# Patient Record
Sex: MALE | Race: BLACK OR AFRICAN AMERICAN | NOT HISPANIC OR LATINO | Employment: OTHER | RURAL
[De-identification: names, ages, dates, MRNs, and addresses within clinical notes are randomized per-mention and may not be internally consistent; named-entity substitution may affect disease eponyms.]

---

## 2017-03-01 ENCOUNTER — OFFICE VISIT (OUTPATIENT)
Dept: FAMILY MEDICINE CLINIC | Facility: CLINIC | Age: 67
End: 2017-03-01

## 2017-03-01 VITALS
BODY MASS INDEX: 30.19 KG/M2 | HEART RATE: 72 BPM | WEIGHT: 227.8 LBS | DIASTOLIC BLOOD PRESSURE: 89 MMHG | OXYGEN SATURATION: 98 % | SYSTOLIC BLOOD PRESSURE: 160 MMHG | TEMPERATURE: 97.8 F | HEIGHT: 73 IN

## 2017-03-01 DIAGNOSIS — I10 ESSENTIAL HYPERTENSION: ICD-10-CM

## 2017-03-01 DIAGNOSIS — E78.5 HYPERLIPIDEMIA, UNSPECIFIED HYPERLIPIDEMIA TYPE: Primary | ICD-10-CM

## 2017-03-01 DIAGNOSIS — F32.A DEPRESSION, UNSPECIFIED DEPRESSION TYPE: ICD-10-CM

## 2017-03-01 DIAGNOSIS — D50.9 IRON DEFICIENCY ANEMIA, UNSPECIFIED IRON DEFICIENCY ANEMIA TYPE: ICD-10-CM

## 2017-03-01 PROCEDURE — 99213 OFFICE O/P EST LOW 20 MIN: CPT | Performed by: FAMILY MEDICINE

## 2017-03-01 RX ORDER — POTASSIUM CHLORIDE 750 MG/1
CAPSULE, EXTENDED RELEASE ORAL
COMMUNITY
Start: 2016-11-30 | End: 2017-09-18 | Stop reason: DRUGHIGH

## 2017-03-01 RX ORDER — HYDROCHLOROTHIAZIDE 25 MG/1
25 TABLET ORAL DAILY
Qty: 30 TABLET | Refills: 3 | Status: SHIPPED | OUTPATIENT
Start: 2017-03-01 | End: 2017-07-24

## 2017-03-01 RX ORDER — POTASSIUM CHLORIDE 20 MEQ/1
TABLET, EXTENDED RELEASE ORAL
COMMUNITY
Start: 2016-12-05 | End: 2017-09-25 | Stop reason: DRUGHIGH

## 2017-03-01 RX ORDER — CITALOPRAM 20 MG/1
40 TABLET ORAL DAILY
Qty: 31 TABLET | Refills: 11 | Status: SHIPPED | OUTPATIENT
Start: 2017-03-01 | End: 2018-05-07

## 2017-03-01 NOTE — PATIENT INSTRUCTIONS
- take 40 mg of celexa once a day  - take micardis -hydrochlorothizide 80-25 mg by mouth daily  - will add new medication hydrochlorothiazide 25 mg by mouth daily.    - check blood pressure at home daily Goal <140/90  - get labwork  - recheck in 1 month

## 2017-03-01 NOTE — PROGRESS NOTES
Subjective   German Ray is a 66 y.o. male.     Problem List  1. Hypertension  2. Hyperlipidemia  3. Depression  4. Hypokalemia  5. Bereavement  6. Iron deficiency anemia  7. Vitamin D deficiency  8. H/O prostate cancer sp radiation therapy and seed therapy - sees Oncologist every 6 months.  9. Diverticulosis  10. Lower GI bleeding   11. Radiation proctitis  12. Hemorrhoids  13. Lipoma of upper back  14. BPH    Patient is 67 yo AAM with the above medical issues. Is here for followup and recheck.  Has seen Dr. Guerrero (General Surgery) for lipoma on back and pt chose not to have surgery at this time.  Currently lipoma on upper back is not causing any discomfort.  Pt states he has felt a little down lately. Wife passed away months ago and some friends passed away as well. Currently on celexa 20 mg PO q daily but pt took 40 mg at one time and back to 20.  Has not been consistently taking medication.  Also has iron deficiency anemia and is currently on iron supplement. Last hgb was 11.0.  Is due for new recheck today.  Blood pressure is on higher side today and does not check or record at home.  Today >140/90.  Currently on micardis-HCT 80-25 mg PO q daily and Ditilzem 260 mg PO q daily.   Also on pravastatin for hyperlipidemia. Is due for recheck on lipid panel.        Anemia   Presents for follow-up visit. There has been no abdominal pain, anorexia, bruising/bleeding easily, confusion, fever, leg swelling, light-headedness, malaise/fatigue, pallor, palpitations, paresthesias or pica. Signs of blood loss that are not present include hematemesis, hematochezia, melena, menorrhagia and vaginal bleeding. There is no history of chronic renal disease or heart failure. There are no compliance problems.  Compliance with medications is %.   Hypertension   This is a chronic problem. The current episode started more than 1 year ago. The problem is unchanged. The problem is uncontrolled. Pertinent negatives include no  anxiety, blurred vision, chest pain, headaches, malaise/fatigue, neck pain, orthopnea, palpitations, peripheral edema, PND, shortness of breath or sweats. There are no associated agents to hypertension. Risk factors for coronary artery disease include male gender, sedentary lifestyle and stress. Past treatments include calcium channel blockers, angiotensin blockers and diuretics. The current treatment provides no improvement. There are no compliance problems.  There is no history of angina, kidney disease, CAD/MI, CVA, heart failure, left ventricular hypertrophy, PVD or renovascular disease. There is no history of chronic renal disease, coarctation of the aorta, hyperaldosteronism, hypercortisolism, hyperparathyroidism, a hypertension causing med or pheochromocytoma.      The following portions of the patient's history were reviewed and updated as appropriate: allergies, current medications, past family history, past medical history, past social history, past surgical history and problem list.    Review of Systems   Constitutional: Negative.  Negative for fever and malaise/fatigue.   HENT: Negative.    Eyes: Negative.  Negative for blurred vision.   Respiratory: Negative.  Negative for shortness of breath.    Cardiovascular: Negative.  Negative for chest pain, palpitations, orthopnea and PND.   Gastrointestinal: Negative.  Negative for abdominal pain, anorexia, hematemesis, hematochezia and melena.   Endocrine: Negative.    Genitourinary: Negative.  Negative for menorrhagia and vaginal bleeding.   Musculoskeletal: Negative.  Negative for neck pain.   Skin: Negative.  Negative for pallor.   Allergic/Immunologic: Negative.    Neurological: Negative.  Negative for light-headedness, headaches and paresthesias.   Hematological: Negative.  Does not bruise/bleed easily.   Psychiatric/Behavioral: Positive for decreased concentration. Negative for confusion.       Objective    Visit Vitals   • /89 (BP Location: Left  "arm, Patient Position: Sitting)   • Pulse 72   • Temp 97.8 °F (36.6 °C) (Axillary)   • Ht 73\" (185.4 cm)   • Wt 227 lb 12.8 oz (103 kg)   • SpO2 98%   • BMI 30.05 kg/m2         Chemistry        Component Value Date/Time     08/31/2016 0933    K 3.6 08/31/2016 0933     08/31/2016 0933    CO2 33 (H) 08/31/2016 0933    BUN 12 08/31/2016 0933    CREATININE 1.0 08/31/2016 0933        Component Value Date/Time    CALCIUM 9.3 08/31/2016 0933    ALKPHOS 83 08/31/2016 0933    AST 21 08/31/2016 0933    ALT 18 (L) 08/31/2016 0933    BILITOT 0.6 08/31/2016 0933        Lab Results   Component Value Date    WBC 6.1 10/20/2016    HGB 11.5 (L) 10/20/2016    HCT 35.4 (L) 10/20/2016    MCV 90.1 10/20/2016     10/20/2016     No results found for: CHOL  Lab Results   Component Value Date    TRIG 71 08/31/2016    TRIG 122 05/20/2016    TRIG 148 11/04/2015     Lab Results   Component Value Date    HDL 61 08/31/2016    HDL 61 05/20/2016    HDL 53 (L) 11/04/2015     Lab Results   Component Value Date    LDLCALC 93 08/31/2016    LDLCALC 108 05/20/2016    LDLCALC 104 11/04/2015     No results found for: LDL  No results found for: HDLLDLRATIO  No components found for: CHOLHDL  Lab Results   Component Value Date    HGBA1C 5.6 08/31/2016     Lab Results   Component Value Date    TSH 3.11 05/20/2016     Physical Exam   Constitutional: He is oriented to person, place, and time. He appears well-developed and well-nourished. No distress.   HENT:   Head: Normocephalic and atraumatic.   Right Ear: External ear normal.   Eyes: Conjunctivae and EOM are normal. Pupils are equal, round, and reactive to light. Right eye exhibits no discharge. Left eye exhibits no discharge. No scleral icterus.   Neck: Normal range of motion. Neck supple. No JVD present. No tracheal deviation present. No thyromegaly present.   Cardiovascular: Normal rate, regular rhythm and normal heart sounds.    Pulmonary/Chest: Effort normal and breath sounds normal. " No stridor. No respiratory distress. He has no wheezes.   Abdominal: Soft. Bowel sounds are normal. He exhibits no distension and no mass. There is no tenderness. There is no rebound and no guarding. No hernia.   Musculoskeletal: Normal range of motion. He exhibits no edema, tenderness or deformity.   Lymphadenopathy:     He has no cervical adenopathy.   Neurological: He is alert and oriented to person, place, and time. He has normal reflexes. No cranial nerve deficit. Coordination normal.   Skin: Skin is warm and dry. No rash noted. He is not diaphoretic. No erythema. No pallor.   Psychiatric: He has a normal mood and affect. His behavior is normal. Judgment and thought content normal.   Nursing note and vitals reviewed.      Assessment/Plan   Problems Addressed this Visit        Cardiovascular and Mediastinum    Hyperlipidemia - Primary    Relevant Orders    Lipid Panel    Essential hypertension    Relevant Medications    hydrochlorothiazide (HYDRODIURIL) 25 MG tablet       Hematopoietic and Hemostatic    Anemia    Relevant Orders    CBC Auto Differential    Comprehensive Metabolic Panel       Other    Depression    Relevant Medications    citalopram (CELEXA) 20 MG tablet        - for depression - will go up on dosage of celexa from 20 to 40 mg PO q daily. Advised pt to continue at current dosage. Recommended pt go on a trip or train since he has been wanting to do that. Pt states he is planning to go in April  - for hypertension - pt to continue micardis - hctz 80 -25 mg PO q daily.  Pt to continue dilitazem 360 mg PO q daily. Will add HCTZ 25 mg PO q daily for a total dosage of 50 mg HCTZ daily.  Pt advised to bring BP logs on next visit  - regarding anemia - recheck CBC but may need to increase frequency of iron pill depending on results  -hyperlipidemia - continue statin. Recheck lipid panel  - recheck in 1 month

## 2017-03-02 LAB
ALBUMIN SERPL-MCNC: 4.1 G/DL (ref 3.4–4.8)
ALBUMIN/GLOB SERPL: 1.4 G/DL (ref 1.1–1.8)
ALP SERPL-CCNC: 70 U/L (ref 38–126)
ALT SERPL W P-5'-P-CCNC: 24 U/L (ref 21–72)
ANION GAP SERPL CALCULATED.3IONS-SCNC: 7 MMOL/L (ref 5–15)
ARTICHOKE IGE QN: 73 MG/DL (ref 1–129)
AST SERPL-CCNC: 16 U/L (ref 17–59)
BILIRUB SERPL-MCNC: 0.5 MG/DL (ref 0.2–1.3)
BUN BLD-MCNC: 14 MG/DL (ref 7–21)
BUN/CREAT SERPL: 12.8 (ref 7–25)
CALCIUM SPEC-SCNC: 9.3 MG/DL (ref 8.4–10.2)
CHLORIDE SERPL-SCNC: 102 MMOL/L (ref 95–110)
CHOLEST SERPL-MCNC: 182 MG/DL (ref 0–199)
CO2 SERPL-SCNC: 32 MMOL/L (ref 22–31)
CREAT BLD-MCNC: 1.09 MG/DL (ref 0.7–1.3)
GFR SERPL CREATININE-BSD FRML MDRD: 82 ML/MIN/1.73 (ref 49–113)
GLOBULIN UR ELPH-MCNC: 2.9 GM/DL (ref 2.3–3.5)
GLUCOSE BLD-MCNC: 98 MG/DL (ref 60–100)
HDLC SERPL-MCNC: 68 MG/DL (ref 60–200)
LDLC/HDLC SERPL: 1.42 {RATIO} (ref 0–3.55)
POTASSIUM BLD-SCNC: 3.5 MMOL/L (ref 3.5–5.1)
PROT SERPL-MCNC: 7 G/DL (ref 6.3–8.6)
SODIUM BLD-SCNC: 141 MMOL/L (ref 137–145)
TRIGL SERPL-MCNC: 87 MG/DL (ref 20–199)

## 2017-03-02 PROCEDURE — 85025 COMPLETE CBC W/AUTO DIFF WBC: CPT | Performed by: FAMILY MEDICINE

## 2017-03-02 PROCEDURE — 83550 IRON BINDING TEST: CPT | Performed by: FAMILY MEDICINE

## 2017-03-02 PROCEDURE — 80074 ACUTE HEPATITIS PANEL: CPT | Performed by: FAMILY MEDICINE

## 2017-03-02 PROCEDURE — 80053 COMPREHEN METABOLIC PANEL: CPT | Performed by: FAMILY MEDICINE

## 2017-03-02 PROCEDURE — 80061 LIPID PANEL: CPT | Performed by: FAMILY MEDICINE

## 2017-03-02 PROCEDURE — 83540 ASSAY OF IRON: CPT | Performed by: FAMILY MEDICINE

## 2017-03-03 RX ORDER — FERROUS SULFATE 325(65) MG
325 TABLET ORAL 2 TIMES DAILY WITH MEALS
Qty: 60 TABLET | Refills: 11 | Status: SHIPPED | OUTPATIENT
Start: 2017-03-03 | End: 2017-08-08

## 2017-03-31 ENCOUNTER — OFFICE VISIT (OUTPATIENT)
Dept: FAMILY MEDICINE CLINIC | Facility: CLINIC | Age: 67
End: 2017-03-31

## 2017-03-31 VITALS
HEIGHT: 73 IN | RESPIRATION RATE: 16 BRPM | WEIGHT: 226.6 LBS | DIASTOLIC BLOOD PRESSURE: 78 MMHG | HEART RATE: 79 BPM | TEMPERATURE: 97.9 F | BODY MASS INDEX: 30.03 KG/M2 | SYSTOLIC BLOOD PRESSURE: 134 MMHG

## 2017-03-31 DIAGNOSIS — Z23 NEED FOR VACCINATION: ICD-10-CM

## 2017-03-31 DIAGNOSIS — I10 ESSENTIAL HYPERTENSION: Primary | ICD-10-CM

## 2017-03-31 DIAGNOSIS — F32.A DEPRESSION, UNSPECIFIED DEPRESSION TYPE: ICD-10-CM

## 2017-03-31 DIAGNOSIS — N52.9 ERECTILE DYSFUNCTION, UNSPECIFIED ERECTILE DYSFUNCTION TYPE: ICD-10-CM

## 2017-03-31 PROCEDURE — 90732 PPSV23 VACC 2 YRS+ SUBQ/IM: CPT | Performed by: FAMILY MEDICINE

## 2017-03-31 PROCEDURE — 90715 TDAP VACCINE 7 YRS/> IM: CPT | Performed by: FAMILY MEDICINE

## 2017-03-31 PROCEDURE — G0009 ADMIN PNEUMOCOCCAL VACCINE: HCPCS | Performed by: FAMILY MEDICINE

## 2017-03-31 PROCEDURE — 90472 IMMUNIZATION ADMIN EACH ADD: CPT | Performed by: FAMILY MEDICINE

## 2017-03-31 PROCEDURE — 99213 OFFICE O/P EST LOW 20 MIN: CPT | Performed by: FAMILY MEDICINE

## 2017-03-31 RX ORDER — SILDENAFIL 25 MG/1
25 TABLET, FILM COATED ORAL AS NEEDED
Qty: 10 TABLET | Refills: 3 | Status: SHIPPED | OUTPATIENT
Start: 2017-03-31 | End: 2018-05-07

## 2017-03-31 NOTE — PROGRESS NOTES
Subjective   German Ray is a 66 y.o. male.     Problem List  1. Hypertension  2. Hyperlipidemia  3. Depression  4. Hypokalemia  5. Bereavement  6. Iron deficiency anemia  7. Vitamin D deficiency  8. H/O prostate cancer sp radiation therapy and seed therapy - sees Oncologist every 6 months.  9. Diverticulosis  10. Lower GI bleeding   11. Radiation proctitis  12. Hemorrhoids  13. Lipoma of upper back  14. BPH  15. Erectile Dysfunction    Pt is 67 yo AAM with the above medical issues who is here for recheck. On last visit pt had dosage of celexa adjusted from 20 to 40 mg daily for depression. In addition HCTZ 25 mg was added for hypertension.  States that depression has improved with celexa and blood pressure is better with addition HCTZ 25 mg to Micardis 10-25 mg daily and Diltiazem 360 mg PO q daily.   Pt is about to go on trip to [place] soon.       Pt is also requesting medication today for erectile dysfunction. Is currently in a relationship.   Has been using Viagra in past which has helped.      Hypertension   This is a chronic problem. The current episode started more than 1 year ago. The problem has been gradually improving since onset. The problem is controlled. Pertinent negatives include no anxiety, blurred vision, chest pain, headaches, malaise/fatigue, neck pain, orthopnea, palpitations, peripheral edema, PND, shortness of breath or sweats. Risk factors for coronary artery disease include male gender and sedentary lifestyle. Past treatments include angiotensin blockers, diuretics and calcium channel blockers. There are no compliance problems.  There is no history of angina, kidney disease, CAD/MI, CVA, heart failure, left ventricular hypertrophy, PVD, renovascular disease, retinopathy or a thyroid problem. There is no history of chronic renal disease, coarctation of the aorta, hyperaldosteronism, hyperparathyroidism, a hypertension causing med, pheochromocytoma or sleep apnea.   Depression   Visit  "Type: follow-up  Patient presents with the following symptoms: depressed mood.  Patient is not experiencing: anhedonia, chest pain, choking sensation, compulsions, confusion, decreased concentration, dizziness, dry mouth, excessive worry, fatigue, feelings of hopelessness, feelings of worthlessness, hypersomnia, hyperventilation, impotence, insomnia, irritability, malaise, memory impairment, muscle tension, nausea, nervousness/anxiety, obsessions, palpitations, panic, psychomotor agitation, psychomotor retardation, restlessness, shortness of breath, suicidal ideas, suicidal planning, thoughts of death, weight gain and weight loss.  Severity: mild   Sleep quality: good  Nighttime awakenings: occasional      The following portions of the patient's history were reviewed and updated as appropriate: allergies, current medications, past family history, past medical history, past social history, past surgical history and problem list.    Review of Systems   Constitutional: Negative.  Negative for irritability, malaise/fatigue, weight gain and weight loss.   HENT: Negative.    Eyes: Negative.  Negative for blurred vision.   Respiratory: Negative.  Negative for choking and shortness of breath.    Cardiovascular: Negative.  Negative for chest pain, palpitations, orthopnea and PND.   Gastrointestinal: Negative.    Endocrine: Negative.    Genitourinary: Negative.  Negative for impotence.   Musculoskeletal: Negative.  Negative for neck pain.   Skin: Negative.    Allergic/Immunologic: Negative.    Neurological: Negative.  Negative for headaches.   Hematological: Negative.    Psychiatric/Behavioral: Negative.  Negative for confusion, decreased concentration and suicidal ideas. The patient is not nervous/anxious and does not have insomnia.        Objective    /78  Pulse 79  Temp 97.9 °F (36.6 °C)  Resp 16  Ht 73\" (185.4 cm)  Wt 226 lb 9.6 oz (103 kg)  BMI 29.9 kg/m2    /78  Pulse 79  Temp 97.9 °F (36.6 °C)  " "Resp 16  Ht 73\" (185.4 cm)  Wt 226 lb 9.6 oz (103 kg)  BMI 29.9 kg/m2    Chemistry        Component Value Date/Time     03/02/2017 1037    K 3.5 03/02/2017 1037     03/02/2017 1037    CO2 32.0 (H) 03/02/2017 1037    BUN 14 03/02/2017 1037    CREATININE 1.09 03/02/2017 1037        Component Value Date/Time    CALCIUM 9.3 03/02/2017 1037    ALKPHOS 70 03/02/2017 1037    AST 16 (L) 03/02/2017 1037    ALT 24 03/02/2017 1037    BILITOT 0.5 03/02/2017 1037        Lab Results   Component Value Date    WBC 2.93 (L) 03/02/2017    HGB 12.3 (L) 03/02/2017    HCT 38.5 (L) 03/02/2017    MCV 87.9 03/02/2017     03/02/2017     Lab Results   Component Value Date    CHOL 182 03/02/2017     Lab Results   Component Value Date    TRIG 87 03/02/2017    TRIG 71 08/31/2016    TRIG 122 05/20/2016     Lab Results   Component Value Date    HDL 68 03/02/2017    HDL 61 08/31/2016    HDL 61 05/20/2016     Lab Results   Component Value Date    LDLCALC 93 08/31/2016    LDLCALC 108 05/20/2016    LDLCALC 104 11/04/2015     No results found for: LDL  No results found for: HDLLDLRATIO  No components found for: CHOLHDL  Lab Results   Component Value Date    HGBA1C 5.6 08/31/2016     Lab Results   Component Value Date    TSH 3.11 05/20/2016     Physical Exam   Constitutional: He is oriented to person, place, and time. He appears well-developed and well-nourished. No distress.   HENT:   Head: Normocephalic and atraumatic.   Right Ear: External ear normal.   Left Ear: External ear normal.   Eyes: Conjunctivae and EOM are normal. Pupils are equal, round, and reactive to light. Right eye exhibits no discharge. Left eye exhibits no discharge. No scleral icterus.   Neck: Normal range of motion. Neck supple. No JVD present. No tracheal deviation present. No thyromegaly present.   Cardiovascular: Normal rate, regular rhythm and normal heart sounds.    Pulmonary/Chest: Effort normal and breath sounds normal. No stridor. No respiratory " distress. He has no wheezes.   Abdominal: Soft. Bowel sounds are normal. He exhibits no distension and no mass. There is no tenderness. There is no rebound and no guarding. No hernia.   Musculoskeletal: Normal range of motion. He exhibits no edema, tenderness or deformity.   Lymphadenopathy:     He has no cervical adenopathy.   Neurological: He is alert and oriented to person, place, and time. He has normal reflexes. No cranial nerve deficit. Coordination normal.   Skin: Skin is warm and dry. No rash noted. He is not diaphoretic. No erythema. No pallor.   Psychiatric: He has a normal mood and affect. His behavior is normal. Thought content normal.   Nursing note and vitals reviewed.      Assessment/Plan   Problems Addressed this Visit        Cardiovascular and Mediastinum    Essential hypertension - Primary       Genitourinary    Erectile dysfunction       Other    Depression    Need for vaccination    Relevant Orders    Tdap Vaccine Greater Than or Equal To 6yo IM (Completed)    Pneumococcal Polysaccharide Vaccine 23-Valent Greater Than or Equal To 3yo Subcutaneous / IM (Completed)        - for essential hypertension - Pt to continue with Micardis- HCT, HCtZ 25 mg daily along with Diltiazem 360 mg daily.    - for depression - continue celexa 40 mg PO q daily  - for ED - will prescribe Viagra 25 mg PO PRN. Will give 10 pills. Advised pt to caution and watch BP while taking other BP medications.    - will give tetanus and pneumonia vaccination today  - recheck in 3 months

## 2017-03-31 NOTE — PATIENT INSTRUCTIONS
Sildenafil tablets (Viagra)  What is this medicine?  SILDENAFIL (art DEN a buffy) is used to treat erection problems in men.  This medicine may be used for other purposes; ask your health care provider or pharmacist if you have questions.  COMMON BRAND NAME(S): Viagra  What should I tell my health care provider before I take this medicine?  They need to know if you have any of these conditions:  -bleeding disorders  -eye or vision problems, including a rare inherited eye disease called retinitis pigmentosa  -anatomical deformation of the penis, Peyronie's disease, or history of priapism (painful and prolonged erection)  -heart disease, angina, a history of heart attack, irregular heart beats, or other heart problems  -high or low blood pressure  -history of blood diseases, like sickle cell anemia or leukemia  -history of stomach bleeding  -kidney disease  -liver disease  -stroke  -an unusual or allergic reaction to sildenafil, other medicines, foods, dyes, or preservatives  -pregnant or trying to get pregnant  -breast-feeding  How should I use this medicine?  Take this medicine by mouth with a glass of water. Follow the directions on the prescription label. The dose is usually taken 1 hour before sexual activity. You should not take the dose more than once per day. Do not take your medicine more often than directed.  Talk to your pediatrician regarding the use of this medicine in children. This medicine is not used in children for this condition.  Overdosage: If you think you have taken too much of this medicine contact a poison control center or emergency room at once.  NOTE: This medicine is only for you. Do not share this medicine with others.  What if I miss a dose?  This does not apply. Do not take double or extra doses.  What may interact with this medicine?  Do not take this medicine with any of the following medications:  -cisapride  -nitrates like amyl nitrite, isosorbide dinitrate, isosorbide mononitrate,  nitroglycerin  -riociguat  This medicine may also interact with the following medications:  -antiviral medicines for HIV or AIDS  -bosentan  -certain medicines for benign prostatic hyperplasia (BPH)  -certain medicines for blood pressure  -certain medicines for fungal infections like ketoconazole and itraconazole  -cimetidine  -erythromycin  -rifampin  This list may not describe all possible interactions. Give your health care provider a list of all the medicines, herbs, non-prescription drugs, or dietary supplements you use. Also tell them if you smoke, drink alcohol, or use illegal drugs. Some items may interact with your medicine.  What should I watch for while using this medicine?  If you notice any changes in your vision while taking this drug, call your doctor or health care professional as soon as possible. Stop using this medicine and call your health care provider right away if you have a loss of sight in one or both eyes.  Contact your doctor or health care professional right away if you have an erection that lasts longer than 4 hours or if it becomes painful. This may be a sign of a serious problem and must be treated right away to prevent permanent damage.  If you experience symptoms of nausea, dizziness, chest pain or arm pain upon initiation of sexual activity after taking this medicine, you should refrain from further activity and call your doctor or health care professional as soon as possible.  Do not drink alcohol to excess (examples, 5 glasses of wine or 5 shots of whiskey) when taking this medicine. When taken in excess, alcohol can increase your chances of getting a headache or getting dizzy, increasing your heart rate or lowering your blood pressure.  Using this medicine does not protect you or your partner against HIV infection (the virus that causes AIDS) or other sexually transmitted diseases.  What side effects may I notice from receiving this medicine?  Side effects that you should report  to your doctor or health care professional as soon as possible:  -allergic reactions like skin rash, itching or hives, swelling of the face, lips, or tongue  -breathing problems  -changes in hearing  -changes in vision  -chest pain  -fast, irregular heartbeat  -prolonged or painful erection  -seizures  Side effects that usually do not require medical attention (report to your doctor or health care professional if they continue or are bothersome):  -back pain  -dizziness  -flushing  -headache  -indigestion  -muscle aches  -nausea  -stuffy or runny nose  This list may not describe all possible side effects. Call your doctor for medical advice about side effects. You may report side effects to FDA at 0-020-FDA-8043.  Where should I keep my medicine?  Keep out of reach of children.  Store at room temperature between 15 and 30 degrees C (59 and 86 degrees F). Throw away any unused medicine after the expiration date.  NOTE: This sheet is a summary. It may not cover all possible information. If you have questions about this medicine, talk to your doctor, pharmacist, or health care provider.     © 2017, [employer]. (2016-11-30 12:00:25)

## 2017-07-24 ENCOUNTER — OFFICE VISIT (OUTPATIENT)
Dept: FAMILY MEDICINE CLINIC | Facility: CLINIC | Age: 67
End: 2017-07-24

## 2017-07-24 VITALS
WEIGHT: 237.8 LBS | TEMPERATURE: 97.9 F | RESPIRATION RATE: 16 BRPM | HEIGHT: 73 IN | SYSTOLIC BLOOD PRESSURE: 142 MMHG | HEART RATE: 66 BPM | DIASTOLIC BLOOD PRESSURE: 78 MMHG | BODY MASS INDEX: 31.51 KG/M2

## 2017-07-24 DIAGNOSIS — E78.5 HYPERLIPIDEMIA, UNSPECIFIED HYPERLIPIDEMIA TYPE: ICD-10-CM

## 2017-07-24 DIAGNOSIS — D64.9 ANEMIA, UNSPECIFIED TYPE: Primary | ICD-10-CM

## 2017-07-24 DIAGNOSIS — I10 ESSENTIAL HYPERTENSION: ICD-10-CM

## 2017-07-24 DIAGNOSIS — E55.9 VITAMIN D DEFICIENCY: ICD-10-CM

## 2017-07-24 DIAGNOSIS — F32.A DEPRESSION, UNSPECIFIED DEPRESSION TYPE: ICD-10-CM

## 2017-07-24 PROCEDURE — 82728 ASSAY OF FERRITIN: CPT | Performed by: FAMILY MEDICINE

## 2017-07-24 PROCEDURE — 83021 HEMOGLOBIN CHROMOTOGRAPHY: CPT | Performed by: FAMILY MEDICINE

## 2017-07-24 PROCEDURE — 99214 OFFICE O/P EST MOD 30 MIN: CPT | Performed by: FAMILY MEDICINE

## 2017-07-24 PROCEDURE — 82607 VITAMIN B-12: CPT | Performed by: FAMILY MEDICINE

## 2017-07-24 PROCEDURE — 36415 COLL VENOUS BLD VENIPUNCTURE: CPT | Performed by: FAMILY MEDICINE

## 2017-07-24 PROCEDURE — 83540 ASSAY OF IRON: CPT | Performed by: FAMILY MEDICINE

## 2017-07-24 PROCEDURE — 85660 RBC SICKLE CELL TEST: CPT | Performed by: FAMILY MEDICINE

## 2017-07-24 PROCEDURE — 83550 IRON BINDING TEST: CPT | Performed by: FAMILY MEDICINE

## 2017-07-24 PROCEDURE — 85046 RETICYTE/HGB CONCENTRATE: CPT | Performed by: FAMILY MEDICINE

## 2017-07-24 PROCEDURE — 82746 ASSAY OF FOLIC ACID SERUM: CPT | Performed by: FAMILY MEDICINE

## 2017-07-24 PROCEDURE — 85025 COMPLETE CBC W/AUTO DIFF WBC: CPT | Performed by: FAMILY MEDICINE

## 2017-07-24 RX ORDER — DOXAZOSIN 8 MG/1
8 TABLET ORAL NIGHTLY
Qty: 30 TABLET | Refills: 11 | Status: SHIPPED | OUTPATIENT
Start: 2017-07-24 | End: 2018-07-27 | Stop reason: SDUPTHER

## 2017-07-24 RX ORDER — PRAVASTATIN SODIUM 40 MG
40 TABLET ORAL NIGHTLY
Qty: 30 TABLET | Refills: 11 | Status: SHIPPED | OUTPATIENT
Start: 2017-07-24 | End: 2017-11-28 | Stop reason: SDUPTHER

## 2017-07-24 RX ORDER — TELMISARTAN 80 MG/1
80 TABLET ORAL DAILY
Qty: 30 TABLET | Refills: 11 | Status: SHIPPED | OUTPATIENT
Start: 2017-07-24 | End: 2018-07-27 | Stop reason: SDUPTHER

## 2017-07-24 RX ORDER — HYDROCHLOROTHIAZIDE 50 MG/1
50 TABLET ORAL DAILY
Qty: 30 TABLET | Refills: 11 | Status: SHIPPED | OUTPATIENT
Start: 2017-07-24 | End: 2018-07-27 | Stop reason: SDUPTHER

## 2017-07-24 RX ORDER — DILTIAZEM HYDROCHLORIDE 360 MG/1
360 CAPSULE, EXTENDED RELEASE ORAL DAILY
Qty: 30 CAPSULE | Refills: 11 | Status: SHIPPED | OUTPATIENT
Start: 2017-07-24 | End: 2018-07-27 | Stop reason: SDUPTHER

## 2017-07-24 NOTE — PROGRESS NOTES
"Subjective   German Ray is a 66 y.o. male.     History of Present Illness     Problem List  1. Hypertension  2. Hyperlipidemia  3. Depression  4. Hypokalemia  5. Bereavement  6. Iron deficiency anemia  7. Vitamin D deficiency  8. H/O prostate cancer sp radiation therapy and seed therapy - sees Oncologist every 6 months.  9. Diverticulosis  10. H/O Lower GI bleeding   11. Radiation proctitis  12. Hemorrhoids  13. Lipoma of upper back  14. BPH  15. Erectile Dysfunction    Pt is is 65 yo AAM with the above medical issues. Is here for recheck. Pt has history of anemia, is due for recheck.  Also has history of hypertension and is on Cardura and HCTZ 25 and micardidis-HCTZ mg PO q daily.  For hyperlipidemia pt is taking pravastatin. For depression pt is taking celexa. Stable today.  BP slightly  Elevated today but at home runs <140/90. Denies any chest pain, headaches or dizziness. Last lipid panel was normal . States he is in good spirits.  He is better emotionally since his wife passed away last year     The following portions of the patient's history were reviewed and updated as appropriate: allergies, current medications, past family history, past medical history, past social history, past surgical history and problem list.    Review of Systems   Constitutional: Negative.    HENT: Negative.    Eyes: Negative.    Respiratory: Negative.    Cardiovascular: Negative.    Gastrointestinal: Negative.    Endocrine: Negative.    Genitourinary: Negative.    Musculoskeletal: Negative.    Skin: Negative.    Allergic/Immunologic: Negative.    Neurological: Negative.    Hematological: Negative.    Psychiatric/Behavioral: Negative.        Objective    /78  Pulse 66  Temp 97.9 °F (36.6 °C)  Resp 16  Ht 73\" (185.4 cm)  Wt 237 lb 12.8 oz (108 kg)  BMI 31.37 kg/m2      Chemistry        Component Value Date/Time     03/02/2017 1037    K 3.5 03/02/2017 1037     03/02/2017 1037    CO2 32.0 (H) 03/02/2017 1037 "    BUN 14 03/02/2017 1037    CREATININE 1.09 03/02/2017 1037        Component Value Date/Time    CALCIUM 9.3 03/02/2017 1037    ALKPHOS 70 03/02/2017 1037    AST 16 (L) 03/02/2017 1037    ALT 24 03/02/2017 1037    BILITOT 0.5 03/02/2017 1037        Lab Results   Component Value Date    WBC 2.93 (L) 03/02/2017    HGB 12.3 (L) 03/02/2017    HCT 38.5 (L) 03/02/2017    MCV 87.9 03/02/2017     03/02/2017     .lastipid  Lab Results   Component Value Date    HGBA1C 5.6 08/31/2016     Lab Results   Component Value Date    TSH 3.11 05/20/2016     Office Visit on 03/01/2017   Component Date Value Ref Range Status   • Glucose 03/02/2017 98  60 - 100 mg/dL Final   • BUN 03/02/2017 14  7 - 21 mg/dL Final   • Creatinine 03/02/2017 1.09  0.70 - 1.30 mg/dL Final   • Sodium 03/02/2017 141  137 - 145 mmol/L Final   • Potassium 03/02/2017 3.5  3.5 - 5.1 mmol/L Final   • Chloride 03/02/2017 102  95 - 110 mmol/L Final   • CO2 03/02/2017 32.0* 22.0 - 31.0 mmol/L Final   • Calcium 03/02/2017 9.3  8.4 - 10.2 mg/dL Final   • Total Protein 03/02/2017 7.0  6.3 - 8.6 g/dL Final   • Albumin 03/02/2017 4.10  3.40 - 4.80 g/dL Final   • ALT (SGPT) 03/02/2017 24  21 - 72 U/L Final   • AST (SGOT) 03/02/2017 16* 17 - 59 U/L Final   • Alkaline Phosphatase 03/02/2017 70  38 - 126 U/L Final   • Total Bilirubin 03/02/2017 0.5  0.2 - 1.3 mg/dL Final   • eGFR   Amer 03/02/2017 82  49 - 113 mL/min/1.73 Final   • Globulin 03/02/2017 2.9  2.3 - 3.5 gm/dL Final   • A/G Ratio 03/02/2017 1.4  1.1 - 1.8 g/dL Final   • BUN/Creatinine Ratio 03/02/2017 12.8  7.0 - 25.0 Final   • Anion Gap 03/02/2017 7.0  5.0 - 15.0 mmol/L Final   • Total Cholesterol 03/02/2017 182  0 - 199 mg/dL Final   • Triglycerides 03/02/2017 87  20 - 199 mg/dL Final   • HDL Cholesterol 03/02/2017 68  60 - 200 mg/dL Final   • LDL Cholesterol  03/02/2017 73  1 - 129 mg/dL Final   • LDL/HDL Ratio 03/02/2017 1.42  0.00 - 3.55 Final       Physical Exam   Constitutional: He is oriented  to person, place, and time. He appears well-developed and well-nourished. No distress.   HENT:   Head: Normocephalic and atraumatic.   Right Ear: External ear normal.   Left Ear: External ear normal.   Eyes: Conjunctivae and EOM are normal. Pupils are equal, round, and reactive to light. Right eye exhibits no discharge. Left eye exhibits no discharge. No scleral icterus.   Neck: Normal range of motion. Neck supple. No JVD present. No tracheal deviation present. No thyromegaly present.   Cardiovascular: Normal rate, regular rhythm and normal heart sounds.    Pulmonary/Chest: Effort normal. No stridor.   Abdominal: Bowel sounds are normal. He exhibits no distension and no mass. There is no tenderness. There is no rebound and no guarding. No hernia.   Musculoskeletal: Normal range of motion. He exhibits no edema, tenderness or deformity.   Lymphadenopathy:     He has no cervical adenopathy.   Neurological: He is alert and oriented to person, place, and time. He has normal reflexes. No cranial nerve deficit. Coordination normal.   Skin: Skin is warm and dry. No rash noted. He is not diaphoretic. No erythema. No pallor.   Psychiatric: He has a normal mood and affect. His behavior is normal.   Nursing note and vitals reviewed.      Assessment/Plan   Problems Addressed this Visit        Cardiovascular and Mediastinum    Hyperlipidemia    Relevant Medications    pravastatin (PRAVACHOL) 40 MG tablet    Essential hypertension    Relevant Medications    hydrochlorothiazide (HYDRODIURIL) 50 MG tablet    diltiaZEM (TIAZAC) 360 MG 24 hr capsule    telmisartan (MICARDIS) 80 MG tablet    doxazosin (CARDURA) 8 MG tablet       Digestive    Vitamin D deficiency        Hematopoietic and Hemostatic    Anemia - Primary    Relevant Orders    CBC Auto Differential    Iron Profile    Ferritin    Folate    Vitamin B12    Reticulocytes    Hgb. Frac. Profile       Other    Depression      - Vitamin D deficiency - continue vitamin D pill  -  Essential hypertension - increased HCTZ from 25 to 50 mg PO q daily.  Continue micardis, cardura  - hyperlipidemia - refilled pravastatin   -Anemia - recheck CBC. Do full anemia workup  - recheck in 1 month  - Will schedule Medicare Wellness Examination   ADDENDUM (8/9/17). Pt has iron deficiency anemia.  Peripheral smear showed microcytic anemia.  Advised pt to go up on ferrous sulfate to 325 mg PO TID.  Gave new prescription.  Also pt has hemoccult positive stool. Last colonoscopy was last year. Will obtain Records from last colonoscopy. Also advised pt to go see Gastroenterology Dr. Oliveira Regarding blood in stool and possible repeat colonoscopy.  Pt aware of this and states he will call for an appointment.

## 2017-07-25 LAB
BASOPHILS # BLD AUTO: 0.02 10*3/MM3 (ref 0–0.2)
BASOPHILS NFR BLD AUTO: 0.6 % (ref 0–2)
DEPRECATED RDW RBC AUTO: 48.6 FL (ref 35.1–43.9)
EOSINOPHIL # BLD AUTO: 0.09 10*3/MM3 (ref 0–0.7)
EOSINOPHIL NFR BLD AUTO: 2.5 % (ref 0–7)
ERYTHROCYTE [DISTWIDTH] IN BLOOD BY AUTOMATED COUNT: 14.4 % (ref 11.5–14.5)
FERRITIN SERPL-MCNC: 20.3 NG/ML (ref 17.9–464)
FOLATE SERPL-MCNC: 12 NG/ML (ref 2.76–21)
HCT VFR BLD AUTO: 37 % (ref 39–49)
HCT VFR BLD AUTO: 37 % (ref 39–49)
HGB BLD-MCNC: 11.9 G/DL (ref 13.7–17.3)
HGB RETIC QN: 34.8 PG (ref 30–38)
IMM GRANULOCYTES # BLD: 0.01 10*3/MM3 (ref 0–0.02)
IMM GRANULOCYTES NFR BLD: 0.3 % (ref 0–0.5)
IMM RETICS NFR: 16.9 % (ref 3–15.9)
IRON 24H UR-MRATE: 29 MCG/DL (ref 49–181)
IRON SATN MFR SERPL: 8 % (ref 20–55)
LYMPHOCYTES # BLD AUTO: 0.73 10*3/MM3 (ref 0.6–4.2)
LYMPHOCYTES NFR BLD AUTO: 20.2 % (ref 10–50)
MCH RBC QN AUTO: 29.8 PG (ref 26.5–34)
MCHC RBC AUTO-ENTMCNC: 32.2 G/DL (ref 31.5–36.3)
MCV RBC AUTO: 92.5 FL (ref 80–98)
MONOCYTES # BLD AUTO: 0.44 10*3/MM3 (ref 0–0.9)
MONOCYTES NFR BLD AUTO: 12.2 % (ref 0–12)
NEUTROPHILS # BLD AUTO: 2.33 10*3/MM3 (ref 2–8.6)
NEUTROPHILS NFR BLD AUTO: 64.2 % (ref 37–80)
PLATELET # BLD AUTO: 264 10*3/MM3 (ref 150–450)
PMV BLD AUTO: 9.7 FL (ref 8–12)
RBC # BLD AUTO: 4 10*6/MM3 (ref 4.37–5.74)
RETICS #: 0.05 10*6/MM3 (ref 0.03–0.12)
RETICS/RBC NFR AUTO: 1.37 % (ref 0.64–2.26)
RETICULOCYTE PRODUCTION INDEX: 0.8 % (ref 0.64–2.26)
TIBC SERPL-MCNC: 342 MCG/DL (ref 261–462)
VIT B12 BLD-MCNC: 456 PG/ML (ref 239–931)
WBC NRBC COR # BLD: 3.62 10*3/MM3 (ref 3.2–9.8)

## 2017-07-26 LAB
HGB A MFR BLD: 97.8 % (ref 94–98)
HGB A2 MFR BLD COLUMN CHROM: 2.2 % (ref 0.7–3.1)
HGB C MFR BLD: 0 %
HGB F MFR BLD: 0 % (ref 0–2)
HGB FRACT BLD-IMP: NORMAL
HGB S BLD QL SOLY: NEGATIVE
HGB S MFR BLD: 0 %

## 2017-07-29 DIAGNOSIS — D64.9 ANEMIA, UNSPECIFIED TYPE: Primary | ICD-10-CM

## 2017-08-03 PROCEDURE — 83615 LACTATE (LD) (LDH) ENZYME: CPT | Performed by: FAMILY MEDICINE

## 2017-08-03 PROCEDURE — 82248 BILIRUBIN DIRECT: CPT | Performed by: FAMILY MEDICINE

## 2017-08-03 PROCEDURE — 36415 COLL VENOUS BLD VENIPUNCTURE: CPT | Performed by: FAMILY MEDICINE

## 2017-08-03 PROCEDURE — 82247 BILIRUBIN TOTAL: CPT | Performed by: FAMILY MEDICINE

## 2017-08-03 PROCEDURE — 85060 BLOOD SMEAR INTERPRETATION: CPT | Performed by: FAMILY MEDICINE

## 2017-08-03 PROCEDURE — 83010 ASSAY OF HAPTOGLOBIN QUANT: CPT | Performed by: FAMILY MEDICINE

## 2017-08-04 LAB
BILIRUB CONJ SERPL-MCNC: 0 MG/DL (ref 0–0.3)
BILIRUB INDIRECT SERPL-MCNC: 0.2 MG/DL (ref 0–1.1)
BILIRUB SERPL-MCNC: 0.2 MG/DL (ref 0.2–1.3)
HEMOCCULT STL QL: POSITIVE
LDH SERPL-CCNC: 369 U/L (ref 313–618)

## 2017-08-04 PROCEDURE — 82272 OCCULT BLD FECES 1-3 TESTS: CPT | Performed by: FAMILY MEDICINE

## 2017-08-05 LAB — HAPTOGLOB SERPL-MCNC: 156 MG/DL (ref 34–200)

## 2017-08-08 RX ORDER — LANOLIN ALCOHOL/MO/W.PET/CERES
325 CREAM (GRAM) TOPICAL
Qty: 90 TABLET | Refills: 11 | Status: SHIPPED | OUTPATIENT
Start: 2017-08-08 | End: 2018-07-27 | Stop reason: SDUPTHER

## 2017-09-18 ENCOUNTER — OFFICE VISIT (OUTPATIENT)
Dept: FAMILY MEDICINE CLINIC | Facility: CLINIC | Age: 67
End: 2017-09-18

## 2017-09-18 VITALS
OXYGEN SATURATION: 98 % | SYSTOLIC BLOOD PRESSURE: 140 MMHG | WEIGHT: 239.4 LBS | BODY MASS INDEX: 31.73 KG/M2 | DIASTOLIC BLOOD PRESSURE: 82 MMHG | TEMPERATURE: 98 F | HEIGHT: 73 IN | HEART RATE: 61 BPM

## 2017-09-18 DIAGNOSIS — Z00.00 MEDICARE ANNUAL WELLNESS VISIT, INITIAL: Primary | ICD-10-CM

## 2017-09-18 PROCEDURE — G0438 PPPS, INITIAL VISIT: HCPCS | Performed by: FAMILY MEDICINE

## 2017-09-18 NOTE — PATIENT INSTRUCTIONS
"    Get influenza vaccination - get at pharmacy   DASH Eating Plan  DASH stands for \"Dietary Approaches to Stop Hypertension.\" The DASH eating plan is a healthy eating plan that has been shown to reduce high blood pressure (hypertension). Additional health benefits may include reducing the risk of type 2 diabetes mellitus, heart disease, and stroke. The DASH eating plan may also help with weight loss.  WHAT DO I NEED TO KNOW ABOUT THE DASH EATING PLAN?  For the DASH eating plan, you will follow these general guidelines:  · Choose foods with less than 150 milligrams of sodium per serving (as listed on the food label).  · Use salt-free seasonings or herbs instead of table salt or sea salt.  · Check with your health care provider or pharmacist before using salt substitutes.  · Eat lower-sodium products. These are often labeled as \"low-sodium\" or \"no salt added.\"  · Eat fresh foods. Avoid eating a lot of canned foods.  · Eat more vegetables, fruits, and low-fat dairy products.  · Choose whole grains. Look for the word \"whole\" as the first word in the ingredient list.  · Choose fish and skinless chicken or turkey more often than red meat. Limit fish, poultry, and meat to 6 oz (170 g) each day.  · Limit sweets, desserts, sugars, and sugary drinks.  · Choose heart-healthy fats.  · Eat more home-cooked food and less restaurant, buffet, and fast food.  · Limit fried foods.  · Do not borges foods. Cook foods using methods such as baking, boiling, grilling, and broiling instead.  · When eating at a restaurant, ask that your food be prepared with less salt, or no salt if possible.  WHAT FOODS CAN I EAT?  Seek help from a dietitian for individual calorie needs.  Grains  Whole grain or whole wheat bread. Brown rice. Whole grain or whole wheat pasta. Quinoa, bulgur, and whole grain cereals. Low-sodium cereals. Corn or whole wheat flour tortillas. Whole grain cornbread. Whole grain crackers. Low-sodium crackers.  Vegetables  Fresh or " frozen vegetables (raw, steamed, roasted, or grilled). Low-sodium or reduced-sodium tomato and vegetable juices. Low-sodium or reduced-sodium tomato sauce and paste. Low-sodium or reduced-sodium canned vegetables.   Fruits  All fresh, canned (in natural juice), or frozen fruits.  Meat and Other Protein Products  Ground beef (85% or leaner), grass-fed beef, or beef trimmed of fat. Skinless chicken or turkey. Ground chicken or turkey. Pork trimmed of fat. All fish and seafood. Eggs. Dried beans, peas, or lentils. Unsalted nuts and seeds. Unsalted canned beans.  Dairy  Low-fat dairy products, such as skim or 1% milk, 2% or reduced-fat cheeses, low-fat ricotta or cottage cheese, or plain low-fat yogurt. Low-sodium or reduced-sodium cheeses.  Fats and Oils  Tub margarines without trans fats. Light or reduced-fat mayonnaise and salad dressings (reduced sodium). Avocado. Safflower, olive, or canola oils. Natural peanut or almond butter.  Other  Unsalted popcorn and pretzels.  The items listed above may not be a complete list of recommended foods or beverages. Contact your dietitian for more options.  WHAT FOODS ARE NOT RECOMMENDED?  Grains  White bread. White pasta. White rice. Refined cornbread. Bagels and croissants. Crackers that contain trans fat.  Vegetables  Creamed or fried vegetables. Vegetables in a cheese sauce. Regular canned vegetables. Regular canned tomato sauce and paste. Regular tomato and vegetable juices.  Fruits  Canned fruit in light or heavy syrup. Fruit juice.  Meat and Other Protein Products  Fatty cuts of meat. Ribs, chicken wings, kenney, sausage, bologna, salami, chitterlings, fatback, hot dogs, bratwurst, and packaged luncheon meats. Salted nuts and seeds. Canned beans with salt.  Dairy  Whole or 2% milk, cream, half-and-half, and cream cheese. Whole-fat or sweetened yogurt. Full-fat cheeses or blue cheese. Nondairy creamers and whipped toppings. Processed cheese, cheese spreads, or cheese  curds.  Condiments  Onion and garlic salt, seasoned salt, table salt, and sea salt. Canned and packaged gravies. Worcestershire sauce. Tartar sauce. Barbecue sauce. Teriyaki sauce. Soy sauce, including reduced sodium. Steak sauce. Fish sauce. Oyster sauce. Cocktail sauce. Horseradish. Ketchup and mustard. Meat flavorings and tenderizers. Bouillon cubes. Hot sauce. Tabasco sauce. Marinades. Taco seasonings. Relishes.  Fats and Oils  Butter, stick margarine, lard, shortening, ghee, and kenney fat. Coconut, palm kernel, or palm oils. Regular salad dressings.  Other  Pickles and olives. Salted popcorn and pretzels.  The items listed above may not be a complete list of foods and beverages to avoid. Contact your dietitian for more information.  WHERE CAN I FIND MORE INFORMATION?  National [employer], [employer], [employer] [place]: www.nhlbi.nih.gov/health/health-topics/topics/dash/     This information is not intended to replace advice given to you by your health care provider. Make sure you discuss any questions you have with your health care provider.     Document Released: 12/06/2012 Document Revised: 04/10/2017 Document Reviewed: 10/22/2014  [employer] Interactive Patient Education ©2017 [employer]

## 2017-09-18 NOTE — PROGRESS NOTES
QUICK REFERENCE INFORMATION:  The ABCs of the Annual Wellness Visit    Initial Medicare Wellness Visit    HEALTH RISK ASSESSMENT    1950    Recent Hospitalizations:  No hospitalization(s) within the last year..        Current Medical Providers:  Patient Care Team:  Abel Hoffman MD as PCP - General  Abel Hoffman MD as PCP - Claims Attributed        Smoking Status:  History   Smoking Status   • Never Smoker   Smokeless Tobacco   • Never Used       Alcohol Consumption:  History   Alcohol Use   • Yes     Comment: occasionally       Depression Screen:   PHQ-2/PHQ-9 Depression Screening 9/18/2017   Little interest or pleasure in doing things 0   Feeling down, depressed, or hopeless 1   Total Score 1       Health Habits and Functional and Cognitive Screening:  Functional & Cognitive Status 9/18/2017   Do you have difficulty preparing food and eating? No   Do you have difficulty bathing yourself? No   Do you have difficulty getting dressed? No   Do you have difficulty using the toilet? No   Do you have difficulty moving around from place to place? No   In the past year have you fallen or experienced a near fall? No   Do you need help using the phone?  No   Are you deaf or do you have serious difficulty hearing?  No   Do you need help with transportation? No   Do you need help shopping? No   Do you need help preparing meals?  No   Do you need help with housework?  No   Do you need help with laundry? No   Do you need help taking your medications? No   Do you need help managing money? No       Health Habits  Current Diet: Well Balanced Diet  Dental Exam: Not up to date  Eye Exam: Up to date  Exercise (times per week): 3 times per week  Current Exercise Activities Include: Walking          Does the patient have evidence of cognitive impairment? No    Asiprin use counseling: Contraindicated from taking ASA      Recent Lab Results:    Visual Acuity:  No exam data present    Age-appropriate Screening Schedule:  Refer to  the list below for future screening recommendations based on patient's age, sex and/or medical conditions. Orders for these recommended tests are listed in the plan section. The patient has been provided with a written plan.    Health Maintenance   Topic Date Due   • INFLUENZA VACCINE  08/01/2017   • LIPID PANEL  03/02/2018   • PNEUMOCOCCAL VACCINES (65+ LOW/MEDIUM RISK) (2 of 2 - PCV13) 03/31/2018   • COLONOSCOPY  03/15/2026   • TDAP/TD VACCINES (2 - Td) 03/31/2027   • ZOSTER VACCINE  Completed        Subjective   History of Present Illness    German Ray is a 66 y.o. male who presents for an Annual Wellness Visit.    The following portions of the patient's history were reviewed and updated as appropriate: allergies, current medications, past family history, past medical history, past social history, past surgical history and problem list.    Outpatient Medications Prior to Visit   Medication Sig Dispense Refill   • Calcium Carb-Cholecalciferol (OSTEO-PORETICAL) 600-1000 MG-UNIT tablet Take 1 tablet by mouth daily.     • citalopram (CELEXA) 20 MG tablet Take 2 tablets by mouth Daily. 31 tablet 11   • diltiaZEM (TIAZAC) 360 MG 24 hr capsule Take 1 capsule by mouth Daily. 30 capsule 11   • doxazosin (CARDURA) 8 MG tablet Take 1 tablet by mouth Every Night. 30 tablet 11   • ferrous sulfate 325 (65 FE) MG EC tablet Take 1 tablet by mouth 3 (Three) Times a Day With Meals. 90 tablet 11   • hydrochlorothiazide (HYDRODIURIL) 50 MG tablet Take 1 tablet by mouth Daily. 30 tablet 11   • potassium chloride (K-DUR) 10 MEQ CR tablet Take 1 tablet by mouth 2 (Two) Times a Day. 60 tablet 11   • pravastatin (PRAVACHOL) 40 MG tablet Take 1 tablet by mouth Every Night. 30 tablet 11   • sildenafil (VIAGRA) 25 MG tablet Take 1 tablet by mouth As Needed for erectile dysfunction. 10 tablet 3   • telmisartan (MICARDIS) 80 MG tablet Take 1 tablet by mouth Daily. 30 tablet 11   • potassium chloride (MICRO-K) 10 MEQ CR capsule      •  "potassium chloride (K-DUR,KLOR-CON) 20 MEQ CR tablet      • cetirizine (ZyrTEC) 10 MG tablet Take 1 tablet by mouth Daily. 30 tablet 11     No facility-administered medications prior to visit.        Patient Active Problem List   Diagnosis   • Hyperlipidemia   • Impaired fasting glucose    • Vitamin D deficiency    • Depression   • Bereavement   • Anemia   • Iron deficiency anemia   • Need for hepatitis C screening test   • Palpable mass of neck   • Encounter for immunization   • Radiation proctitis   • Diverticulosis of large intestine without hemorrhage   • Lower GI bleeding   • Pruritic rash   • Lipoma of back   • Essential hypertension   • Erectile dysfunction   • Need for vaccination       Advance Care Planning:  has NO advance directive - information provided to the patient today    Identification of Risk Factors:  Risk factors include: polypharmacy.    Review of Systems    Compared to one year ago, the patient feels his physical health is the same.  Compared to one year ago, the patient feels his mental health is better.    Objective     Physical Exam   Cardiovascular: Normal rate and regular rhythm.  Exam reveals no friction rub.    Murmur heard.  Pulmonary/Chest: Effort normal. No respiratory distress. He has no wheezes.       Vitals:    09/18/17 1330   BP: 140/82   BP Location: Right arm   Patient Position: Sitting   Cuff Size: Adult   Pulse: 61   Temp: 98 °F (36.7 °C)   TempSrc: Oral   SpO2: 98%   Weight: 239 lb 6.4 oz (109 kg)   Height: 73\" (185.4 cm)   PainSc: 0-No pain       Body mass index is 31.59 kg/(m^2).  Discussed the patient's BMI with him. The BMI is above average; BMI management plan is completed.    Assessment/Plan   Patient Self-Management and Personalized Health Advice  The patient has been provided with information about: diet and preventive services including:   · Advance directive.    Visit Diagnoses:  No diagnosis found.    No orders of the defined types were placed in this " encounter.      Outpatient Encounter Prescriptions as of 9/18/2017   Medication Sig Dispense Refill   • Calcium Carb-Cholecalciferol (OSTEO-PORETICAL) 600-1000 MG-UNIT tablet Take 1 tablet by mouth daily.     • citalopram (CELEXA) 20 MG tablet Take 2 tablets by mouth Daily. 31 tablet 11   • diltiaZEM (TIAZAC) 360 MG 24 hr capsule Take 1 capsule by mouth Daily. 30 capsule 11   • doxazosin (CARDURA) 8 MG tablet Take 1 tablet by mouth Every Night. 30 tablet 11   • ferrous sulfate 325 (65 FE) MG EC tablet Take 1 tablet by mouth 3 (Three) Times a Day With Meals. 90 tablet 11   • hydrochlorothiazide (HYDRODIURIL) 50 MG tablet Take 1 tablet by mouth Daily. 30 tablet 11   • potassium chloride (K-DUR) 10 MEQ CR tablet Take 1 tablet by mouth 2 (Two) Times a Day. 60 tablet 11   • pravastatin (PRAVACHOL) 40 MG tablet Take 1 tablet by mouth Every Night. 30 tablet 11   • sildenafil (VIAGRA) 25 MG tablet Take 1 tablet by mouth As Needed for erectile dysfunction. 10 tablet 3   • telmisartan (MICARDIS) 80 MG tablet Take 1 tablet by mouth Daily. 30 tablet 11   • [DISCONTINUED] potassium chloride (MICRO-K) 10 MEQ CR capsule      • potassium chloride (K-DUR,KLOR-CON) 20 MEQ CR tablet      • [DISCONTINUED] cetirizine (ZyrTEC) 10 MG tablet Take 1 tablet by mouth Daily. 30 tablet 11     No facility-administered encounter medications on file as of 9/18/2017.        Reviewed use of high risk medication in the elderly: yes  Reviewed for potential of harmful drug interactions in the elderly: yes    Follow Up:  No Follow-up on file.       Action Plan Discussed. Please see Scanned Document     An After Visit Summary and PPPS with all of these plans were given to the patient.         This document has been electronically signed by Abel Hoffman MD on September 18, 2017 2:01 PM

## 2017-09-25 ENCOUNTER — OFFICE VISIT (OUTPATIENT)
Dept: GASTROENTEROLOGY | Facility: CLINIC | Age: 67
End: 2017-09-25

## 2017-09-25 VITALS
HEIGHT: 73 IN | DIASTOLIC BLOOD PRESSURE: 78 MMHG | BODY MASS INDEX: 31.44 KG/M2 | HEART RATE: 66 BPM | SYSTOLIC BLOOD PRESSURE: 156 MMHG | WEIGHT: 237.2 LBS

## 2017-09-25 DIAGNOSIS — K62.5 RECTAL BLEEDING: Primary | ICD-10-CM

## 2017-09-25 DIAGNOSIS — D50.9 IRON DEFICIENCY ANEMIA, UNSPECIFIED IRON DEFICIENCY ANEMIA TYPE: ICD-10-CM

## 2017-09-25 DIAGNOSIS — R19.4 CHANGE IN BOWEL HABITS: ICD-10-CM

## 2017-09-25 PROCEDURE — 99214 OFFICE O/P EST MOD 30 MIN: CPT | Performed by: NURSE PRACTITIONER

## 2017-09-25 RX ORDER — DEXTROSE AND SODIUM CHLORIDE 5; .45 G/100ML; G/100ML
30 INJECTION, SOLUTION INTRAVENOUS CONTINUOUS PRN
Status: CANCELLED | OUTPATIENT
Start: 2017-10-19

## 2017-09-25 RX ORDER — SODIUM, POTASSIUM,MAG SULFATES 17.5-3.13G
1 SOLUTION, RECONSTITUTED, ORAL ORAL EVERY 12 HOURS
Qty: 2 BOTTLE | Refills: 0 | Status: ON HOLD | OUTPATIENT
Start: 2017-09-25 | End: 2017-10-19

## 2017-09-25 NOTE — PROGRESS NOTES
Chief Complaint   Patient presents with   • Black or Bloody Stool       Subjective    German Ray is a 66 y.o. male. he is here today for follow-up.    History of Present Illness  66 year old Presents to discuss recent rectal bleeding.  States he noticed rectal bleeding about 9 months ago however has progressively worsened in the last 4 weeks.  Review of hemoglobin and hematocrit no data has been doing 12.5 now 11.9.  Iron studies reveal it to be iron deficiency and he was on iron twice a day which has been increased to 3 times a day.  He reports mild fatigue and weakness.  He had similar symptoms last year and underwent colonoscopy which noted a localized area of mildly altered vascular mucosa in the rectum.  Radiation proctitis.  Diverticulosis in the sigmoid colon.  Plan; we'll schedule patient for colonoscopy to further evaluate rectal bleeding possible worsening rectal proctitis.     The following portions of the patient's history were reviewed and updated as appropriate:   Past Medical History:   Diagnosis Date   • Anemia    • Depression     Major, single episode, unspecified   • Diverticular disease of colon    • Encounter for screening for diabetes mellitus    • Encounter for screening for endocrine disorder     Suspected   • Hemorrhage of anus and rectum    • Hyperlipidemia    • Hypertension    • Hypokalemia    • Primary malignant neoplasm of prostate     prostate   • Radiation proctitis    • Vitamin D deficiency      Past Surgical History:   Procedure Laterality Date   • APPENDECTOMY     • CATARACT EXTRACTION Bilateral 2013   • COLONOSCOPY      diverticulosis in hte sigmoid colon. Altered vascular mucosa in hte rectum. hemorrhoids, no specimens collected, radation proctitis     Family History   Problem Relation Age of Onset   • Cancer Mother    • Hypertension Brother        Current Outpatient Prescriptions   Medication Sig Dispense Refill   • Calcium Carb-Cholecalciferol (OSTEO-PORETICAL) 600-1000  MG-UNIT tablet Take 1 tablet by mouth daily.     • citalopram (CELEXA) 20 MG tablet Take 2 tablets by mouth Daily. 31 tablet 11   • diltiaZEM (TIAZAC) 360 MG 24 hr capsule Take 1 capsule by mouth Daily. 30 capsule 11   • doxazosin (CARDURA) 8 MG tablet Take 1 tablet by mouth Every Night. 30 tablet 11   • ferrous sulfate 325 (65 FE) MG EC tablet Take 1 tablet by mouth 3 (Three) Times a Day With Meals. 90 tablet 11   • hydrochlorothiazide (HYDRODIURIL) 50 MG tablet Take 1 tablet by mouth Daily. 30 tablet 11   • potassium chloride (K-DUR) 10 MEQ CR tablet Take 1 tablet by mouth 2 (Two) Times a Day. 60 tablet 11   • pravastatin (PRAVACHOL) 40 MG tablet Take 1 tablet by mouth Every Night. 30 tablet 11   • sildenafil (VIAGRA) 25 MG tablet Take 1 tablet by mouth As Needed for erectile dysfunction. 10 tablet 3   • telmisartan (MICARDIS) 80 MG tablet Take 1 tablet by mouth Daily. 30 tablet 11   • sodium-potassium-magnesium sulfates (SUPREP BOWEL PREP KIT) 17.5-3.13-1.6 GM/180ML solution oral solution Take 1 bottle by mouth Every 12 (Twelve) Hours. 2 bottle 0     No current facility-administered medications for this visit.      No Known Allergies  Social History     Social History   • Marital status:      Spouse name: N/A   • Number of children: N/A   • Years of education: N/A     Social History Main Topics   • Smoking status: Never Smoker   • Smokeless tobacco: Never Used   • Alcohol use Yes      Comment: occasionally   • Drug use: No   • Sexual activity: Defer     Other Topics Concern   • None     Social History Narrative       Review of Systems  Review of Systems   Constitutional: Positive for activity change and fatigue. Negative for appetite change, chills, diaphoresis, fever and unexpected weight change.   HENT: Negative for sore throat and trouble swallowing.    Respiratory: Negative for shortness of breath.    Gastrointestinal: Positive for blood in stool. Negative for abdominal distention, abdominal pain, anal  "bleeding, constipation, diarrhea, nausea, rectal pain and vomiting.   Musculoskeletal: Negative for arthralgias.   Skin: Negative for pallor.   Neurological: Negative for light-headedness.        /78 (BP Location: Left arm, Patient Position: Sitting, Cuff Size: Adult)  Pulse 66  Ht 73\" (185.4 cm)  Wt 237 lb 3.2 oz (108 kg)  BMI 31.29 kg/m2    Objective    Physical Exam   Constitutional: He is oriented to person, place, and time. He appears well-developed and well-nourished. He is cooperative. No distress.   HENT:   Head: Normocephalic and atraumatic.   Neck: Normal range of motion. Neck supple. No thyromegaly present.   Cardiovascular: Normal rate, regular rhythm and normal heart sounds.    Pulmonary/Chest: Effort normal and breath sounds normal. He has no wheezes. He has no rhonchi. He has no rales.   Abdominal: Soft. Normal appearance and bowel sounds are normal. He exhibits no shifting dullness and no distension. There is no hepatosplenomegaly. There is no tenderness. There is no rigidity and no guarding. No hernia.   Lymphadenopathy:     He has no cervical adenopathy.   Neurological: He is alert and oriented to person, place, and time.   Skin: Skin is warm, dry and intact. No rash noted. No pallor.   Psychiatric: He has a normal mood and affect. His speech is normal.     Orders Only on 07/29/2017   Component Date Value Ref Range Status   • Fecal Occult Blood 08/04/2017 Positive* Negative Final   • Total Bilirubin 08/03/2017 0.2  0.2 - 1.3 mg/dL Final   • Bilirubin, Direct 08/03/2017 0.0  0.0 - 0.3 mg/dL Final   • Bilirubin, Indirect 08/03/2017 0.2  0.0 - 1.1 mg/dL Final   • Pathologist Interpretation 08/03/2017 Microcytic- hypochromic anemia most likely Iron deficiency state. Suggest clinical correlation.   Final   • LDH 08/03/2017 369  313 - 618 U/L Final   • Haptoglobin 08/03/2017 156  34 - 200 mg/dL Final     Assessment/Plan      1. Rectal bleeding    2. Change in bowel habits    3. Iron deficiency " anemia, unspecified iron deficiency anemia type    .     Orders placed during this encounter include:    COLONOSCOPY (N/A)    Review and/or summary of lab tests, radiology, procedures, medications. Review and summary of old records and obtaining of history. The risks and benefits of my recommendations, as well as other treatment options were discussed with the patient today. Questions were answered.    New Medications Ordered This Visit   Medications   • sodium-potassium-magnesium sulfates (SUPREP BOWEL PREP KIT) 17.5-3.13-1.6 GM/180ML solution oral solution     Sig: Take 1 bottle by mouth Every 12 (Twelve) Hours.     Dispense:  2 bottle     Refill:  0       Follow-up: Return in about 4 weeks (around 10/23/2017).          This document has been electronically signed by JOSE MIGUEL Ospina on September 25, 2017 1:41 PM             Results for orders placed or performed in visit on 07/29/17   Peripheral Blood Smear   Result Value Ref Range    Performed by:      Pathologist Interpretation       Microcytic- hypochromic anemia most likely Iron deficiency state. Suggest clinical correlation.   Occult Blood X 1, Stool   Result Value Ref Range    Fecal Occult Blood Positive (A) Negative   Bilirubin, Total & Direct   Result Value Ref Range    Total Bilirubin 0.2 0.2 - 1.3 mg/dL    Bilirubin, Direct 0.0 0.0 - 0.3 mg/dL    Bilirubin, Indirect 0.2 0.0 - 1.1 mg/dL   Lactate Dehydrogenase   Result Value Ref Range     313 - 618 U/L   Haptoglobin   Result Value Ref Range    Haptoglobin 156 34 - 200 mg/dL   Results for orders placed or performed in visit on 07/24/17   CBC Auto Differential   Result Value Ref Range    WBC 3.62 3.20 - 9.80 10*3/mm3    RBC 4.00 (L) 4.37 - 5.74 10*6/mm3    Hemoglobin 11.9 (L) 13.7 - 17.3 g/dL    Hematocrit 37.0 (L) 39.0 - 49.0 %    MCV 92.5 80.0 - 98.0 fL    MCH 29.8 26.5 - 34.0 pg    MCHC 32.2 31.5 - 36.3 g/dL    RDW 14.4 11.5 - 14.5 %    RDW-SD 48.6 (H) 35.1 - 43.9 fl    MPV 9.7 8.0 - 12.0 fL     Platelets 264 150 - 450 10*3/mm3    Neutrophil % 64.2 37.0 - 80.0 %    Lymphocyte % 20.2 10.0 - 50.0 %    Monocyte % 12.2 (H) 0.0 - 12.0 %    Eosinophil % 2.5 0.0 - 7.0 %    Basophil % 0.6 0.0 - 2.0 %    Immature Grans % 0.3 0.0 - 0.5 %    Neutrophils, Absolute 2.33 2.00 - 8.60 10*3/mm3    Lymphocytes, Absolute 0.73 0.60 - 4.20 10*3/mm3    Monocytes, Absolute 0.44 0.00 - 0.90 10*3/mm3    Eosinophils, Absolute 0.09 0.00 - 0.70 10*3/mm3    Basophils, Absolute 0.02 0.00 - 0.20 10*3/mm3    Immature Grans, Absolute 0.01 0.00 - 0.02 10*3/mm3   Iron Profile   Result Value Ref Range    Iron 29 (L) 49 - 181 mcg/dL    TIBC 342 261 - 462 mcg/dL    Iron Saturation 8 (L) 20 - 55 %   Reticulocytes   Result Value Ref Range    Reticulocyte % 1.37 0.64 - 2.26 %    Reticulocyte Absolute 0.0548 0.0250 - 0.1250 10*6/mm3    Immature Reticulocyte Fraction 16.9 (H) 3.0 - 15.9 %    Reticulocyte Production Index 0.80 0.64 - 2.26 %    Hematocrit 37.0 (L) 39.0 - 49.0 %    Reticulocyte Hgb 34.8 30.0 - 38.0 pg   Hgb. Frac. Profile   Result Value Ref Range    Hgb Solubility Negative Negative    Hgb F Quant 0.0 0.0 - 2.0 %    Hgb A 97.8 94.0 - 98.0 %    Hgb S 0.0 0.0 %    Hgb C 0.0 0.0 %    Hgb A2 Quant 2.2 0.7 - 3.1 %    Hgb Interp. Comment    Folate   Result Value Ref Range    Folate 12.00 2.76 - 21.00 ng/mL   Ferritin   Result Value Ref Range    Ferritin 20.30 17.90 - 464.00 ng/mL   Vitamin B12   Result Value Ref Range    Vitamin B-12 456 239 - 931 pg/mL   Results for orders placed or performed in visit on 03/01/17   Lipid Panel   Result Value Ref Range    Total Cholesterol 182 0 - 199 mg/dL    Triglycerides 87 20 - 199 mg/dL    HDL Cholesterol 68 60 - 200 mg/dL    LDL Cholesterol  73 1 - 129 mg/dL    LDL/HDL Ratio 1.42 0.00 - 3.55   Comprehensive Metabolic Panel   Result Value Ref Range    Glucose 98 60 - 100 mg/dL    BUN 14 7 - 21 mg/dL    Creatinine 1.09 0.70 - 1.30 mg/dL    Sodium 141 137 - 145 mmol/L    Potassium 3.5 3.5 - 5.1 mmol/L     Chloride 102 95 - 110 mmol/L    CO2 32.0 (H) 22.0 - 31.0 mmol/L    Calcium 9.3 8.4 - 10.2 mg/dL    Total Protein 7.0 6.3 - 8.6 g/dL    Albumin 4.10 3.40 - 4.80 g/dL    ALT (SGPT) 24 21 - 72 U/L    AST (SGOT) 16 (L) 17 - 59 U/L    Alkaline Phosphatase 70 38 - 126 U/L    Total Bilirubin 0.5 0.2 - 1.3 mg/dL    eGFR   Amer 82 49 - 113 mL/min/1.73    Globulin 2.9 2.3 - 3.5 gm/dL    A/G Ratio 1.4 1.1 - 1.8 g/dL    BUN/Creatinine Ratio 12.8 7.0 - 25.0    Anion Gap 7.0 5.0 - 15.0 mmol/L   Results for orders placed or performed during the hospital encounter of 10/20/16   Hepatitis Panel, Acute   Result Value Ref Range    Hepatitis B Surface Ag Negative Negative    Hep C Virus Ab Negative Negative    Hep A IgM Negative Negative    Hep B Core IgM Negative Negative   CBC & Differential   Result Value Ref Range    WBC 6.1 3.2 - 9.8 x1000/uL    RBC 3.93 (L) 4.37 - 5.74 garret/mm3    Hemoglobin 11.5 (L) 13.7 - 17.3 gm/dl    Hematocrit 35.4 (L) 39.0 - 49.0 %    MCV 90.1 80.0 - 98.0 fl    MCH 29.3 26.0 - 34.0 pg    MCHC 32.5 31.5 - 36.3 gm/dl    RDW 13.8 11.5 - 14.5 %    Platelets 262 150 - 450 x1000/mm3    MPV 10.2 8.0 - 12.0 fl    Neutrophil Rel % 78.2 37.0 - 80.0 %    Lymphocyte Rel % 14.0 10.0 - 50.0 %    Monocyte Rel % 6.0 0.0 - 12.0 %    Eosinophil Rel % 1.6 0.0 - 7.0 %    Basophil Rel % 0.0 0.0 - 2.0 %    Immature Granulocyte Rel % 0.20 0.00 - 0.50 %    Neutrophils Absolute 4.79 2.00 - 8.60 x1000/uL    Lymphocytes Absolute 0.86 0.60 - 4.20 x1000/uL    Monocytes Absolute 0.37 0.00 - 0.90 x1000/uL    Eosinophils Absolute 0.10 0.00 - 0.70 x1000/uL    Basophils Absolute 0.00 0.00 - 0.20 x1000/uL    Immature Granulocytes Absolute 0.010 0.005 - 0.022 x1000/uL    nRBC 0.0 0.0 - 0.2 %    nRBC 0.000 x1000/uL     *Note: Due to a large number of results and/or encounters for the requested time period, some results have not been displayed. A complete set of results can be found in Results Review.

## 2017-10-19 ENCOUNTER — ANESTHESIA EVENT (OUTPATIENT)
Dept: GASTROENTEROLOGY | Facility: HOSPITAL | Age: 67
End: 2017-10-19

## 2017-10-19 ENCOUNTER — ANESTHESIA (OUTPATIENT)
Dept: GASTROENTEROLOGY | Facility: HOSPITAL | Age: 67
End: 2017-10-19

## 2017-10-19 ENCOUNTER — HOSPITAL ENCOUNTER (OUTPATIENT)
Facility: HOSPITAL | Age: 67
Setting detail: HOSPITAL OUTPATIENT SURGERY
Discharge: HOME | End: 2017-10-19
Attending: INTERNAL MEDICINE | Admitting: INTERNAL MEDICINE

## 2017-10-19 VITALS
HEART RATE: 63 BPM | OXYGEN SATURATION: 97 % | RESPIRATION RATE: 18 BRPM | SYSTOLIC BLOOD PRESSURE: 122 MMHG | TEMPERATURE: 97.6 F | HEIGHT: 73 IN | DIASTOLIC BLOOD PRESSURE: 74 MMHG | BODY MASS INDEX: 29.66 KG/M2 | WEIGHT: 223.77 LBS

## 2017-10-19 DIAGNOSIS — R19.4 CHANGE IN BOWEL HABITS: ICD-10-CM

## 2017-10-19 DIAGNOSIS — D50.9 IRON DEFICIENCY ANEMIA, UNSPECIFIED IRON DEFICIENCY ANEMIA TYPE: ICD-10-CM

## 2017-10-19 DIAGNOSIS — K62.5 RECTAL BLEEDING: ICD-10-CM

## 2017-10-19 PROCEDURE — 25010000002 PROPOFOL 10 MG/ML EMULSION: Performed by: NURSE ANESTHETIST, CERTIFIED REGISTERED

## 2017-10-19 PROCEDURE — 88305 TISSUE EXAM BY PATHOLOGIST: CPT | Performed by: INTERNAL MEDICINE

## 2017-10-19 PROCEDURE — 88305 TISSUE EXAM BY PATHOLOGIST: CPT | Performed by: PATHOLOGY

## 2017-10-19 PROCEDURE — 45380 COLONOSCOPY AND BIOPSY: CPT | Performed by: INTERNAL MEDICINE

## 2017-10-19 RX ORDER — DEXTROSE AND SODIUM CHLORIDE 5; .45 G/100ML; G/100ML
30 INJECTION, SOLUTION INTRAVENOUS CONTINUOUS PRN
Status: DISCONTINUED | OUTPATIENT
Start: 2017-10-19 | End: 2017-10-19 | Stop reason: HOSPADM

## 2017-10-19 RX ORDER — ONDANSETRON 2 MG/ML
4 INJECTION INTRAMUSCULAR; INTRAVENOUS ONCE AS NEEDED
Status: DISCONTINUED | OUTPATIENT
Start: 2017-10-19 | End: 2017-10-19 | Stop reason: HOSPADM

## 2017-10-19 RX ORDER — PROMETHAZINE HYDROCHLORIDE 25 MG/ML
12.5 INJECTION, SOLUTION INTRAMUSCULAR; INTRAVENOUS ONCE AS NEEDED
Status: DISCONTINUED | OUTPATIENT
Start: 2017-10-19 | End: 2017-10-19 | Stop reason: HOSPADM

## 2017-10-19 RX ORDER — PROMETHAZINE HYDROCHLORIDE 25 MG/1
25 SUPPOSITORY RECTAL ONCE AS NEEDED
Status: DISCONTINUED | OUTPATIENT
Start: 2017-10-19 | End: 2017-10-19 | Stop reason: HOSPADM

## 2017-10-19 RX ORDER — DEXAMETHASONE SODIUM PHOSPHATE 4 MG/ML
8 INJECTION, SOLUTION INTRA-ARTICULAR; INTRALESIONAL; INTRAMUSCULAR; INTRAVENOUS; SOFT TISSUE ONCE AS NEEDED
Status: DISCONTINUED | OUTPATIENT
Start: 2017-10-19 | End: 2017-10-19 | Stop reason: HOSPADM

## 2017-10-19 RX ORDER — PROMETHAZINE HYDROCHLORIDE 25 MG/1
25 TABLET ORAL ONCE AS NEEDED
Status: DISCONTINUED | OUTPATIENT
Start: 2017-10-19 | End: 2017-10-19 | Stop reason: HOSPADM

## 2017-10-19 RX ORDER — PROPOFOL 10 MG/ML
VIAL (ML) INTRAVENOUS AS NEEDED
Status: DISCONTINUED | OUTPATIENT
Start: 2017-10-19 | End: 2017-10-19 | Stop reason: SURG

## 2017-10-19 RX ADMIN — PROPOFOL 60 MG: 10 INJECTION, EMULSION INTRAVENOUS at 15:53

## 2017-10-19 RX ADMIN — PROPOFOL 70 MG: 10 INJECTION, EMULSION INTRAVENOUS at 15:44

## 2017-10-19 RX ADMIN — PROPOFOL 70 MG: 10 INJECTION, EMULSION INTRAVENOUS at 15:48

## 2017-10-19 RX ADMIN — DEXTROSE AND SODIUM CHLORIDE 30 ML/HR: 5; 450 INJECTION, SOLUTION INTRAVENOUS at 14:27

## 2017-10-19 NOTE — PLAN OF CARE
Problem: Patient Care Overview (Adult)  Goal: Plan of Care Review  Outcome: Outcome(s) achieved Date Met:  10/19/17    10/19/17 1606   Coping/Psychosocial Response Interventions   Plan Of Care Reviewed With patient   Patient Care Overview   Progress no change   Outcome Evaluation   Outcome Summary/Follow up Plan pt alert, vss         Problem: GI Endoscopy (Adult)  Goal: Signs and Symptoms of Listed Potential Problems Will be Absent or Manageable (GI Endoscopy)  Outcome: Outcome(s) achieved Date Met:  10/19/17    10/19/17 1606   GI Endoscopy   Problems Assessed (GI Endoscopy) all   Problems Present (GI Endoscopy) none

## 2017-10-19 NOTE — ANESTHESIA PREPROCEDURE EVALUATION
Anesthesia Evaluation     Patient summary reviewed and Nursing notes reviewed   no history of anesthetic complications:  NPO Solid Status: > 8 hours  NPO Liquid Status: > 2 hours     Airway   Mallampati: II  TM distance: >3 FB  Neck ROM: full  no difficulty expected  Dental - normal exam     Pulmonary - negative pulmonary ROS and normal exam    breath sounds clear to auscultation  (-) shortness of breath  Cardiovascular - normal exam  Exercise tolerance: good (4-7 METS)    Rhythm: regular  Rate: normal    (+) hypertension well controlled, hyperlipidemia  (-) CAD, angina, orthopnea, BERMAN      Neuro/Psych- negative ROS  (-) CVA  GI/Hepatic/Renal/Endo - negative ROS     Musculoskeletal (-) negative ROS    Abdominal  - normal exam   Substance History - negative use     OB/GYN negative ob/gyn ROS         Other - negative ROS                                       Anesthesia Plan    ASA 2     MAC     intravenous induction   Anesthetic plan and risks discussed with patient.

## 2017-10-19 NOTE — PLAN OF CARE
Problem: GI Endoscopy (Adult)  Goal: Signs and Symptoms of Listed Potential Problems Will be Absent or Manageable (GI Endoscopy)  Outcome: Ongoing (interventions implemented as appropriate)    10/19/17 1558   GI Endoscopy   Problems Assessed (GI Endoscopy) all   Problems Present (GI Endoscopy) none

## 2017-10-19 NOTE — ANESTHESIA POSTPROCEDURE EVALUATION
Patient: German Ray    Procedure Summary     Date Anesthesia Start Anesthesia Stop Room / Location    10/19/17 [id number] [facility] ENDOSCOPY 3 / [facility] ENDOSCOPY       Procedure Diagnosis Surgeon Provider    COLONOSCOPY (N/A ) Rectal bleeding; Change in bowel habits; Iron deficiency anemia, unspecified iron deficiency anemia type  (Rectal bleeding [K62.5]; Change in bowel habits [R19.4]; Iron deficiency anemia, unspecified iron deficiency anemia type [D50.9]) MD David Singer CRNA          Anesthesia Type: MAC  Last vitals  BP   (!) 173/103 (10/19/17 1415)   Temp   98.1 °F (36.7 °C) (10/19/17 1415)   Pulse   80 (10/19/17 1415)   Resp   18 (10/19/17 1415)     SpO2   97 % (10/19/17 1415)     Post Anesthesia Care and Evaluation    Patient location during evaluation: bedside  Patient participation: complete - patient participated  Level of consciousness: awake and alert  Pain management: adequate  Airway patency: patent  Anesthetic complications: No anesthetic complications    Cardiovascular status: acceptable  Respiratory status: acceptable  Hydration status: acceptable

## 2017-10-19 NOTE — PLAN OF CARE
Problem: Patient Care Overview (Adult)  Goal: Plan of Care Review  Outcome: Ongoing (interventions implemented as appropriate)    10/19/17 2198   Coping/Psychosocial Response Interventions   Plan Of Care Reviewed With patient   Patient Care Overview   Progress no change   Outcome Evaluation   Outcome Summary/Follow up Plan vss

## 2017-10-23 LAB
LAB AP CASE REPORT: NORMAL
Lab: NORMAL
PATH REPORT.FINAL DX SPEC: NORMAL
PATH REPORT.GROSS SPEC: NORMAL

## 2017-10-30 ENCOUNTER — OFFICE VISIT (OUTPATIENT)
Dept: GASTROENTEROLOGY | Facility: CLINIC | Age: 67
End: 2017-10-30

## 2017-10-30 VITALS
SYSTOLIC BLOOD PRESSURE: 124 MMHG | HEIGHT: 73 IN | WEIGHT: 228.5 LBS | BODY MASS INDEX: 30.28 KG/M2 | HEART RATE: 67 BPM | DIASTOLIC BLOOD PRESSURE: 78 MMHG

## 2017-10-30 DIAGNOSIS — K62.6 RECTAL/ANAL ULCER: ICD-10-CM

## 2017-10-30 DIAGNOSIS — K92.1 BLOOD IN STOOL: Primary | ICD-10-CM

## 2017-10-30 PROCEDURE — 99214 OFFICE O/P EST MOD 30 MIN: CPT | Performed by: NURSE PRACTITIONER

## 2017-10-30 RX ORDER — MESALAMINE 1000 MG/1
1000 SUPPOSITORY RECTAL NIGHTLY
Qty: 42 SUPPOSITORY | Refills: 0 | Status: SHIPPED | OUTPATIENT
Start: 2017-10-30 | End: 2017-12-11

## 2017-10-30 NOTE — PROGRESS NOTES
Chief Complaint   Patient presents with   • Results     Colon 10/19/2017       Subjective    German Ray is a 66 y.o. male. he is here today for follow-up.    History of Present Illness  66 year old Presents to discuss recent rectal bleeding.  States he noticed rectal bleeding about 9 months ago. Patient reports rectal bleeding has improved and is occurring intermittently at this time.  Previous review of hemoglobin and hematocrit no data has been doing 12.5 now 11.9.  Iron studies reveal it to be iron deficiency and he was on iron twice a day which has been increased to 3 times a day.  He has follow-up with his primary care provider on Wednesday with lab rechecked.  He reports mild fatigue and weakness.  He had similar symptoms last year and underwent colonoscopy which noted a localized area of mildly altered vascular mucosa in the rectum.  Radiation proctitis.  Diverticulosis in the sigmoid colon.  Colonoscopy was done 10/19/17 noted a fair prep and normal perianal digital rectal exam, external and internal hemorrhoids were found.  Multiple small and large mouth diverticula found the sigmoid, descending and transverse colon.  There was a localized area of moderately erythematous mucosa in the rectum.  Biopsies were taken.  Random biopsy noted no significant abnormality.  Rectal biopsy noted focal architectural distortion, possibly solitary rectal ulcer syndrome.  Plan; we'll start patient on Canasa suppositories daily for one month.  Follow-up in 3 months return to office sooner if needed.  I recommend daily fiber supplementation due to diverticular disease.  Patient states  he takes Metamucil daily.     The following portions of the patient's history were reviewed and updated as appropriate:   Past Medical History:   Diagnosis Date   • Anemia    • Depression     Major, single episode, unspecified   • Diverticular disease of colon    • Encounter for screening for diabetes mellitus    • Encounter for  screening for endocrine disorder     Suspected   • Hemorrhage of anus and rectum    • Hyperlipidemia    • Hypertension    • Hypokalemia    • Primary malignant neoplasm of prostate     prostate   • Radiation proctitis    • Vitamin D deficiency      Past Surgical History:   Procedure Laterality Date   • APPENDECTOMY     • CATARACT EXTRACTION Bilateral 2013   • COLONOSCOPY      diverticulosis in hte sigmoid colon. Altered vascular mucosa in hte rectum. hemorrhoids, no specimens collected, radation proctitis   • COLONOSCOPY N/A 10/19/2017    Procedure: COLONOSCOPY;  Surgeon: Patric Wooten MD;  Location: [facility] ENDOSCOPY;  Service:      Family History   Problem Relation Age of Onset   • Cancer Mother    • Hypertension Brother        Current Outpatient Prescriptions   Medication Sig Dispense Refill   • Calcium Carb-Cholecalciferol (OSTEO-PORETICAL) 600-1000 MG-UNIT tablet Take 1 tablet by mouth daily.     • citalopram (CELEXA) 20 MG tablet Take 2 tablets by mouth Daily. 31 tablet 11   • diltiaZEM (TIAZAC) 360 MG 24 hr capsule Take 1 capsule by mouth Daily. 30 capsule 11   • doxazosin (CARDURA) 8 MG tablet Take 1 tablet by mouth Every Night. 30 tablet 11   • ferrous sulfate 325 (65 FE) MG EC tablet Take 1 tablet by mouth 3 (Three) Times a Day With Meals. 90 tablet 11   • hydrochlorothiazide (HYDRODIURIL) 50 MG tablet Take 1 tablet by mouth Daily. 30 tablet 11   • potassium chloride (K-DUR) 10 MEQ CR tablet Take 1 tablet by mouth 2 (Two) Times a Day. 60 tablet 11   • pravastatin (PRAVACHOL) 40 MG tablet Take 1 tablet by mouth Every Night. 30 tablet 11   • sildenafil (VIAGRA) 25 MG tablet Take 1 tablet by mouth As Needed for erectile dysfunction. 10 tablet 3   • telmisartan (MICARDIS) 80 MG tablet Take 1 tablet by mouth Daily. 30 tablet 11   • mesalamine (CANASA) 1000 MG suppository Insert 1 suppository into the rectum Every Night for 42 days. 42 suppository 0     No current facility-administered medications for this  "visit.      No Known Allergies  Social History     Social History   • Marital status:      Spouse name: N/A   • Number of children: N/A   • Years of education: N/A     Social History Main Topics   • Smoking status: Never Smoker   • Smokeless tobacco: Never Used   • Alcohol use Yes      Comment: occasionally   • Drug use: No   • Sexual activity: Defer     Other Topics Concern   • None     Social History Narrative       Review of Systems  Review of Systems   Constitutional: Positive for activity change and fatigue. Negative for appetite change, chills, diaphoresis, fever and unexpected weight change.   HENT: Negative for sore throat and trouble swallowing.    Respiratory: Negative for shortness of breath.    Gastrointestinal: Positive for blood in stool. Negative for abdominal distention, abdominal pain, anal bleeding, constipation, diarrhea, nausea, rectal pain and vomiting.   Musculoskeletal: Negative for arthralgias.   Skin: Negative for pallor.   Neurological: Negative for light-headedness.        /78 (BP Location: Left arm, Patient Position: Sitting, Cuff Size: Adult)  Pulse 67  Ht 73\" (185.4 cm)  Wt 228 lb 8 oz (104 kg)  BMI 30.15 kg/m2    Objective    Physical Exam   Constitutional: He is oriented to person, place, and time. He appears well-developed and well-nourished. He is cooperative. No distress.   HENT:   Head: Normocephalic and atraumatic.   Neck: Normal range of motion. Neck supple. No thyromegaly present.   Cardiovascular: Normal rate, regular rhythm and normal heart sounds.    Pulmonary/Chest: Effort normal and breath sounds normal. He has no wheezes. He has no rhonchi. He has no rales.   Abdominal: Soft. Normal appearance and bowel sounds are normal. He exhibits no shifting dullness and no distension. There is no hepatosplenomegaly. There is no tenderness. There is no rigidity and no guarding. No hernia.   Lymphadenopathy:     He has no cervical adenopathy.   Neurological: He is " alert and oriented to person, place, and time.   Skin: Skin is warm, dry and intact. No rash noted. No pallor.   Psychiatric: He has a normal mood and affect. His speech is normal.     Admission on 10/19/2017, Discharged on 10/19/2017   Component Date Value Ref Range Status   • Case Report 10/19/2017    Final                    Value:Surgical Pathology Report                         Case: [id number]                                  Authorizing Provider:  Patric Wooten MD         Collected:           10/19/2017 03:54 PM          Ordering Location:     [facility]             Received:            10/20/2017 08:32 AM                                 [place] [facility]                                                     Pathologist:           Oziel Britt MD                                                            Specimens:   1) - Large Intestine, colonic mucousa                                                               2) - Large Intestine, Rectum, cold bx tissue                                              • Final Diagnosis 10/19/2017    Final                    Value:This result contains rich text formatting which cannot be displayed here.   • Gross Description 10/19/2017    Final                    Value:This result contains rich text formatting which cannot be displayed here.     Assessment/Plan      1. Blood in stool    2. Rectal/anal ulcer    .     Orders placed during this encounter include:    * Surgery not found *    Review and/or summary of lab tests, radiology, procedures, medications. Review and summary of old records and obtaining of history. The risks and benefits of my recommendations, as well as other treatment options were discussed with the patient today. Questions were answered.    New Medications Ordered This Visit   Medications   • mesalamine (CANASA) 1000 MG suppository     Sig: Insert 1 suppository into the rectum Every Night for 42 days.     Dispense:  42 suppository      Refill:  0       Follow-up: Return in about 3 months (around 1/30/2018).          This document has been electronically signed by JOSE MIGUEL Ospina on October 30, 2017 2:33 PM             Results for orders placed or performed during the hospital encounter of 10/19/17   Tissue Pathology Exam - Tissue, Large Intestine   Result Value Ref Range    Case Report       Surgical Pathology Report                         Case: [id number]                                  Authorizing Provider:  Patric Wooten MD         Collected:           10/19/2017 03:54 PM          Ordering Location:     [facility]             Received:            10/20/2017 08:32 AM                                 [place] [facility]                                                     Pathologist:           Oziel Britt MD                                                            Specimens:   1) - Large Intestine, colonic mucousa                                                               2) - Large Intestine, Rectum, cold bx tissue                                               Final Diagnosis       1.  COLONIC MUCOSA, RANDOM BIOPSY:  NO SIGNIFICANT HISTOLOGIC ABNORMALITY.    2.  RECTUM, BIOPSY:  FOCAL ARCHITECTURAL DISTORTION, POSSIBLY SOLITARY RECTAL ULCER SYNDROME.      Gross Description       1.  Two tan fragments measure 0.7 x 0.4 x 0.2 cm together.  Totally submitted as 1A.     2.  Two tan fragments measure 0.6 x 0.6 x 0.2 cm together.  Totally submitted as 2A.       Embedded Images     Results for orders placed or performed in visit on 07/29/17   Peripheral Blood Smear   Result Value Ref Range    Performed by:      Pathologist Interpretation       Microcytic- hypochromic anemia most likely Iron deficiency state. Suggest clinical correlation.   Occult Blood X 1, Stool   Result Value Ref Range    Fecal Occult Blood Positive (A) Negative   Bilirubin, Total & Direct   Result Value Ref Range    Total Bilirubin 0.2 0.2 - 1.3 mg/dL     Bilirubin, Direct 0.0 0.0 - 0.3 mg/dL    Bilirubin, Indirect 0.2 0.0 - 1.1 mg/dL   Lactate Dehydrogenase   Result Value Ref Range     313 - 618 U/L   Haptoglobin   Result Value Ref Range    Haptoglobin 156 34 - 200 mg/dL   Results for orders placed or performed in visit on 07/24/17   CBC Auto Differential   Result Value Ref Range    WBC 3.62 3.20 - 9.80 10*3/mm3    RBC 4.00 (L) 4.37 - 5.74 10*6/mm3    Hemoglobin 11.9 (L) 13.7 - 17.3 g/dL    Hematocrit 37.0 (L) 39.0 - 49.0 %    MCV 92.5 80.0 - 98.0 fL    MCH 29.8 26.5 - 34.0 pg    MCHC 32.2 31.5 - 36.3 g/dL    RDW 14.4 11.5 - 14.5 %    RDW-SD 48.6 (H) 35.1 - 43.9 fl    MPV 9.7 8.0 - 12.0 fL    Platelets 264 150 - 450 10*3/mm3    Neutrophil % 64.2 37.0 - 80.0 %    Lymphocyte % 20.2 10.0 - 50.0 %    Monocyte % 12.2 (H) 0.0 - 12.0 %    Eosinophil % 2.5 0.0 - 7.0 %    Basophil % 0.6 0.0 - 2.0 %    Immature Grans % 0.3 0.0 - 0.5 %    Neutrophils, Absolute 2.33 2.00 - 8.60 10*3/mm3    Lymphocytes, Absolute 0.73 0.60 - 4.20 10*3/mm3    Monocytes, Absolute 0.44 0.00 - 0.90 10*3/mm3    Eosinophils, Absolute 0.09 0.00 - 0.70 10*3/mm3    Basophils, Absolute 0.02 0.00 - 0.20 10*3/mm3    Immature Grans, Absolute 0.01 0.00 - 0.02 10*3/mm3   Iron Profile   Result Value Ref Range    Iron 29 (L) 49 - 181 mcg/dL    TIBC 342 261 - 462 mcg/dL    Iron Saturation 8 (L) 20 - 55 %   Reticulocytes   Result Value Ref Range    Reticulocyte % 1.37 0.64 - 2.26 %    Reticulocyte Absolute 0.0548 0.0250 - 0.1250 10*6/mm3    Immature Reticulocyte Fraction 16.9 (H) 3.0 - 15.9 %    Reticulocyte Production Index 0.80 0.64 - 2.26 %    Hematocrit 37.0 (L) 39.0 - 49.0 %    Reticulocyte Hgb 34.8 30.0 - 38.0 pg   Hgb. Frac. Profile   Result Value Ref Range    Hgb Solubility Negative Negative    Hgb F Quant 0.0 0.0 - 2.0 %    Hgb A 97.8 94.0 - 98.0 %    Hgb S 0.0 0.0 %    Hgb C 0.0 0.0 %    Hgb A2 Quant 2.2 0.7 - 3.1 %    Hgb Interp. Comment    Folate   Result Value Ref Range    Folate 12.00 2.76 -  21.00 ng/mL   Ferritin   Result Value Ref Range    Ferritin 20.30 17.90 - 464.00 ng/mL   Vitamin B12   Result Value Ref Range    Vitamin B-12 456 239 - 931 pg/mL   Results for orders placed or performed in visit on 03/01/17   Lipid Panel   Result Value Ref Range    Total Cholesterol 182 0 - 199 mg/dL    Triglycerides 87 20 - 199 mg/dL    HDL Cholesterol 68 60 - 200 mg/dL    LDL Cholesterol  73 1 - 129 mg/dL    LDL/HDL Ratio 1.42 0.00 - 3.55   Comprehensive Metabolic Panel   Result Value Ref Range    Glucose 98 60 - 100 mg/dL    BUN 14 7 - 21 mg/dL    Creatinine 1.09 0.70 - 1.30 mg/dL    Sodium 141 137 - 145 mmol/L    Potassium 3.5 3.5 - 5.1 mmol/L    Chloride 102 95 - 110 mmol/L    CO2 32.0 (H) 22.0 - 31.0 mmol/L    Calcium 9.3 8.4 - 10.2 mg/dL    Total Protein 7.0 6.3 - 8.6 g/dL    Albumin 4.10 3.40 - 4.80 g/dL    ALT (SGPT) 24 21 - 72 U/L    AST (SGOT) 16 (L) 17 - 59 U/L    Alkaline Phosphatase 70 38 - 126 U/L    Total Bilirubin 0.5 0.2 - 1.3 mg/dL    eGFR   Amer 82 49 - 113 mL/min/1.73    Globulin 2.9 2.3 - 3.5 gm/dL    A/G Ratio 1.4 1.1 - 1.8 g/dL    BUN/Creatinine Ratio 12.8 7.0 - 25.0    Anion Gap 7.0 5.0 - 15.0 mmol/L   Results for orders placed or performed during the hospital encounter of 10/20/16   Hepatitis Panel, Acute   Result Value Ref Range    Hepatitis B Surface Ag Negative Negative    Hep C Virus Ab Negative Negative    Hep A IgM Negative Negative    Hep B Core IgM Negative Negative   CBC & Differential   Result Value Ref Range    WBC 6.1 3.2 - 9.8 x1000/uL    RBC 3.93 (L) 4.37 - 5.74 garret/mm3    Hemoglobin 11.5 (L) 13.7 - 17.3 gm/dl    Hematocrit 35.4 (L) 39.0 - 49.0 %    MCV 90.1 80.0 - 98.0 fl    MCH 29.3 26.0 - 34.0 pg    MCHC 32.5 31.5 - 36.3 gm/dl    RDW 13.8 11.5 - 14.5 %    Platelets 262 150 - 450 x1000/mm3    MPV 10.2 8.0 - 12.0 fl    Neutrophil Rel % 78.2 37.0 - 80.0 %    Lymphocyte Rel % 14.0 10.0 - 50.0 %    Monocyte Rel % 6.0 0.0 - 12.0 %    Eosinophil Rel % 1.6 0.0 - 7.0 %     Basophil Rel % 0.0 0.0 - 2.0 %    Immature Granulocyte Rel % 0.20 0.00 - 0.50 %    Neutrophils Absolute 4.79 2.00 - 8.60 x1000/uL    Lymphocytes Absolute 0.86 0.60 - 4.20 x1000/uL    Monocytes Absolute 0.37 0.00 - 0.90 x1000/uL    Eosinophils Absolute 0.10 0.00 - 0.70 x1000/uL    Basophils Absolute 0.00 0.00 - 0.20 x1000/uL    Immature Granulocytes Absolute 0.010 0.005 - 0.022 x1000/uL    nRBC 0.0 0.0 - 0.2 %    nRBC 0.000 x1000/uL     *Note: Due to a large number of results and/or encounters for the requested time period, some results have not been displayed. A complete set of results can be found in Results Review.

## 2017-11-15 ENCOUNTER — OFFICE VISIT (OUTPATIENT)
Dept: FAMILY MEDICINE CLINIC | Facility: CLINIC | Age: 67
End: 2017-11-15

## 2017-11-15 VITALS
HEART RATE: 79 BPM | RESPIRATION RATE: 16 BRPM | SYSTOLIC BLOOD PRESSURE: 138 MMHG | DIASTOLIC BLOOD PRESSURE: 80 MMHG | TEMPERATURE: 98.6 F | WEIGHT: 228.6 LBS | BODY MASS INDEX: 30.3 KG/M2 | HEIGHT: 73 IN

## 2017-11-15 DIAGNOSIS — Z00.00 GENERAL MEDICAL EXAMINATION: ICD-10-CM

## 2017-11-15 DIAGNOSIS — Z23 NEED FOR IMMUNIZATION AGAINST INFLUENZA: ICD-10-CM

## 2017-11-15 DIAGNOSIS — I10 ESSENTIAL HYPERTENSION: ICD-10-CM

## 2017-11-15 DIAGNOSIS — K62.5 RECTAL BLEEDING: ICD-10-CM

## 2017-11-15 DIAGNOSIS — K57.30 DIVERTICULOSIS OF LARGE INTESTINE WITHOUT HEMORRHAGE: ICD-10-CM

## 2017-11-15 DIAGNOSIS — E78.5 HYPERLIPIDEMIA, UNSPECIFIED HYPERLIPIDEMIA TYPE: ICD-10-CM

## 2017-11-15 DIAGNOSIS — D50.8 OTHER IRON DEFICIENCY ANEMIA: ICD-10-CM

## 2017-11-15 DIAGNOSIS — F32.A DEPRESSION, UNSPECIFIED DEPRESSION TYPE: ICD-10-CM

## 2017-11-15 DIAGNOSIS — D50.9 IRON DEFICIENCY ANEMIA, UNSPECIFIED IRON DEFICIENCY ANEMIA TYPE: ICD-10-CM

## 2017-11-15 DIAGNOSIS — E55.9 VITAMIN D DEFICIENCY: Primary | ICD-10-CM

## 2017-11-15 PROCEDURE — 99214 OFFICE O/P EST MOD 30 MIN: CPT | Performed by: FAMILY MEDICINE

## 2017-11-15 PROCEDURE — 90686 IIV4 VACC NO PRSV 0.5 ML IM: CPT | Performed by: FAMILY MEDICINE

## 2017-11-15 PROCEDURE — G0008 ADMIN INFLUENZA VIRUS VAC: HCPCS | Performed by: FAMILY MEDICINE

## 2017-11-15 RX ORDER — POTASSIUM CHLORIDE 750 MG/1
10 TABLET, FILM COATED, EXTENDED RELEASE ORAL 2 TIMES DAILY
Qty: 60 TABLET | Refills: 11 | Status: SHIPPED | OUTPATIENT
Start: 2017-11-15 | End: 2018-02-22 | Stop reason: SDUPTHER

## 2017-11-15 NOTE — PROGRESS NOTES
Subjective   German Ray is a 66 y.o. male.     History of Present Illness       Problem List  1.  Essential Hypertension  2. Hyperlipidemia ASCVD risk high   3. Depression  4. Hypokalemia  5. Bereavement  6. Iron deficiency anemia  7. Vitamin D deficiency  8. H/O prostate cancer sp radiation therapy and seed therapy - sees Oncologist every 6 months.  9. Diverticulosis  10. H/O Lower GI bleeding   11. Radiation proctitis/Rectal Bleeding/Rectal ulcer   12. Hemorrhoids  13. Lipoma of upper back  14. BPH  15. Erectile Dysfunction  16. Internal/External Hemorrhoids  17. Diverticulosis of sigmoid colon     Pt is is 65 yo AAM with the above medical issues. Is here for recheck. Pt has history of iron deficiency anemia and is taking iron pill 325 mg PO TID.  His last hemoglobin was 11.9 .  Also has history of hypertension and is on Cardura and HCTZ 25 and micardidis-HCTZ mg PO q daily.  For hyperlipidemia pt is taking pravastatin. For depression pt is taking celexa. Stable today.  BP slightly  Elevated today but at home runs <140/90. Denies any chest pain, headaches or dizziness. Last lipid panel was normal . States he is in good spirits.  He is better emotionally since his wife passed away last year.      Pt did see Gastroenterology for blood in stool and rectal bleeding a colonoscopy and subsequent biopsy for erythema  was done that showed a rectal ulcer. Pt was started on mesalamine 1000 mg rectal qhs.  He also has diverticulosis and internal/external hemorrhoids and fiber supplemetns were recommended. Pt denies any active bleeding and is tolerating suppository medication. He still has occasional weakness and fatigue but no dizziness or syncopal episodes. He is due for recheck on lipid panel,  Thyroid studies and hga1c.  Pt also needs refill on potassium pill BId    Pt would like flu vaccination today     The following portions of the patient's history were reviewed and updated as appropriate: allergies,  "current medications, past family history, past medical history, past social history, past surgical history and problem list.    Review of Systems   Constitutional: Negative.    HENT: Negative.    Eyes: Negative.    Respiratory: Negative.    Cardiovascular: Negative.    Gastrointestinal: Negative.    Endocrine: Negative.    Genitourinary: Negative.    Musculoskeletal: Negative.    Skin: Negative.    Allergic/Immunologic: Negative.    Neurological: Negative.    Hematological: Negative.    Psychiatric/Behavioral: Negative.        Objective    /80  Pulse 79  Temp 98.6 °F (37 °C)  Resp 16  Ht 73\" (185.4 cm)  Wt 228 lb 9.6 oz (104 kg)  BMI 30.16 kg/m2      /80  Pulse 79  Temp 98.6 °F (37 °C)  Resp 16  Ht 73\" (185.4 cm)  Wt 228 lb 9.6 oz (104 kg)  BMI 30.16 kg/m2      Chemistry        Component Value Date/Time     03/02/2017 1037    K 3.5 03/02/2017 1037     03/02/2017 1037    CO2 32.0 (H) 03/02/2017 1037    BUN 14 03/02/2017 1037    CREATININE 1.09 03/02/2017 1037        Component Value Date/Time    CALCIUM 9.3 03/02/2017 1037    ALKPHOS 70 03/02/2017 1037    AST 16 (L) 03/02/2017 1037    ALT 24 03/02/2017 1037    BILITOT 0.2 08/03/2017 1312        Lab Results   Component Value Date    WBC 3.62 07/24/2017    HGB 11.9 (L) 07/24/2017    HCT 37.0 (L) 07/24/2017    HCT 37.0 (L) 07/24/2017    MCV 92.5 07/24/2017     07/24/2017     .lastipid  Lab Results   Component Value Date    HGBA1C 5.6 08/31/2016     Lab Results   Component Value Date    TSH 3.11 05/20/2016     Admission on 10/19/2017, Discharged on 10/19/2017   Component Date Value Ref Range Status   • Case Report 10/19/2017    Final                    Value:Surgical Pathology Report                         Case: [id number]                                  Authorizing Provider:  Patric Wooten MD         Collected:           10/19/2017 03:54 PM          Ordering Location:     [facility]             Received:            " 10/20/2017 08:32 AM                                 [place] [facility]                                                     Pathologist:           Oziel Britt MD                                                            Specimens:   1) - Large Intestine, colonic mucousa                                                               2) - Large Intestine, Rectum, cold bx tissue                                              • Final Diagnosis 10/19/2017    Final                    Value:This result contains rich text formatting which cannot be displayed here.   • Gross Description 10/19/2017    Final                    Value:This result contains rich text formatting which cannot be displayed here.       Physical Exam   Constitutional: He is oriented to person, place, and time. He appears well-developed and well-nourished. No distress.   HENT:   Head: Normocephalic and atraumatic.   Right Ear: External ear normal.   Left Ear: External ear normal.   Eyes: Conjunctivae and EOM are normal. Pupils are equal, round, and reactive to light. Right eye exhibits no discharge. Left eye exhibits no discharge. No scleral icterus.   Neck: Normal range of motion. Neck supple. No JVD present. No tracheal deviation present. No thyromegaly present.   Cardiovascular: Normal rate, regular rhythm and normal heart sounds.    Pulmonary/Chest: Effort normal. No stridor.   Abdominal: Bowel sounds are normal. He exhibits no distension and no mass. There is no tenderness. There is no rebound and no guarding. No hernia.   Musculoskeletal: Normal range of motion. He exhibits no edema, tenderness or deformity.   Lymphadenopathy:     He has no cervical adenopathy.   Neurological: He is alert and oriented to person, place, and time. He has normal reflexes. No cranial nerve deficit. Coordination normal.   Skin: Skin is warm and dry. No rash noted. He is not diaphoretic. No erythema. No pallor.   Mole on lower back has stuck on appearance.  No  bleeding.  Possible sebborrheic keratosis    Psychiatric: He has a normal mood and affect. His behavior is normal.   Nursing note and vitals reviewed.      Assessment/Plan   Problems Addressed this Visit        Cardiovascular and Mediastinum    Hyperlipidemia    Essential hypertension       Digestive    Vitamin D deficiency  - Primary    Relevant Orders    Vitamin D 25 Hydroxy    Diverticulosis of large intestine without hemorrhage    Rectal bleeding       Hematopoietic and Hemostatic    Anemia    Relevant Orders    Comprehensive Metabolic Panel    CBC Auto Differential    Ferritin    Iron Profile    Iron deficiency anemia    Relevant Orders    Comprehensive Metabolic Panel    CBC Auto Differential    Ferritin    Iron Profile       Other    Depression      Other Visit Diagnoses     General medical examination        Relevant Orders    Comprehensive Metabolic Panel    CBC Auto Differential    Ferritin    Iron Profile    Lipid Panel    Hemoglobin A1c    TSH    T4, Free    T3, Free    Need for immunization against influenza        Relevant Orders    Flu Vaccine Quad PF 3YR+ (FLUARIX/FLUZONE [id number])      - Vitamin D deficiency - continue vitamin D pill  - Essential hypertension - increased HCTZ from 25 to 50 mg PO q daily.  Continue micardis, cardura. BP at goal today   - hyperlipidemia - check lipid panel.  Continue pravastatin  -rectal ulcer/diverticulosis - continue mesalamine suppository 1000 mg rectal at bedtime.  Recommend high fiber diet   -depression - stable on celexa    -Anemia - recheck CBC. Iron deficiency anemia. Continue iron pill TID  - will monitor mole on back.  Has stuck on appearance. Consider cryotherapy or shave biopsy in the future. Recheck in 1 month  - influenza vaccination today. Pt signed consent form  - recheck in 1 month

## 2017-11-16 PROCEDURE — 83550 IRON BINDING TEST: CPT | Performed by: FAMILY MEDICINE

## 2017-11-16 PROCEDURE — 84481 FREE ASSAY (FT-3): CPT | Performed by: FAMILY MEDICINE

## 2017-11-16 PROCEDURE — 80061 LIPID PANEL: CPT | Performed by: FAMILY MEDICINE

## 2017-11-16 PROCEDURE — 83036 HEMOGLOBIN GLYCOSYLATED A1C: CPT | Performed by: FAMILY MEDICINE

## 2017-11-16 PROCEDURE — 36415 COLL VENOUS BLD VENIPUNCTURE: CPT | Performed by: FAMILY MEDICINE

## 2017-11-16 PROCEDURE — 83540 ASSAY OF IRON: CPT | Performed by: FAMILY MEDICINE

## 2017-11-16 PROCEDURE — 84439 ASSAY OF FREE THYROXINE: CPT | Performed by: FAMILY MEDICINE

## 2017-11-16 PROCEDURE — 85025 COMPLETE CBC W/AUTO DIFF WBC: CPT | Performed by: FAMILY MEDICINE

## 2017-11-16 PROCEDURE — 82728 ASSAY OF FERRITIN: CPT | Performed by: FAMILY MEDICINE

## 2017-11-16 PROCEDURE — 82306 VITAMIN D 25 HYDROXY: CPT | Performed by: FAMILY MEDICINE

## 2017-11-16 PROCEDURE — 84443 ASSAY THYROID STIM HORMONE: CPT | Performed by: FAMILY MEDICINE

## 2017-11-16 PROCEDURE — 80053 COMPREHEN METABOLIC PANEL: CPT | Performed by: FAMILY MEDICINE

## 2017-11-17 LAB
25(OH)D3 SERPL-MCNC: 14.5 NG/ML (ref 30–100)
ALBUMIN SERPL-MCNC: 3.8 G/DL (ref 3.4–4.8)
ALBUMIN/GLOB SERPL: 1.2 G/DL (ref 1.1–1.8)
ALP SERPL-CCNC: 87 U/L (ref 38–126)
ALT SERPL W P-5'-P-CCNC: 22 U/L (ref 21–72)
ANION GAP SERPL CALCULATED.3IONS-SCNC: 12 MMOL/L (ref 5–15)
ARTICHOKE IGE QN: 101 MG/DL (ref 1–129)
AST SERPL-CCNC: 17 U/L (ref 17–59)
BASOPHILS # BLD AUTO: 0.03 10*3/MM3 (ref 0–0.2)
BASOPHILS NFR BLD AUTO: 0.7 % (ref 0–2)
BILIRUB SERPL-MCNC: 0.3 MG/DL (ref 0.2–1.3)
BUN BLD-MCNC: 15 MG/DL (ref 7–21)
BUN/CREAT SERPL: 13.5 (ref 7–25)
CALCIUM SPEC-SCNC: 9.9 MG/DL (ref 8.4–10.2)
CHLORIDE SERPL-SCNC: 100 MMOL/L (ref 95–110)
CHOLEST SERPL-MCNC: 168 MG/DL (ref 0–199)
CO2 SERPL-SCNC: 28 MMOL/L (ref 22–31)
CREAT BLD-MCNC: 1.11 MG/DL (ref 0.7–1.3)
DEPRECATED RDW RBC AUTO: 47.3 FL (ref 35.1–43.9)
EOSINOPHIL # BLD AUTO: 0.17 10*3/MM3 (ref 0–0.7)
EOSINOPHIL NFR BLD AUTO: 4 % (ref 0–7)
ERYTHROCYTE [DISTWIDTH] IN BLOOD BY AUTOMATED COUNT: 14 % (ref 11.5–14.5)
FERRITIN SERPL-MCNC: 15.8 NG/ML (ref 17.9–464)
GFR SERPL CREATININE-BSD FRML MDRD: 80 ML/MIN/1.73 (ref 49–113)
GLOBULIN UR ELPH-MCNC: 3.1 GM/DL (ref 2.3–3.5)
GLUCOSE BLD-MCNC: 93 MG/DL (ref 60–100)
HBA1C MFR BLD: 5.6 % (ref 4–5.6)
HCT VFR BLD AUTO: 37.5 % (ref 39–49)
HDLC SERPL-MCNC: 54 MG/DL (ref 60–200)
HGB BLD-MCNC: 11.9 G/DL (ref 13.7–17.3)
IMM GRANULOCYTES # BLD: 0.01 10*3/MM3 (ref 0–0.02)
IMM GRANULOCYTES NFR BLD: 0.2 % (ref 0–0.5)
IRON 24H UR-MRATE: 30 MCG/DL (ref 49–181)
IRON SATN MFR SERPL: 9 % (ref 20–55)
LDLC/HDLC SERPL: 1.76 {RATIO} (ref 0–3.55)
LYMPHOCYTES # BLD AUTO: 0.97 10*3/MM3 (ref 0.6–4.2)
LYMPHOCYTES NFR BLD AUTO: 22.6 % (ref 10–50)
MCH RBC QN AUTO: 29.1 PG (ref 26.5–34)
MCHC RBC AUTO-ENTMCNC: 31.7 G/DL (ref 31.5–36.3)
MCV RBC AUTO: 91.7 FL (ref 80–98)
MONOCYTES # BLD AUTO: 0.45 10*3/MM3 (ref 0–0.9)
MONOCYTES NFR BLD AUTO: 10.5 % (ref 0–12)
NEUTROPHILS # BLD AUTO: 2.66 10*3/MM3 (ref 2–8.6)
NEUTROPHILS NFR BLD AUTO: 62 % (ref 37–80)
PLATELET # BLD AUTO: 309 10*3/MM3 (ref 150–450)
PMV BLD AUTO: 9.7 FL (ref 8–12)
POTASSIUM BLD-SCNC: 3.5 MMOL/L (ref 3.5–5.1)
PROT SERPL-MCNC: 6.9 G/DL (ref 6.3–8.6)
RBC # BLD AUTO: 4.09 10*6/MM3 (ref 4.37–5.74)
SODIUM BLD-SCNC: 140 MMOL/L (ref 137–145)
T4 FREE SERPL-MCNC: 1.04 NG/DL (ref 0.78–2.19)
TIBC SERPL-MCNC: 352 MCG/DL (ref 261–462)
TRIGL SERPL-MCNC: 95 MG/DL (ref 20–199)
TSH SERPL DL<=0.05 MIU/L-ACNC: 2.26 MIU/ML (ref 0.46–4.68)
WBC NRBC COR # BLD: 4.29 10*3/MM3 (ref 3.2–9.8)

## 2017-11-18 LAB — T3FREE SERPL-MCNC: 2.7 PG/ML (ref 2–4.4)

## 2017-11-28 RX ORDER — ERGOCALCIFEROL 1.25 MG/1
50000 CAPSULE ORAL WEEKLY
Qty: 4 CAPSULE | Refills: 11 | Status: SHIPPED | OUTPATIENT
Start: 2017-11-28 | End: 2018-07-27 | Stop reason: SDUPTHER

## 2017-11-28 RX ORDER — PRAVASTATIN SODIUM 80 MG/1
80 TABLET ORAL NIGHTLY
Qty: 30 TABLET | Refills: 11 | Status: SHIPPED | OUTPATIENT
Start: 2017-11-28 | End: 2018-07-27 | Stop reason: SDUPTHER

## 2017-12-15 ENCOUNTER — OFFICE VISIT (OUTPATIENT)
Dept: FAMILY MEDICINE CLINIC | Facility: CLINIC | Age: 67
End: 2017-12-15

## 2017-12-15 VITALS
BODY MASS INDEX: 30.46 KG/M2 | DIASTOLIC BLOOD PRESSURE: 72 MMHG | HEART RATE: 64 BPM | TEMPERATURE: 98.6 F | SYSTOLIC BLOOD PRESSURE: 134 MMHG | RESPIRATION RATE: 16 BRPM | HEIGHT: 73 IN | WEIGHT: 229.8 LBS

## 2017-12-15 DIAGNOSIS — E78.5 HYPERLIPIDEMIA, UNSPECIFIED HYPERLIPIDEMIA TYPE: ICD-10-CM

## 2017-12-15 DIAGNOSIS — F32.A DEPRESSION, UNSPECIFIED DEPRESSION TYPE: ICD-10-CM

## 2017-12-15 DIAGNOSIS — D50.9 IRON DEFICIENCY ANEMIA, UNSPECIFIED IRON DEFICIENCY ANEMIA TYPE: ICD-10-CM

## 2017-12-15 DIAGNOSIS — L98.9 SKIN LESION: Primary | ICD-10-CM

## 2017-12-15 DIAGNOSIS — I10 ESSENTIAL HYPERTENSION: ICD-10-CM

## 2017-12-15 DIAGNOSIS — E55.9 VITAMIN D DEFICIENCY: ICD-10-CM

## 2017-12-15 PROCEDURE — 99214 OFFICE O/P EST MOD 30 MIN: CPT | Performed by: FAMILY MEDICINE

## 2017-12-15 RX ORDER — SILDENAFIL CITRATE 100 MG
100 TABLET ORAL DAILY PRN
COMMUNITY
Start: 2017-11-28 | End: 2018-01-30 | Stop reason: SDUPTHER

## 2017-12-15 NOTE — PROGRESS NOTES
Subjective   German Ray is a 67 y.o. male.     History of Present Illness       Problem List  1. Essential Hypertension  2. Hyperlipidemia ASCVD risk high   3. Depression  4. Hypokalemia  5. Bereavement  6. Iron deficiency anemia  7. Vitamin D deficiency  8. H/O prostate cancer sp radiation therapy and seed therapy - sees Oncologist every 6 months.  9. Diverticulosis  10. H/O Lower GI bleeding   11. Radiation proctitis/Rectal Bleeding/Rectal ulcer   12. Hemorrhoids  13. Lipoma of upper back  14. BPH  15. Erectile Dysfunction  16. Internal/External Hemorrhoids  17. Diverticulosis of sigmoid colon     Pt is is 67 yo AAM with the above medical issues. Is here for recheck on blood pressure  Pt has history of iron deficiency anemia and is taking iron pill 325 mg PO TID.  His last hemoglobin was 11.9 .  Also has history of hypertension and is on Cardura and HCTZ 25 and micardidis-HCTZ mg PO q daily.  For hyperlipidemia pt is taking pravastatin. For depression pt is taking celexa. Stable today.  BP slightly  Elevated today but at home runs <140/90. Denies any chest pain, headaches or dizziness. Last lipid panel was normal . States he is in good spirits.  He is better emotionally since his wife passed away last year.      Pt did see Gastroenterology for blood in stool and rectal bleeding a colonoscopy and subsequent biopsy for erythema  was done that showed a rectal ulcer. Pt was started on mesalamine 1000 mg rectal qhs.  He also has diverticulosis and internal/external hemorrhoids and fiber supplemetns were recommended. Pt denies any active bleeding and is tolerating suppository medication. He still has occasional weakness and fatigue but no dizziness or syncopal episodes. He is due for recheck on lipid panel,  Thyroid studies and hga1c.  Pt also needs refill on potassium pill BID    Pt is tolerating mesalmine 1000 mg rectal well. He has appt with Gastroenterology soon.     Mole on back is same size from last  "visit. Pt wants to see Dermatologist for further evaluation         The following portions of the patient's history were reviewed and updated as appropriate: allergies, current medications, past family history, past medical history, past social history, past surgical history and problem list.    Review of Systems   Constitutional: Negative.    HENT: Negative.    Eyes: Negative.    Respiratory: Negative.    Cardiovascular: Negative.    Gastrointestinal: Negative.    Endocrine: Negative.    Genitourinary: Negative.    Musculoskeletal: Negative.    Skin: Negative.    Allergic/Immunologic: Negative.    Neurological: Negative.    Hematological: Negative.    Psychiatric/Behavioral: Negative.        Objective    /72  Pulse 64  Temp 98.6 °F (37 °C)  Resp 16  Ht 185.4 cm (73\")  Wt 104 kg (229 lb 12.8 oz)  BMI 30.32 kg/m2          Chemistry        Component Value Date/Time     11/16/2017 1101    K 3.5 11/16/2017 1101     11/16/2017 1101    CO2 28.0 11/16/2017 1101    BUN 15 11/16/2017 1101    CREATININE 1.11 11/16/2017 1101        Component Value Date/Time    CALCIUM 9.9 11/16/2017 1101    ALKPHOS 87 11/16/2017 1101    AST 17 11/16/2017 1101    ALT 22 11/16/2017 1101    BILITOT 0.3 11/16/2017 1101        Lab Results   Component Value Date    WBC 4.29 11/16/2017    HGB 11.9 (L) 11/16/2017    HCT 37.5 (L) 11/16/2017    MCV 91.7 11/16/2017     11/16/2017     .lastipid  Lab Results   Component Value Date    HGBA1C 5.6 11/16/2017     Lab Results   Component Value Date    TSH 2.260 11/16/2017     Office Visit on 11/15/2017   Component Date Value Ref Range Status   • Glucose 11/16/2017 93  60 - 100 mg/dL Final   • BUN 11/16/2017 15  7 - 21 mg/dL Final   • Creatinine 11/16/2017 1.11  0.70 - 1.30 mg/dL Final   • Sodium 11/16/2017 140  137 - 145 mmol/L Final   • Potassium 11/16/2017 3.5  3.5 - 5.1 mmol/L Final   • Chloride 11/16/2017 100  95 - 110 mmol/L Final   • CO2 11/16/2017 28.0  22.0 - 31.0 mmol/L " Final   • Calcium 11/16/2017 9.9  8.4 - 10.2 mg/dL Final   • Total Protein 11/16/2017 6.9  6.3 - 8.6 g/dL Final   • Albumin 11/16/2017 3.80  3.40 - 4.80 g/dL Final   • ALT (SGPT) 11/16/2017 22  21 - 72 U/L Final   • AST (SGOT) 11/16/2017 17  17 - 59 U/L Final   • Alkaline Phosphatase 11/16/2017 87  38 - 126 U/L Final   • Total Bilirubin 11/16/2017 0.3  0.2 - 1.3 mg/dL Final   • eGFR   Amer 11/16/2017 80  49 - 113 mL/min/1.73 Final   • Globulin 11/16/2017 3.1  2.3 - 3.5 gm/dL Final   • A/G Ratio 11/16/2017 1.2  1.1 - 1.8 g/dL Final   • BUN/Creatinine Ratio 11/16/2017 13.5  7.0 - 25.0 Final   • Anion Gap 11/16/2017 12.0  5.0 - 15.0 mmol/L Final   • WBC 11/16/2017 4.29  3.20 - 9.80 10*3/mm3 Final   • RBC 11/16/2017 4.09* 4.37 - 5.74 10*6/mm3 Final   • Hemoglobin 11/16/2017 11.9* 13.7 - 17.3 g/dL Final   • Hematocrit 11/16/2017 37.5* 39.0 - 49.0 % Final   • MCV 11/16/2017 91.7  80.0 - 98.0 fL Final   • MCH 11/16/2017 29.1  26.5 - 34.0 pg Final   • MCHC 11/16/2017 31.7  31.5 - 36.3 g/dL Final   • RDW 11/16/2017 14.0  11.5 - 14.5 % Final   • RDW-SD 11/16/2017 47.3* 35.1 - 43.9 fl Final   • MPV 11/16/2017 9.7  8.0 - 12.0 fL Final   • Platelets 11/16/2017 309  150 - 450 10*3/mm3 Final   • Neutrophil % 11/16/2017 62.0  37.0 - 80.0 % Final   • Lymphocyte % 11/16/2017 22.6  10.0 - 50.0 % Final   • Monocyte % 11/16/2017 10.5  0.0 - 12.0 % Final   • Eosinophil % 11/16/2017 4.0  0.0 - 7.0 % Final   • Basophil % 11/16/2017 0.7  0.0 - 2.0 % Final   • Immature Grans % 11/16/2017 0.2  0.0 - 0.5 % Final   • Neutrophils, Absolute 11/16/2017 2.66  2.00 - 8.60 10*3/mm3 Final   • Lymphocytes, Absolute 11/16/2017 0.97  0.60 - 4.20 10*3/mm3 Final   • Monocytes, Absolute 11/16/2017 0.45  0.00 - 0.90 10*3/mm3 Final   • Eosinophils, Absolute 11/16/2017 0.17  0.00 - 0.70 10*3/mm3 Final   • Basophils, Absolute 11/16/2017 0.03  0.00 - 0.20 10*3/mm3 Final   • Immature Grans, Absolute 11/16/2017 0.01  0.00 - 0.02 10*3/mm3 Final   • Ferritin  11/16/2017 15.80* 17.90 - 464.00 ng/mL Final   • Iron 11/16/2017 30* 49 - 181 mcg/dL Final   • TIBC 11/16/2017 352  261 - 462 mcg/dL Final   • Iron Saturation 11/16/2017 9* 20 - 55 % Final   • Total Cholesterol 11/16/2017 168  0 - 199 mg/dL Final   • Triglycerides 11/16/2017 95  20 - 199 mg/dL Final   • HDL Cholesterol 11/16/2017 54* 60 - 200 mg/dL Final   • LDL Cholesterol  11/16/2017 101  1 - 129 mg/dL Final   • LDL/HDL Ratio 11/16/2017 1.76  0.00 - 3.55 Final   • Hemoglobin A1C 11/16/2017 5.6  4 - 5.6 % Final   • TSH 11/16/2017 2.260  0.460 - 4.680 mIU/mL Final   • Free T4 11/16/2017 1.04  0.78 - 2.19 ng/dL Final   • T3, Free 11/16/2017 2.7  2.0 - 4.4 pg/mL Final   • 25 Hydroxy, Vitamin D 11/16/2017 14.5* 30.0 - 100.0 ng/ml Final       Physical Exam   Constitutional: He is oriented to person, place, and time. He appears well-developed and well-nourished. No distress.   HENT:   Head: Normocephalic and atraumatic.   Right Ear: External ear normal.   Left Ear: External ear normal.   Eyes: Conjunctivae and EOM are normal. Pupils are equal, round, and reactive to light. Right eye exhibits no discharge. Left eye exhibits no discharge. No scleral icterus.   Neck: Normal range of motion. Neck supple. No JVD present. No tracheal deviation present. No thyromegaly present.   Cardiovascular: Normal rate, regular rhythm and normal heart sounds.    Pulmonary/Chest: Effort normal. No stridor.   Abdominal: Bowel sounds are normal. He exhibits no distension and no mass. There is no tenderness. There is no rebound and no guarding. No hernia.   Musculoskeletal: Normal range of motion. He exhibits no edema, tenderness or deformity.   Lymphadenopathy:     He has no cervical adenopathy.   Neurological: He is alert and oriented to person, place, and time. He has normal reflexes. No cranial nerve deficit. Coordination normal.   Skin: Skin is warm and dry. No rash noted. He is not diaphoretic. No erythema. No pallor.   Mole on lower back  has stuck on appearance.  No bleeding.  Possible sebborrheic keratosis    Psychiatric: He has a normal mood and affect. His behavior is normal.   Nursing note and vitals reviewed.      Assessment/Plan   Problems Addressed this Visit        Cardiovascular and Mediastinum    Hyperlipidemia - Primary    Essential hypertension       Digestive    Vitamin D deficiency        Hematopoietic and Hemostatic    Iron deficiency anemia       Other    Depression      - Vitamin D deficiency - continue vitamin D pill  - Essential hypertension - increased HCTZ from  50 mg PO q daily.  Continue micardis 80 mg daily , cardura. BP at goal today   - hyperlipidemia - check lipid panel.  Continue pravastatin  -rectal ulcer/diverticulosis - continue mesalamine suppository 1000 mg rectal at bedtime.  Recommend high fiber diet   -depression - stable on celexa    -Anemia - recheck CBC. Iron deficiency anemia. Continue iron pill TID  - Formole on back.  Has stuck on appearance. Consider cryotherapy or shave biopsy in the future. Refferral to Dr. Neves for further evaluation. Of mole Consultation appreciate  - influenza vaccination today. Pt signed consent form  - recheck in 1 month

## 2018-01-30 ENCOUNTER — OFFICE VISIT (OUTPATIENT)
Dept: GASTROENTEROLOGY | Facility: CLINIC | Age: 68
End: 2018-01-30

## 2018-01-30 VITALS
BODY MASS INDEX: 30.64 KG/M2 | DIASTOLIC BLOOD PRESSURE: 72 MMHG | WEIGHT: 231.2 LBS | HEIGHT: 73 IN | SYSTOLIC BLOOD PRESSURE: 136 MMHG | HEART RATE: 62 BPM

## 2018-01-30 DIAGNOSIS — K92.1 BLOOD IN STOOL: ICD-10-CM

## 2018-01-30 DIAGNOSIS — K62.6 RECTAL/ANAL ULCER: Primary | ICD-10-CM

## 2018-01-30 PROCEDURE — 99213 OFFICE O/P EST LOW 20 MIN: CPT | Performed by: NURSE PRACTITIONER

## 2018-01-30 RX ORDER — MESALAMINE 1000 MG/1
1000 SUPPOSITORY RECTAL NIGHTLY
Qty: 30 SUPPOSITORY | Refills: 0 | Status: SHIPPED | OUTPATIENT
Start: 2018-01-30 | End: 2018-03-01

## 2018-01-30 NOTE — PROGRESS NOTES
Chief Complaint   Patient presents with   • Black or Bloody Stool       Subjective    German Ray is a 67 y.o. male. he is here today for follow-up.    History of Present Illness  67-year-old male presents to discuss blood in stool.  History of radiation proctitis.  Has history of rectal ulcer and has bleeding intermittently from this.  He denies any abdominal pain, nausea, vomiting or changes in his bowel habits.  Reports has bowel movements about every day with intermittent bright red blood in the stool.  Reports occasional rectal pain associated with this.  In the past he has tried Canasa suppositories which helped significantly.  Plan; we'll refill Canasa suppositories follow-up in 6 months return to office sooner if needed.       The following portions of the patient's history were reviewed and updated as appropriate:   Past Medical History:   Diagnosis Date   • Anemia    • Depression     Major, single episode, unspecified   • Diverticular disease of colon    • Encounter for screening for diabetes mellitus    • Encounter for screening for endocrine disorder     Suspected   • Hemorrhage of anus and rectum    • Hyperlipidemia    • Hypertension    • Hypokalemia    • Primary malignant neoplasm of prostate     prostate   • Radiation proctitis    • Vitamin D deficiency      Past Surgical History:   Procedure Laterality Date   • APPENDECTOMY     • CATARACT EXTRACTION Bilateral 2013   • COLONOSCOPY      diverticulosis in hte sigmoid colon. Altered vascular mucosa in hte rectum. hemorrhoids, no specimens collected, radation proctitis   • COLONOSCOPY N/A 10/19/2017    Procedure: COLONOSCOPY;  Surgeon: Patric Wooten MD;  Location: [facility] ENDOSCOPY;  Service:      Family History   Problem Relation Age of Onset   • Cancer Mother    • Hypertension Brother        Current Outpatient Prescriptions   Medication Sig Dispense Refill   • Calcium Carb-Cholecalciferol (OSTEO-PORETICAL) 600-1000 MG-UNIT tablet Take 1  tablet by mouth daily.     • citalopram (CELEXA) 20 MG tablet Take 2 tablets by mouth Daily. 31 tablet 11   • diltiaZEM (TIAZAC) 360 MG 24 hr capsule Take 1 capsule by mouth Daily. 30 capsule 11   • doxazosin (CARDURA) 8 MG tablet Take 1 tablet by mouth Every Night. 30 tablet 11   • ferrous sulfate 325 (65 FE) MG EC tablet Take 1 tablet by mouth 3 (Three) Times a Day With Meals. 90 tablet 11   • hydrochlorothiazide (HYDRODIURIL) 50 MG tablet Take 1 tablet by mouth Daily. 30 tablet 11   • potassium chloride (K-DUR) 10 MEQ CR tablet Take 1 tablet by mouth 2 (Two) Times a Day. 60 tablet 11   • pravastatin (PRAVACHOL) 80 MG tablet Take 1 tablet by mouth Every Night. 30 tablet 11   • sildenafil (VIAGRA) 25 MG tablet Take 1 tablet by mouth As Needed for erectile dysfunction. 10 tablet 3   • telmisartan (MICARDIS) 80 MG tablet Take 1 tablet by mouth Daily. 30 tablet 11   • vitamin D (ERGOCALCIFEROL) 93842 units capsule capsule Take 1 capsule by mouth 1 (One) Time Per Week. 4 capsule 11   • mesalamine (CANASA) 1000 MG suppository Insert 1 suppository into the rectum Every Night for 30 days. 30 suppository 0     No current facility-administered medications for this visit.      No Known Allergies  Social History     Social History   • Marital status:      Spouse name: N/A   • Number of children: N/A   • Years of education: N/A     Social History Main Topics   • Smoking status: Never Smoker   • Smokeless tobacco: Never Used   • Alcohol use Yes      Comment: occasionally   • Drug use: No   • Sexual activity: Defer     Other Topics Concern   • None     Social History Narrative       Review of Systems  Review of Systems   Constitutional: Negative for activity change, appetite change, chills, diaphoresis, fatigue, fever and unexpected weight change.   HENT: Negative for sore throat and trouble swallowing.    Respiratory: Negative for shortness of breath.    Gastrointestinal: Positive for blood in stool and rectal pain  "(intermittent ). Negative for abdominal distention, abdominal pain, anal bleeding, constipation, diarrhea, nausea and vomiting.   Musculoskeletal: Negative for arthralgias.   Skin: Negative for pallor.   Neurological: Negative for light-headedness.        /72 (BP Location: Left arm, Patient Position: Sitting, Cuff Size: Adult)  Pulse 62  Ht 185.4 cm (72.99\")  Wt 105 kg (231 lb 3.2 oz)  BMI 30.51 kg/m2    Objective    Physical Exam   Constitutional: He is oriented to person, place, and time. He appears well-developed and well-nourished. He is cooperative. No distress.   HENT:   Head: Normocephalic and atraumatic.   Neck: Normal range of motion. Neck supple. No thyromegaly present.   Cardiovascular: Normal rate, regular rhythm and normal heart sounds.    Pulmonary/Chest: Effort normal and breath sounds normal. He has no wheezes. He has no rhonchi. He has no rales.   Abdominal: Soft. Normal appearance and bowel sounds are normal. He exhibits no shifting dullness and no distension. There is no hepatosplenomegaly. There is no tenderness. There is no rigidity and no guarding. No hernia.   Lymphadenopathy:     He has no cervical adenopathy.   Neurological: He is alert and oriented to person, place, and time.   Skin: Skin is warm, dry and intact. No rash noted. No pallor.   Psychiatric: He has a normal mood and affect. His speech is normal.     Office Visit on 11/15/2017   Component Date Value Ref Range Status   • Glucose 11/16/2017 93  60 - 100 mg/dL Final   • BUN 11/16/2017 15  7 - 21 mg/dL Final   • Creatinine 11/16/2017 1.11  0.70 - 1.30 mg/dL Final   • Sodium 11/16/2017 140  137 - 145 mmol/L Final   • Potassium 11/16/2017 3.5  3.5 - 5.1 mmol/L Final   • Chloride 11/16/2017 100  95 - 110 mmol/L Final   • CO2 11/16/2017 28.0  22.0 - 31.0 mmol/L Final   • Calcium 11/16/2017 9.9  8.4 - 10.2 mg/dL Final   • Total Protein 11/16/2017 6.9  6.3 - 8.6 g/dL Final   • Albumin 11/16/2017 3.80  3.40 - 4.80 g/dL Final   • " ALT (SGPT) 11/16/2017 22  21 - 72 U/L Final   • AST (SGOT) 11/16/2017 17  17 - 59 U/L Final   • Alkaline Phosphatase 11/16/2017 87  38 - 126 U/L Final   • Total Bilirubin 11/16/2017 0.3  0.2 - 1.3 mg/dL Final   • eGFR   Amer 11/16/2017 80  49 - 113 mL/min/1.73 Final   • Globulin 11/16/2017 3.1  2.3 - 3.5 gm/dL Final   • A/G Ratio 11/16/2017 1.2  1.1 - 1.8 g/dL Final   • BUN/Creatinine Ratio 11/16/2017 13.5  7.0 - 25.0 Final   • Anion Gap 11/16/2017 12.0  5.0 - 15.0 mmol/L Final   • WBC 11/16/2017 4.29  3.20 - 9.80 10*3/mm3 Final   • RBC 11/16/2017 4.09* 4.37 - 5.74 10*6/mm3 Final   • Hemoglobin 11/16/2017 11.9* 13.7 - 17.3 g/dL Final   • Hematocrit 11/16/2017 37.5* 39.0 - 49.0 % Final   • MCV 11/16/2017 91.7  80.0 - 98.0 fL Final   • MCH 11/16/2017 29.1  26.5 - 34.0 pg Final   • MCHC 11/16/2017 31.7  31.5 - 36.3 g/dL Final   • RDW 11/16/2017 14.0  11.5 - 14.5 % Final   • RDW-SD 11/16/2017 47.3* 35.1 - 43.9 fl Final   • MPV 11/16/2017 9.7  8.0 - 12.0 fL Final   • Platelets 11/16/2017 309  150 - 450 10*3/mm3 Final   • Neutrophil % 11/16/2017 62.0  37.0 - 80.0 % Final   • Lymphocyte % 11/16/2017 22.6  10.0 - 50.0 % Final   • Monocyte % 11/16/2017 10.5  0.0 - 12.0 % Final   • Eosinophil % 11/16/2017 4.0  0.0 - 7.0 % Final   • Basophil % 11/16/2017 0.7  0.0 - 2.0 % Final   • Immature Grans % 11/16/2017 0.2  0.0 - 0.5 % Final   • Neutrophils, Absolute 11/16/2017 2.66  2.00 - 8.60 10*3/mm3 Final   • Lymphocytes, Absolute 11/16/2017 0.97  0.60 - 4.20 10*3/mm3 Final   • Monocytes, Absolute 11/16/2017 0.45  0.00 - 0.90 10*3/mm3 Final   • Eosinophils, Absolute 11/16/2017 0.17  0.00 - 0.70 10*3/mm3 Final   • Basophils, Absolute 11/16/2017 0.03  0.00 - 0.20 10*3/mm3 Final   • Immature Grans, Absolute 11/16/2017 0.01  0.00 - 0.02 10*3/mm3 Final   • Ferritin 11/16/2017 15.80* 17.90 - 464.00 ng/mL Final   • Iron 11/16/2017 30* 49 - 181 mcg/dL Final   • TIBC 11/16/2017 352  261 - 462 mcg/dL Final   • Iron Saturation  11/16/2017 9* 20 - 55 % Final   • Total Cholesterol 11/16/2017 168  0 - 199 mg/dL Final   • Triglycerides 11/16/2017 95  20 - 199 mg/dL Final   • HDL Cholesterol 11/16/2017 54* 60 - 200 mg/dL Final   • LDL Cholesterol  11/16/2017 101  1 - 129 mg/dL Final   • LDL/HDL Ratio 11/16/2017 1.76  0.00 - 3.55 Final   • Hemoglobin A1C 11/16/2017 5.6  4 - 5.6 % Final   • TSH 11/16/2017 2.260  0.460 - 4.680 mIU/mL Final   • Free T4 11/16/2017 1.04  0.78 - 2.19 ng/dL Final   • T3, Free 11/16/2017 2.7  2.0 - 4.4 pg/mL Final   • 25 Hydroxy, Vitamin D 11/16/2017 14.5* 30.0 - 100.0 ng/ml Final     Assessment/Plan      1. Rectal/anal ulcer    2. Blood in stool    .         Review and/or summary of lab tests, radiology, procedures, medications. Review and summary of old records and obtaining of history. The risks and benefits of my recommendations, as well as other treatment options were discussed with the patient today. Questions were answered.    New Medications Ordered This Visit   Medications   • mesalamine (CANASA) 1000 MG suppository     Sig: Insert 1 suppository into the rectum Every Night for 30 days.     Dispense:  30 suppository     Refill:  0       Follow-up: Return in about 6 months (around 7/30/2018).          This document has been electronically signed by JOSE MIGUEL Ospina on January 30, 2018 4:37 PM             Results for orders placed or performed in visit on 11/15/17   CBC Auto Differential   Result Value Ref Range    WBC 4.29 3.20 - 9.80 10*3/mm3    RBC 4.09 (L) 4.37 - 5.74 10*6/mm3    Hemoglobin 11.9 (L) 13.7 - 17.3 g/dL    Hematocrit 37.5 (L) 39.0 - 49.0 %    MCV 91.7 80.0 - 98.0 fL    MCH 29.1 26.5 - 34.0 pg    MCHC 31.7 31.5 - 36.3 g/dL    RDW 14.0 11.5 - 14.5 %    RDW-SD 47.3 (H) 35.1 - 43.9 fl    MPV 9.7 8.0 - 12.0 fL    Platelets 309 150 - 450 10*3/mm3    Neutrophil % 62.0 37.0 - 80.0 %    Lymphocyte % 22.6 10.0 - 50.0 %    Monocyte % 10.5 0.0 - 12.0 %    Eosinophil % 4.0 0.0 - 7.0 %    Basophil % 0.7 0.0 -  2.0 %    Immature Grans % 0.2 0.0 - 0.5 %    Neutrophils, Absolute 2.66 2.00 - 8.60 10*3/mm3    Lymphocytes, Absolute 0.97 0.60 - 4.20 10*3/mm3    Monocytes, Absolute 0.45 0.00 - 0.90 10*3/mm3    Eosinophils, Absolute 0.17 0.00 - 0.70 10*3/mm3    Basophils, Absolute 0.03 0.00 - 0.20 10*3/mm3    Immature Grans, Absolute 0.01 0.00 - 0.02 10*3/mm3   Iron Profile   Result Value Ref Range    Iron 30 (L) 49 - 181 mcg/dL    TIBC 352 261 - 462 mcg/dL    Iron Saturation 9 (L) 20 - 55 %   Vitamin D 25 Hydroxy   Result Value Ref Range    25 Hydroxy, Vitamin D 14.5 (L) 30.0 - 100.0 ng/ml   T3, Free   Result Value Ref Range    T3, Free 2.7 2.0 - 4.4 pg/mL   TSH   Result Value Ref Range    TSH 2.260 0.460 - 4.680 mIU/mL   T4, Free   Result Value Ref Range    Free T4 1.04 0.78 - 2.19 ng/dL   Hemoglobin A1c   Result Value Ref Range    Hemoglobin A1C 5.6 4 - 5.6 %   Ferritin   Result Value Ref Range    Ferritin 15.80 (L) 17.90 - 464.00 ng/mL   Lipid Panel   Result Value Ref Range    Total Cholesterol 168 0 - 199 mg/dL    Triglycerides 95 20 - 199 mg/dL    HDL Cholesterol 54 (L) 60 - 200 mg/dL    LDL Cholesterol  101 1 - 129 mg/dL    LDL/HDL Ratio 1.76 0.00 - 3.55   Comprehensive Metabolic Panel   Result Value Ref Range    Glucose 93 60 - 100 mg/dL    BUN 15 7 - 21 mg/dL    Creatinine 1.11 0.70 - 1.30 mg/dL    Sodium 140 137 - 145 mmol/L    Potassium 3.5 3.5 - 5.1 mmol/L    Chloride 100 95 - 110 mmol/L    CO2 28.0 22.0 - 31.0 mmol/L    Calcium 9.9 8.4 - 10.2 mg/dL    Total Protein 6.9 6.3 - 8.6 g/dL    Albumin 3.80 3.40 - 4.80 g/dL    ALT (SGPT) 22 21 - 72 U/L    AST (SGOT) 17 17 - 59 U/L    Alkaline Phosphatase 87 38 - 126 U/L    Total Bilirubin 0.3 0.2 - 1.3 mg/dL    eGFR   Amer 80 49 - 113 mL/min/1.73    Globulin 3.1 2.3 - 3.5 gm/dL    A/G Ratio 1.2 1.1 - 1.8 g/dL    BUN/Creatinine Ratio 13.5 7.0 - 25.0    Anion Gap 12.0 5.0 - 15.0 mmol/L   Results for orders placed or performed during the hospital encounter of 10/19/17    Tissue Pathology Exam - Tissue, Large Intestine   Result Value Ref Range    Case Report       Surgical Pathology Report                         Case: [id number]                                  Authorizing Provider:  Patric Wooten MD         Collected:           10/19/2017 03:54 PM          Ordering Location:     [facility]             Received:            10/20/2017 08:32 AM                                 [facility]                                                     Pathologist:           Oziel Britt MD                                                            Specimens:   1) - Large Intestine, colonic mucousa                                                               2) - Large Intestine, Rectum, cold bx tissue                                               Final Diagnosis       1.  COLONIC MUCOSA, RANDOM BIOPSY:  NO SIGNIFICANT HISTOLOGIC ABNORMALITY.    2.  RECTUM, BIOPSY:  FOCAL ARCHITECTURAL DISTORTION, POSSIBLY SOLITARY RECTAL ULCER SYNDROME.      Gross Description       1.  Two tan fragments measure 0.7 x 0.4 x 0.2 cm together.  Totally submitted as 1A.     2.  Two tan fragments measure 0.6 x 0.6 x 0.2 cm together.  Totally submitted as 2A.       Embedded Images     Results for orders placed or performed in visit on 07/29/17   Peripheral Blood Smear   Result Value Ref Range    Performed by:      Pathologist Interpretation       Microcytic- hypochromic anemia most likely Iron deficiency state. Suggest clinical correlation.   Occult Blood X 1, Stool   Result Value Ref Range    Fecal Occult Blood Positive (A) Negative   Bilirubin, Total & Direct   Result Value Ref Range    Total Bilirubin 0.2 0.2 - 1.3 mg/dL    Bilirubin, Direct 0.0 0.0 - 0.3 mg/dL    Bilirubin, Indirect 0.2 0.0 - 1.1 mg/dL   Lactate Dehydrogenase   Result Value Ref Range     313 - 618 U/L   Haptoglobin   Result Value Ref Range    Haptoglobin 156 34 - 200 mg/dL   Results for orders placed or performed in  visit on 07/24/17   CBC Auto Differential   Result Value Ref Range    WBC 3.62 3.20 - 9.80 10*3/mm3    RBC 4.00 (L) 4.37 - 5.74 10*6/mm3    Hemoglobin 11.9 (L) 13.7 - 17.3 g/dL    Hematocrit 37.0 (L) 39.0 - 49.0 %    MCV 92.5 80.0 - 98.0 fL    MCH 29.8 26.5 - 34.0 pg    MCHC 32.2 31.5 - 36.3 g/dL    RDW 14.4 11.5 - 14.5 %    RDW-SD 48.6 (H) 35.1 - 43.9 fl    MPV 9.7 8.0 - 12.0 fL    Platelets 264 150 - 450 10*3/mm3    Neutrophil % 64.2 37.0 - 80.0 %    Lymphocyte % 20.2 10.0 - 50.0 %    Monocyte % 12.2 (H) 0.0 - 12.0 %    Eosinophil % 2.5 0.0 - 7.0 %    Basophil % 0.6 0.0 - 2.0 %    Immature Grans % 0.3 0.0 - 0.5 %    Neutrophils, Absolute 2.33 2.00 - 8.60 10*3/mm3    Lymphocytes, Absolute 0.73 0.60 - 4.20 10*3/mm3    Monocytes, Absolute 0.44 0.00 - 0.90 10*3/mm3    Eosinophils, Absolute 0.09 0.00 - 0.70 10*3/mm3    Basophils, Absolute 0.02 0.00 - 0.20 10*3/mm3    Immature Grans, Absolute 0.01 0.00 - 0.02 10*3/mm3   Iron Profile   Result Value Ref Range    Iron 29 (L) 49 - 181 mcg/dL    TIBC 342 261 - 462 mcg/dL    Iron Saturation 8 (L) 20 - 55 %   Reticulocytes   Result Value Ref Range    Reticulocyte % 1.37 0.64 - 2.26 %    Reticulocyte Absolute 0.0548 0.0250 - 0.1250 10*6/mm3    Immature Reticulocyte Fraction 16.9 (H) 3.0 - 15.9 %    Reticulocyte Production Index 0.80 0.64 - 2.26 %    Hematocrit 37.0 (L) 39.0 - 49.0 %    Reticulocyte Hgb 34.8 30.0 - 38.0 pg     *Note: Due to a large number of results and/or encounters for the requested time period, some results have not been displayed. A complete set of results can be found in Results Review.

## 2018-02-14 ENCOUNTER — OFFICE VISIT (OUTPATIENT)
Dept: FAMILY MEDICINE CLINIC | Facility: CLINIC | Age: 68
End: 2018-02-14

## 2018-02-14 VITALS
RESPIRATION RATE: 16 BRPM | DIASTOLIC BLOOD PRESSURE: 74 MMHG | BODY MASS INDEX: 29.87 KG/M2 | WEIGHT: 225.4 LBS | SYSTOLIC BLOOD PRESSURE: 136 MMHG | TEMPERATURE: 98.6 F | HEIGHT: 73 IN | HEART RATE: 79 BPM

## 2018-02-14 DIAGNOSIS — D50.9 IRON DEFICIENCY ANEMIA, UNSPECIFIED IRON DEFICIENCY ANEMIA TYPE: ICD-10-CM

## 2018-02-14 DIAGNOSIS — G47.9 SLEEP DISORDER: Primary | ICD-10-CM

## 2018-02-14 DIAGNOSIS — E78.5 HYPERLIPIDEMIA, UNSPECIFIED HYPERLIPIDEMIA TYPE: ICD-10-CM

## 2018-02-14 DIAGNOSIS — R53.83 OTHER FATIGUE: ICD-10-CM

## 2018-02-14 DIAGNOSIS — Z00.00 GENERAL MEDICAL EXAMINATION: ICD-10-CM

## 2018-02-14 DIAGNOSIS — E55.9 VITAMIN D DEFICIENCY: ICD-10-CM

## 2018-02-14 PROCEDURE — 99214 OFFICE O/P EST MOD 30 MIN: CPT | Performed by: FAMILY MEDICINE

## 2018-02-14 PROCEDURE — 36415 COLL VENOUS BLD VENIPUNCTURE: CPT | Performed by: FAMILY MEDICINE

## 2018-02-14 NOTE — PROGRESS NOTES
Subjective   German Ray is a 67 y.o. male.     History of Present Illness       Problem List  1. Essential Hypertension  2. Hyperlipidemia ASCVD risk high   3. Depression  4. Hypokalemia  5. Bereavement  6. Iron deficiency anemia  7. Vitamin D deficiency  8. H/O prostate cancer sp radiation therapy and seed therapy - sees Oncologist every 6 months.  9. Diverticulosis  10. H/O Lower GI bleeding   11. Radiation proctitis/Rectal Bleeding/Rectal ulcer   12. Hemorrhoids  13. Lipoma of upper back  14. BPH  15. Erectile Dysfunction  16. Internal/External Hemorrhoids  17. Diverticulosis of sigmoid colon     Pt is is 65 yo AAM with the above medical issues. Is here for recheck on blood pressure  Pt has history of iron deficiency anemia and is taking iron pill 325 mg PO TID.  His last hemoglobin was 11.9 .  Also has history of hypertension and is on Cardura and HCTZ 25 and micardidis-HCTZ mg PO q daily.  For hyperlipidemia pt is taking pravastatin. For depression pt is taking celexa. Stable today.  BP slightly  Elevated today but at home runs <140/90. Denies any chest pain, headaches or dizziness. Last lipid panel was normal . States he is in good spirits.  He is better emotionally since his wife passed away last year.      Pt did see Gastroenterology for blood in stool and rectal bleeding a colonoscopy and subsequent biopsy for erythema  was done that showed a rectal ulcer. Pt was started on mesalamine 1000 mg rectal qhs.  He also has diverticulosis and internal/external hemorrhoids and fiber supplemetns were recommended. Pt denies any active bleeding and is tolerating suppository medication. He still has occasional weakness and fatigue but no dizziness or syncopal episodes. He is due for recheck on lipid panel,  Thyroid studies and hga1c.  Pt also needs refill on potassium pill BID      2/14/18 - pt is here for recheck. He is due for recheck on thyroid studies, Vitamin D levels and lipid panel. Pt also has iron  "deficiency anemia. He had colonoscopy last year that showed diverticulosis and internal/external hemorrhoids. He continues to take mesalamine 1000 mg supository. Pt states he is doing well although he does feel fatigued and tired during the day. He averages about 4 hours a night.   He still has episodes of grieving with loss of wife. Pt denies lonliness.. Pt also had mole on back that come off. He was referred to Dermatologist but did not make appt. Currently it is not bothering him.          The following portions of the patient's history were reviewed and updated as appropriate: allergies, current medications, past family history, past medical history, past social history, past surgical history and problem list.    Review of Systems   Constitutional: Positive for activity change.   HENT: Negative.    Eyes: Negative.    Respiratory: Negative.    Cardiovascular: Negative.    Gastrointestinal: Negative.    Endocrine: Negative.    Genitourinary: Negative.    Musculoskeletal: Negative.    Skin: Negative.    Allergic/Immunologic: Negative.    Neurological: Negative.    Hematological: Negative.    Psychiatric/Behavioral: Positive for sleep disturbance.       Objective    /74  Pulse 79  Temp 98.6 °F (37 °C)  Resp 16  Ht 185.4 cm (73\")  Wt 102 kg (225 lb 6.4 oz)  BMI 29.74 kg/m2          Chemistry        Component Value Date/Time     11/16/2017 1101    K 3.5 11/16/2017 1101     11/16/2017 1101    CO2 28.0 11/16/2017 1101    BUN 15 11/16/2017 1101    CREATININE 1.11 11/16/2017 1101        Component Value Date/Time    CALCIUM 9.9 11/16/2017 1101    ALKPHOS 87 11/16/2017 1101    AST 17 11/16/2017 1101    ALT 22 11/16/2017 1101    BILITOT 0.3 11/16/2017 1101        Lab Results   Component Value Date    WBC 4.29 11/16/2017    HGB 11.9 (L) 11/16/2017    HCT 37.5 (L) 11/16/2017    MCV 91.7 11/16/2017     11/16/2017     .lastipid  Lab Results   Component Value Date    HGBA1C 5.6 11/16/2017     Lab " Results   Component Value Date    TSH 2.260 11/16/2017     Office Visit on 11/15/2017   Component Date Value Ref Range Status   • Glucose 11/16/2017 93  60 - 100 mg/dL Final   • BUN 11/16/2017 15  7 - 21 mg/dL Final   • Creatinine 11/16/2017 1.11  0.70 - 1.30 mg/dL Final   • Sodium 11/16/2017 140  137 - 145 mmol/L Final   • Potassium 11/16/2017 3.5  3.5 - 5.1 mmol/L Final   • Chloride 11/16/2017 100  95 - 110 mmol/L Final   • CO2 11/16/2017 28.0  22.0 - 31.0 mmol/L Final   • Calcium 11/16/2017 9.9  8.4 - 10.2 mg/dL Final   • Total Protein 11/16/2017 6.9  6.3 - 8.6 g/dL Final   • Albumin 11/16/2017 3.80  3.40 - 4.80 g/dL Final   • ALT (SGPT) 11/16/2017 22  21 - 72 U/L Final   • AST (SGOT) 11/16/2017 17  17 - 59 U/L Final   • Alkaline Phosphatase 11/16/2017 87  38 - 126 U/L Final   • Total Bilirubin 11/16/2017 0.3  0.2 - 1.3 mg/dL Final   • eGFR   Amer 11/16/2017 80  49 - 113 mL/min/1.73 Final   • Globulin 11/16/2017 3.1  2.3 - 3.5 gm/dL Final   • A/G Ratio 11/16/2017 1.2  1.1 - 1.8 g/dL Final   • BUN/Creatinine Ratio 11/16/2017 13.5  7.0 - 25.0 Final   • Anion Gap 11/16/2017 12.0  5.0 - 15.0 mmol/L Final   • WBC 11/16/2017 4.29  3.20 - 9.80 10*3/mm3 Final   • RBC 11/16/2017 4.09* 4.37 - 5.74 10*6/mm3 Final   • Hemoglobin 11/16/2017 11.9* 13.7 - 17.3 g/dL Final   • Hematocrit 11/16/2017 37.5* 39.0 - 49.0 % Final   • MCV 11/16/2017 91.7  80.0 - 98.0 fL Final   • MCH 11/16/2017 29.1  26.5 - 34.0 pg Final   • MCHC 11/16/2017 31.7  31.5 - 36.3 g/dL Final   • RDW 11/16/2017 14.0  11.5 - 14.5 % Final   • RDW-SD 11/16/2017 47.3* 35.1 - 43.9 fl Final   • MPV 11/16/2017 9.7  8.0 - 12.0 fL Final   • Platelets 11/16/2017 309  150 - 450 10*3/mm3 Final   • Neutrophil % 11/16/2017 62.0  37.0 - 80.0 % Final   • Lymphocyte % 11/16/2017 22.6  10.0 - 50.0 % Final   • Monocyte % 11/16/2017 10.5  0.0 - 12.0 % Final   • Eosinophil % 11/16/2017 4.0  0.0 - 7.0 % Final   • Basophil % 11/16/2017 0.7  0.0 - 2.0 % Final   • Immature  Grans % 11/16/2017 0.2  0.0 - 0.5 % Final   • Neutrophils, Absolute 11/16/2017 2.66  2.00 - 8.60 10*3/mm3 Final   • Lymphocytes, Absolute 11/16/2017 0.97  0.60 - 4.20 10*3/mm3 Final   • Monocytes, Absolute 11/16/2017 0.45  0.00 - 0.90 10*3/mm3 Final   • Eosinophils, Absolute 11/16/2017 0.17  0.00 - 0.70 10*3/mm3 Final   • Basophils, Absolute 11/16/2017 0.03  0.00 - 0.20 10*3/mm3 Final   • Immature Grans, Absolute 11/16/2017 0.01  0.00 - 0.02 10*3/mm3 Final   • Ferritin 11/16/2017 15.80* 17.90 - 464.00 ng/mL Final   • Iron 11/16/2017 30* 49 - 181 mcg/dL Final   • TIBC 11/16/2017 352  261 - 462 mcg/dL Final   • Iron Saturation 11/16/2017 9* 20 - 55 % Final   • Total Cholesterol 11/16/2017 168  0 - 199 mg/dL Final   • Triglycerides 11/16/2017 95  20 - 199 mg/dL Final   • HDL Cholesterol 11/16/2017 54* 60 - 200 mg/dL Final   • LDL Cholesterol  11/16/2017 101  1 - 129 mg/dL Final   • LDL/HDL Ratio 11/16/2017 1.76  0.00 - 3.55 Final   • Hemoglobin A1C 11/16/2017 5.6  4 - 5.6 % Final   • TSH 11/16/2017 2.260  0.460 - 4.680 mIU/mL Final   • Free T4 11/16/2017 1.04  0.78 - 2.19 ng/dL Final   • T3, Free 11/16/2017 2.7  2.0 - 4.4 pg/mL Final   • 25 Hydroxy, Vitamin D 11/16/2017 14.5* 30.0 - 100.0 ng/ml Final       Physical Exam   Constitutional: He is oriented to person, place, and time. He appears well-developed and well-nourished. No distress.   HENT:   Head: Normocephalic and atraumatic.   Right Ear: External ear normal.   Left Ear: External ear normal.   Eyes: Conjunctivae and EOM are normal. Pupils are equal, round, and reactive to light. Right eye exhibits no discharge. Left eye exhibits no discharge. No scleral icterus.   Neck: Normal range of motion. Neck supple. No JVD present. No tracheal deviation present. No thyromegaly present.   Cardiovascular: Normal rate, regular rhythm and normal heart sounds.    Pulmonary/Chest: Effort normal. No stridor.   Abdominal: Bowel sounds are normal. He exhibits no distension and no  mass. There is no tenderness. There is no rebound and no guarding. No hernia.   Musculoskeletal: Normal range of motion. He exhibits no edema, tenderness or deformity.   Lymphadenopathy:     He has no cervical adenopathy.   Neurological: He is alert and oriented to person, place, and time. He has normal reflexes. No cranial nerve deficit. Coordination normal.   Skin: Skin is warm and dry. No rash noted. He is not diaphoretic. No erythema. No pallor.   Mole on lower back has stuck on appearance.  No bleeding.  Possible sebborrheic keratosis    Psychiatric: He has a normal mood and affect. His behavior is normal.   Nursing note and vitals reviewed.      Assessment/Plan   Problems Addressed this Visit        Cardiovascular and Mediastinum    Hyperlipidemia    Relevant Orders    CBC Auto Differential    Comprehensive Metabolic Panel    Lipid Panel    TSH    T4, Free    T3, Free    Ferritin    Iron Profile    Vitamin D 25 Hydroxy       Digestive    Vitamin D deficiency  - Primary    Relevant Orders    Vitamin D 25 Hydroxy       Hematopoietic and Hemostatic    Iron deficiency anemia    Relevant Orders    CBC Auto Differential    Comprehensive Metabolic Panel    Lipid Panel    TSH    T4, Free    T3, Free    Ferritin    Iron Profile    Vitamin D 25 Hydroxy       Other    General medical examination    Relevant Orders    CBC Auto Differential    Comprehensive Metabolic Panel    Lipid Panel    TSH    T4, Free    T3, Free    Ferritin    Iron Profile    Vitamin D 25 Hydroxy    Sleep disorder    Other fatigue        -fatigue/sleep disorder -  Will refer to Dr. Bella for sleep study. Consultation appreciated  - Vitamin D deficiency - continue vitamin D pill  - Essential hypertension - increased HCTZ from  50 mg PO q daily.  Continue micardis 80 mg daily , cardura. BP at goal today   - hyperlipidemia - check lipid panel.  Continue pravastatin  -rectal ulcer/diverticulosis - continue mesalamine suppository 1000 mg rectal at  bedtime.  Recommend high fiber diet   -depression - stable on celexa. Pt declines counseling. He has a female  in [place], [place].     -Anemia - recheck CBC. Iron deficiency anemia. Continue iron pill TID  - For mole on back.  Has stuck on appearance. Consider cryotherapy or shave biopsy in the future. Refferral to Dr. Neves for further evaluation was made but mole came off before appt.

## 2018-02-14 NOTE — PATIENT INSTRUCTIONS
Calorie Counting for Weight Loss  Calories are units of energy. Your body needs a certain amount of calories from food to keep you going throughout the day. When you eat more calories than your body needs, your body stores the extra calories as fat. When you eat fewer calories than your body needs, your body burns fat to get the energy it needs.  Calorie counting means keeping track of how many calories you eat and drink each day. Calorie counting can be helpful if you need to lose weight. If you make sure to eat fewer calories than your body needs, you should lose weight. Ask your health care provider what a healthy weight is for you.  For calorie counting to work, you will need to eat the right number of calories in a day in order to lose a healthy amount of weight per week. A dietitian can help you determine how many calories you need in a day and will give you suggestions on how to reach your calorie goal.  · A healthy amount of weight to lose per week is usually 1-2 lb (0.5-0.9 kg). This usually means that your daily calorie intake should be reduced by 500-750 calories.  · Eating 1,200 - 1,500 calories per day can help most women lose weight.  · Eating 1,500 - 1,800 calories per day can help most men lose weight.  What is my plan?  My goal is to have __________ calories per day.  If I have this many calories per day, I should lose around __________ pounds per week.  What do I need to know about calorie counting?  In order to meet your daily calorie goal, you will need to:  · Find out how many calories are in each food you would like to eat. Try to do this before you eat.  · Decide how much of the food you plan to eat.  · Write down what you ate and how many calories it had. Doing this is called keeping a food log.  To successfully lose weight, it is important to balance calorie counting with a healthy lifestyle that includes regular activity. Aim for 150 minutes of moderate exercise (such as walking) or 75  minutes of vigorous exercise (such as running) each week.  Where do I find calorie information?     The number of calories in a food can be found on a Nutrition Facts label. If a food does not have a Nutrition Facts label, try to look up the calories online or ask your dietitian for help.  Remember that calories are listed per serving. If you choose to have more than one serving of a food, you will have to multiply the calories per serving by the amount of servings you plan to eat. For example, the label on a package of bread might say that a serving size is 1 slice and that there are 90 calories in a serving. If you eat 1 slice, you will have eaten 90 calories. If you eat 2 slices, you will have eaten 180 calories.  How do I keep a food log?  Immediately after each meal, record the following information in your food log:  · What you ate. Don't forget to include toppings, sauces, and other extras on the food.  · How much you ate. This can be measured in cups, ounces, or number of items.  · How many calories each food and drink had.  · The total number of calories in the meal.  Keep your food log near you, such as in a small notebook in your pocket, or use a mobile page or website. Some programs will calculate calories for you and show you how many calories you have left for the day to meet your goal.  What are some calorie counting tips?  · Use your calories on foods and drinks that will fill you up and not leave you hungry:  ¨ Some examples of foods that fill you up are nuts and nut butters, vegetables, lean proteins, and high-fiber foods like whole grains. High-fiber foods are foods with more than 5 g fiber per serving.  ¨ Drinks such as sodas, specialty coffee drinks, alcohol, and juices have a lot of calories, yet do not fill you up.  · Eat nutritious foods and avoid empty calories. Empty calories are calories you get from foods or beverages that do not have many vitamins or protein, such as candy, sweets, and  "soda. It is better to have a nutritious high-calorie food (such as an avocado) than a food with few nutrients (such as a bag of chips).  · Know how many calories are in the foods you eat most often. This will help you calculate calorie counts faster.  · Pay attention to calories in drinks. Low-calorie drinks include water and unsweetened drinks.  · Pay attention to nutrition labels for \"low fat\" or \"fat free\" foods. These foods sometimes have the same amount of calories or more calories than the full fat versions. They also often have added sugar, starch, or salt, to make up for flavor that was removed with the fat.  · Find a way of tracking calories that works for you. Get creative. Try different apps or programs if writing down calories does not work for you.  What are some portion control tips?  · Know how many calories are in a serving. This will help you know how many servings of a certain food you can have.  · Use a measuring cup to measure serving sizes. You could also try weighing out portions on a kitchen scale. With time, you will be able to estimate serving sizes for some foods.  · Take some time to put servings of different foods on your favorite plates, bowls, and cups so you know what a serving looks like.  · Try not to eat straight from a bag or box. Doing this can lead to overeating. Put the amount you would like to eat in a cup or on a plate to make sure you are eating the right portion.  · Use smaller plates, glasses, and bowls to prevent overeating.  · Try not to multitask (for example, watch TV or use your computer) while eating. If it is time to eat, sit down at a table and enjoy your food. This will help you to know when you are full. It will also help you to be aware of what you are eating and how much you are eating.  What are tips for following this plan?  Reading food labels   · Check the calorie count compared to the serving size. The serving size may be smaller than what you are used to " eating.  · Check the source of the calories. Make sure the food you are eating is high in vitamins and protein and low in saturated and trans fats.  Shopping   · Read nutrition labels while you shop. This will help you make healthy decisions before you decide to purchase your food.  · Make a grocery list and stick to it.  Cooking   · Try to cook your favorite foods in a healthier way. For example, try baking instead of frying.  · Use low-fat dairy products.  Meal planning   · Use more fruits and vegetables. Half of your plate should be fruits and vegetables.  · Include lean proteins like poultry and fish.  How do I count calories when eating out?  · Ask for smaller portion sizes.  · Consider sharing an entree and sides instead of getting your own entree.  · If you get your own entree, eat only half. Ask for a box at the beginning of your meal and put the rest of your entree in it so you are not tempted to eat it.  · If calories are listed on the menu, choose the lower calorie options.  · Choose dishes that include vegetables, fruits, whole grains, low-fat dairy products, and lean protein.  · Choose items that are boiled, broiled, grilled, or steamed. Stay away from items that are buttered, battered, fried, or served with cream sauce. Items labeled “crispy” are usually fried, unless stated otherwise.  · Choose water, low-fat milk, unsweetened iced tea, or other drinks without added sugar. If you want an alcoholic beverage, choose a lower calorie option such as a glass of wine or light beer.  · Ask for dressings, sauces, and syrups on the side. These are usually high in calories, so you should limit the amount you eat.  · If you want a salad, choose a garden salad and ask for grilled meats. Avoid extra toppings like kenney, cheese, or fried items. Ask for the dressing on the side, or ask for olive oil and vinegar or lemon to use as dressing.  · Estimate how many servings of a food you are given. For example, a serving  of cooked rice is ½ cup or about the size of half a baseball. Knowing serving sizes will help you be aware of how much food you are eating at restaurants. The list below tells you how big or small some common portion sizes are based on everyday objects:  ¨ 1 oz--4 stacked dice.  ¨ 3 oz--1 deck of cards.  ¨ 1 tsp--1 die.  ¨ 1 Tbsp--½ a ping-pong ball.  ¨ 2 Tbsp--1 ping-pong ball.  ¨ ½ cup--½ baseball.  ¨ 1 cup--1 baseball.  Summary  · Calorie counting means keeping track of how many calories you eat and drink each day. If you eat fewer calories than your body needs, you should lose weight.  · A healthy amount of weight to lose per week is usually 1-2 lb (0.5-0.9 kg). This usually means reducing your daily calorie intake by 500-750 calories.  · The number of calories in a food can be found on a Nutrition Facts label. If a food does not have a Nutrition Facts label, try to look up the calories online or ask your dietitian for help.  · Use your calories on foods and drinks that will fill you up, and not on foods and drinks that will leave you hungry.  · Use smaller plates, glasses, and bowls to prevent overeating.  This information is not intended to replace advice given to you by your health care provider. Make sure you discuss any questions you have with your health care provider.  Document Released: 12/18/2006 Document Revised: 11/17/2017 Document Reviewed: 11/17/2017  [employer] Interactive Patient Education © 2017 [employer]    Exercising to Lose Weight  Exercising can help you to lose weight. In order to lose weight through exercise, you need to do vigorous-intensity exercise. You can tell that you are exercising with vigorous intensity if you are breathing very hard and fast and cannot hold a conversation while exercising.  Moderate-intensity exercise helps to maintain your current weight. You can tell that you are exercising at a moderate level if you have a higher heart rate and faster breathing, but you are  still able to hold a conversation.  How often should I exercise?  Choose an activity that you enjoy and set realistic goals. Your health care provider can help you to make an activity plan that works for you. Exercise regularly as directed by your health care provider. This may include:  · Doing resistance training twice each week, such as:  ¨ Push-ups.  ¨ Sit-ups.  ¨ Lifting weights.  ¨ Using resistance bands.  · Doing a given intensity of exercise for a given amount of time. Choose from these options:  ¨ 150 minutes of moderate-intensity exercise every week.  ¨ 75 minutes of vigorous-intensity exercise every week.  ¨ A mix of moderate-intensity and vigorous-intensity exercise every week.  Children, pregnant women, people who are out of shape, people who are overweight, and older adults may need to consult a health care provider for individual recommendations. If you have any sort of medical condition, be sure to consult your health care provider before starting a new exercise program.  What are some activities that can help me to lose weight?  · Walking at a rate of at least 4.5 miles an hour.  · Jogging or running at a rate of 5 miles per hour.  · Biking at a rate of at least 10 miles per hour.  · Lap swimming.  · Roller-skating or in-line skating.  · Cross-country skiing.  · Vigorous competitive sports, such as football, basketball, and soccer.  · Jumping rope.  · Aerobic dancing.  How can I be more active in my day-to-day activities?  · Use the stairs instead of the elevator.  · Take a walk during your lunch break.  · If you drive, park your car farther away from work or school.  · If you take public transportation, get off one stop early and walk the rest of the way.  · Make all of your phone calls while standing up and walking around.  · Get up, stretch, and walk around every 30 minutes throughout the day.  What guidelines should I follow while exercising?  · Do not exercise so much that you hurt yourself,  "feel dizzy, or get very short of breath.  · Consult your health care provider prior to starting a new exercise program.  · Wear comfortable clothes and shoes with good support.  · Drink plenty of water while you exercise to prevent dehydration or heat stroke. Body water is lost during exercise and must be replaced.  · Work out until you breathe faster and your heart beats faster.  This information is not intended to replace advice given to you by your health care provider. Make sure you discuss any questions you have with your health care provider.  Document Released: 01/20/2012 Document Revised: 05/25/2017 Document Reviewed: 05/21/2015  [employer] Interactive Patient Education © 2017 [employer]    DASH Eating Plan  DASH stands for \"Dietary Approaches to Stop Hypertension.\" The DASH eating plan is a healthy eating plan that has been shown to reduce high blood pressure (hypertension). It may also reduce your risk for type 2 diabetes, heart disease, and stroke. The DASH eating plan may also help with weight loss.  What are tips for following this plan?  General guidelines   · Avoid eating more than 2,300 mg (milligrams) of salt (sodium) a day. If you have hypertension, you may need to reduce your sodium intake to 1,500 mg a day.  · Limit alcohol intake to no more than 1 drink a day for nonpregnant women and 2 drinks a day for men. One drink equals 12 oz of beer, 5 oz of wine, or 1½ oz of hard liquor.  · Work with your health care provider to maintain a healthy body weight or to lose weight. Ask what an ideal weight is for you.  · Get at least 30 minutes of exercise that causes your heart to beat faster (aerobic exercise) most days of the week. Activities may include walking, swimming, or biking.  · Work with your health care provider or diet and nutrition specialist (dietitian) to adjust your eating plan to your individual calorie needs.  Reading food labels   · Check food labels for the amount of sodium per serving. " "Choose foods with less than 5 percent of the Daily Value of sodium. Generally, foods with less than 300 mg of sodium per serving fit into this eating plan.  · To find whole grains, look for the word \"whole\" as the first word in the ingredient list.  Shopping   · Buy products labeled as \"low-sodium\" or \"no salt added.\"  · Buy fresh foods. Avoid canned foods and premade or frozen meals.  Cooking   · Avoid adding salt when cooking. Use salt-free seasonings or herbs instead of table salt or sea salt. Check with your health care provider or pharmacist before using salt substitutes.  · Do not borges foods. Cook foods using healthy methods such as baking, boiling, grilling, and broiling instead.  · Cook with heart-healthy oils, such as olive, canola, soybean, or sunflower oil.  Meal planning     · Eat a balanced diet that includes:  ¨ 5 or more servings of fruits and vegetables each day. At each meal, try to fill half of your plate with fruits and vegetables.  ¨ Up to 6-8 servings of whole grains each day.  ¨ Less than 6 oz of lean meat, poultry, or fish each day. A 3-oz serving of meat is about the same size as a deck of cards. One egg equals 1 oz.  ¨ 2 servings of low-fat dairy each day.  ¨ A serving of nuts, seeds, or beans 5 times each week.  ¨ Heart-healthy fats. Healthy fats called Omega-3 fatty acids are found in foods such as flaxseeds and coldwater fish, like sardines, salmon, and mackerel.  · Limit how much you eat of the following:  ¨ Canned or prepackaged foods.  ¨ Food that is high in trans fat, such as fried foods.  ¨ Food that is high in saturated fat, such as fatty meat.  ¨ Sweets, desserts, sugary drinks, and other foods with added sugar.  ¨ Full-fat dairy products.  · Do not salt foods before eating.  · Try to eat at least 2 vegetarian meals each week.  · Eat more home-cooked food and less restaurant, buffet, and fast food.  · When eating at a restaurant, ask that your food be prepared with less salt or no " salt, if possible.  What foods are recommended?  The items listed may not be a complete list. Talk with your dietitian about what dietary choices are best for you.  Grains   Whole-grain or whole-wheat bread. Whole-grain or whole-wheat pasta. Brown rice. Oatmeal. Quinoa. Bulgur. Whole-grain and low-sodium cereals. Kira bread. Low-fat, low-sodium crackers. Whole-wheat flour tortillas.  Vegetables   Fresh or frozen vegetables (raw, steamed, roasted, or grilled). Low-sodium or reduced-sodium tomato and vegetable juice. Low-sodium or reduced-sodium tomato sauce and tomato paste. Low-sodium or reduced-sodium canned vegetables.  Fruits   All fresh, dried, or frozen fruit. Canned fruit in natural juice (without added sugar).  Meat and other protein foods   Skinless chicken or turkey. Ground chicken or turkey. Pork with fat trimmed off. Fish and seafood. Egg whites. Dried beans, peas, or lentils. Unsalted nuts, nut butters, and seeds. Unsalted canned beans. Lean cuts of beef with fat trimmed off. Low-sodium, lean deli meat.  Dairy   Low-fat (1%) or fat-free (skim) milk. Fat-free, low-fat, or reduced-fat cheeses. Nonfat, low-sodium ricotta or cottage cheese. Low-fat or nonfat yogurt. Low-fat, low-sodium cheese.  Fats and oils   Soft margarine without trans fats. Vegetable oil. Low-fat, reduced-fat, or light mayonnaise and salad dressings (reduced-sodium). Canola, safflower, olive, soybean, and sunflower oils. Avocado.  Seasoning and other foods   Herbs. Spices. Seasoning mixes without salt. Unsalted popcorn and pretzels. Fat-free sweets.  What foods are not recommended?  The items listed may not be a complete list. Talk with your dietitian about what dietary choices are best for you.  Grains   Baked goods made with fat, such as croissants, muffins, or some breads. Dry pasta or rice meal packs.  Vegetables   Creamed or fried vegetables. Vegetables in a cheese sauce. Regular canned vegetables (not low-sodium or reduced-sodium).  Regular canned tomato sauce and paste (not low-sodium or reduced-sodium). Regular tomato and vegetable juice (not low-sodium or reduced-sodium). Pickles. Olives.  Fruits   Canned fruit in a light or heavy syrup. Fried fruit. Fruit in cream or butter sauce.  Meat and other protein foods   Fatty cuts of meat. Ribs. Fried meat. [place]. Sausage. Bologna and other processed lunch meats. Salami. Fatback. Hotdogs. Bratwurst. Salted nuts and seeds. Canned beans with added salt. Canned or smoked fish. Whole eggs or egg yolks. Chicken or turkey with skin.  Dairy   Whole or 2% milk, cream, and half-and-half. Whole or full-fat cream cheese. Whole-fat or sweetened yogurt. Full-fat cheese. Nondairy creamers. Whipped toppings. Processed cheese and cheese spreads.  Fats and oils   Butter. Stick margarine. Lard. Shortening. Ghee. [place] fat. Tropical oils, such as coconut, palm kernel, or palm oil.  Seasoning and other foods   Salted popcorn and pretzels. Onion salt, garlic salt, seasoned salt, table salt, and sea salt. Worcestershire sauce. Tartar sauce. Barbecue sauce. Teriyaki sauce. Soy sauce, including reduced-sodium. Steak sauce. Canned and packaged gravies. Fish sauce. Oyster sauce. Cocktail sauce. Horseradish that you find on the shelf. Ketchup. Mustard. Meat flavorings and tenderizers. Bouillon cubes. Hot sauce and Tabasco sauce. Premade or packaged marinades. Premade or packaged taco seasonings. Relishes. Regular salad dressings.  Where to find more information:  · National Heart, [employer], and [employer] [place]: www.nhlbi.nih.gov  · American Heart Association: www.heart.org  Summary  · The DASH eating plan is a healthy eating plan that has been shown to reduce high blood pressure (hypertension). It may also reduce your risk for type 2 diabetes, heart disease, and stroke.  · With the DASH eating plan, you should limit salt (sodium) intake to 2,300 mg a day. If you have hypertension, you may need to reduce your sodium intake to  1,500 mg a day.  · When on the DASH eating plan, aim to eat more fresh fruits and vegetables, whole grains, lean proteins, low-fat dairy, and heart-healthy fats.  · Work with your health care provider or diet and nutrition specialist (dietitian) to adjust your eating plan to your individual calorie needs.  This information is not intended to replace advice given to you by your health care provider. Make sure you discuss any questions you have with your health care provider.  Document Released: 12/06/2012 Document Revised: 12/11/2017 Document Reviewed: 12/11/2017  [employer] Interactive Patient Education © 2017 [employer]

## 2018-02-15 ENCOUNTER — LAB (OUTPATIENT)
Dept: LAB | Facility: CLINIC | Age: 68
End: 2018-02-15

## 2018-02-15 DIAGNOSIS — E78.5 HYPERLIPIDEMIA, UNSPECIFIED HYPERLIPIDEMIA TYPE: ICD-10-CM

## 2018-02-15 DIAGNOSIS — D50.9 IRON DEFICIENCY ANEMIA, UNSPECIFIED IRON DEFICIENCY ANEMIA TYPE: ICD-10-CM

## 2018-02-15 DIAGNOSIS — Z00.00 GENERAL MEDICAL EXAMINATION: ICD-10-CM

## 2018-02-15 DIAGNOSIS — E55.9 VITAMIN D DEFICIENCY: ICD-10-CM

## 2018-02-15 LAB
25(OH)D3 SERPL-MCNC: 47 NG/ML (ref 30–100)
ALBUMIN SERPL-MCNC: 3.6 G/DL (ref 3.4–4.8)
ALBUMIN/GLOB SERPL: 1.1 G/DL (ref 1.1–1.8)
ALP SERPL-CCNC: 90 U/L (ref 38–126)
ALT SERPL W P-5'-P-CCNC: 30 U/L (ref 21–72)
ANION GAP SERPL CALCULATED.3IONS-SCNC: 12 MMOL/L (ref 5–15)
ARTICHOKE IGE QN: 86 MG/DL (ref 1–129)
AST SERPL-CCNC: 18 U/L (ref 17–59)
BASOPHILS # BLD AUTO: 0.02 10*3/MM3 (ref 0–0.2)
BASOPHILS NFR BLD AUTO: 0.7 % (ref 0–2)
BILIRUB SERPL-MCNC: 0.1 MG/DL (ref 0.2–1.3)
BUN BLD-MCNC: 11 MG/DL (ref 7–21)
BUN/CREAT SERPL: 10.9 (ref 7–25)
CALCIUM SPEC-SCNC: 9.7 MG/DL (ref 8.4–10.2)
CHLORIDE SERPL-SCNC: 101 MMOL/L (ref 95–110)
CHOLEST SERPL-MCNC: 154 MG/DL (ref 0–199)
CO2 SERPL-SCNC: 27 MMOL/L (ref 22–31)
CREAT BLD-MCNC: 1.01 MG/DL (ref 0.7–1.3)
DEPRECATED RDW RBC AUTO: 47.9 FL (ref 35.1–43.9)
EOSINOPHIL # BLD AUTO: 0.16 10*3/MM3 (ref 0–0.7)
EOSINOPHIL NFR BLD AUTO: 5.7 % (ref 0–7)
ERYTHROCYTE [DISTWIDTH] IN BLOOD BY AUTOMATED COUNT: 14.3 % (ref 11.5–14.5)
FERRITIN SERPL-MCNC: 14.8 NG/ML (ref 17.9–464)
GFR SERPL CREATININE-BSD FRML MDRD: 89 ML/MIN/1.73 (ref 49–113)
GLOBULIN UR ELPH-MCNC: 3.2 GM/DL (ref 2.3–3.5)
GLUCOSE BLD-MCNC: 95 MG/DL (ref 60–100)
HCT VFR BLD AUTO: 37.4 % (ref 39–49)
HDLC SERPL-MCNC: 46 MG/DL (ref 60–200)
HGB BLD-MCNC: 11.8 G/DL (ref 13.7–17.3)
IMM GRANULOCYTES # BLD: 0 10*3/MM3 (ref 0–0.02)
IMM GRANULOCYTES NFR BLD: 0 % (ref 0–0.5)
IRON 24H UR-MRATE: 34 MCG/DL (ref 49–181)
IRON SATN MFR SERPL: 10 % (ref 20–55)
LDLC/HDLC SERPL: 2.06 {RATIO} (ref 0–3.55)
LYMPHOCYTES # BLD AUTO: 0.77 10*3/MM3 (ref 0.6–4.2)
LYMPHOCYTES NFR BLD AUTO: 27.6 % (ref 10–50)
MCH RBC QN AUTO: 28.8 PG (ref 26.5–34)
MCHC RBC AUTO-ENTMCNC: 31.6 G/DL (ref 31.5–36.3)
MCV RBC AUTO: 91.2 FL (ref 80–98)
MONOCYTES # BLD AUTO: 0.38 10*3/MM3 (ref 0–0.9)
MONOCYTES NFR BLD AUTO: 13.6 % (ref 0–12)
NEUTROPHILS # BLD AUTO: 1.46 10*3/MM3 (ref 2–8.6)
NEUTROPHILS NFR BLD AUTO: 52.4 % (ref 37–80)
PLATELET # BLD AUTO: 281 10*3/MM3 (ref 150–450)
PMV BLD AUTO: 9.2 FL (ref 8–12)
POTASSIUM BLD-SCNC: 3.4 MMOL/L (ref 3.5–5.1)
PROT SERPL-MCNC: 6.8 G/DL (ref 6.3–8.6)
RBC # BLD AUTO: 4.1 10*6/MM3 (ref 4.37–5.74)
SODIUM BLD-SCNC: 140 MMOL/L (ref 137–145)
TIBC SERPL-MCNC: 340 MCG/DL (ref 261–462)
TRIGL SERPL-MCNC: 67 MG/DL (ref 20–199)
WBC NRBC COR # BLD: 2.79 10*3/MM3 (ref 3.2–9.8)

## 2018-02-15 PROCEDURE — 83550 IRON BINDING TEST: CPT | Performed by: FAMILY MEDICINE

## 2018-02-15 PROCEDURE — 80053 COMPREHEN METABOLIC PANEL: CPT | Performed by: FAMILY MEDICINE

## 2018-02-15 PROCEDURE — 85025 COMPLETE CBC W/AUTO DIFF WBC: CPT | Performed by: FAMILY MEDICINE

## 2018-02-15 PROCEDURE — 82728 ASSAY OF FERRITIN: CPT | Performed by: FAMILY MEDICINE

## 2018-02-15 PROCEDURE — 83540 ASSAY OF IRON: CPT | Performed by: FAMILY MEDICINE

## 2018-02-15 PROCEDURE — 82306 VITAMIN D 25 HYDROXY: CPT | Performed by: FAMILY MEDICINE

## 2018-02-15 PROCEDURE — 80061 LIPID PANEL: CPT | Performed by: FAMILY MEDICINE

## 2018-02-22 RX ORDER — POTASSIUM CHLORIDE 1500 MG/1
20 TABLET, FILM COATED, EXTENDED RELEASE ORAL 4 TIMES DAILY
Qty: 120 TABLET | Refills: 11 | Status: SHIPPED | OUTPATIENT
Start: 2018-02-22 | End: 2018-07-27 | Stop reason: SDUPTHER

## 2018-02-26 DIAGNOSIS — D50.9 IRON DEFICIENCY ANEMIA, UNSPECIFIED IRON DEFICIENCY ANEMIA TYPE: Primary | ICD-10-CM

## 2018-05-07 ENCOUNTER — OFFICE VISIT (OUTPATIENT)
Dept: FAMILY MEDICINE CLINIC | Facility: CLINIC | Age: 68
End: 2018-05-07

## 2018-05-07 VITALS
DIASTOLIC BLOOD PRESSURE: 76 MMHG | TEMPERATURE: 98.6 F | WEIGHT: 229.8 LBS | BODY MASS INDEX: 30.46 KG/M2 | SYSTOLIC BLOOD PRESSURE: 138 MMHG | HEART RATE: 62 BPM | RESPIRATION RATE: 16 BRPM | HEIGHT: 73 IN

## 2018-05-07 DIAGNOSIS — E78.5 HYPERLIPIDEMIA, UNSPECIFIED HYPERLIPIDEMIA TYPE: Primary | ICD-10-CM

## 2018-05-07 DIAGNOSIS — K62.5 RECTAL BLEEDING: ICD-10-CM

## 2018-05-07 DIAGNOSIS — E55.9 VITAMIN D DEFICIENCY: ICD-10-CM

## 2018-05-07 DIAGNOSIS — I10 ESSENTIAL HYPERTENSION: ICD-10-CM

## 2018-05-07 DIAGNOSIS — D50.9 IRON DEFICIENCY ANEMIA, UNSPECIFIED IRON DEFICIENCY ANEMIA TYPE: ICD-10-CM

## 2018-05-07 DIAGNOSIS — K62.7 RADIATION PROCTITIS: ICD-10-CM

## 2018-05-07 DIAGNOSIS — D50.8 OTHER IRON DEFICIENCY ANEMIA: ICD-10-CM

## 2018-05-07 PROCEDURE — 99214 OFFICE O/P EST MOD 30 MIN: CPT | Performed by: FAMILY MEDICINE

## 2018-05-07 RX ORDER — SILDENAFIL CITRATE 100 MG
100 TABLET ORAL DAILY PRN
COMMUNITY
Start: 2018-05-03 | End: 2019-07-24 | Stop reason: SDUPTHER

## 2018-05-07 NOTE — PROGRESS NOTES
Subjective   German Ray is a 67 y.o. male.     History of Present Illness     Problem List  1. Essential Hypertension  2. Hyperlipidemia ASCVD risk high   3. Depression  4. Hypokalemia  5. Bereavement  6. Iron deficiency anemia  7. Vitamin D deficiency  8. H/O prostate cancer sp radiation therapy and seed therapy - sees Oncologist every 6 months.  9. Diverticulosis  10. H/O Lower GI bleeding   11. Radiation proctitis/Rectal Bleeding/Rectal ulcer   12. Hemorrhoids  13. Lipoma of upper back  14. BPH  15. Erectile Dysfunction  16. Internal/External Hemorrhoids  17. Diverticulosis of sigmoid colon     Pt is is 65 yo AAM with the above medical issues. Is here for recheck on blood pressure  Pt has history of iron deficiency anemia and is taking iron pill 325 mg PO TID.  His last hemoglobin was 11.9 .  Also has history of hypertension and is on Cardura and HCTZ 25 and micardidis-HCTZ mg PO q daily.  For hyperlipidemia pt is taking pravastatin. For depression pt is taking celexa. Stable today.  BP slightly  Elevated today but at home runs <140/90. Denies any chest pain, headaches or dizziness. Last lipid panel was normal . States he is in good spirits.  He is better emotionally since his wife passed away last year.      Pt did see Gastroenterology for blood in stool and rectal bleeding a colonoscopy and subsequent biopsy for erythema  was done that showed a rectal ulcer. Pt was started on mesalamine 1000 mg rectal qhs.  He also has diverticulosis and internal/external hemorrhoids and fiber supplemetns were recommended. Pt denies any active bleeding and is tolerating suppository medication. He still has occasional weakness and fatigue but no dizziness or syncopal episodes. He is due for recheck on lipid panel,  Thyroid studies and hga1c.  Pt also needs refill on potassium pill BID      2/14/18 - pt is here for recheck. He is due for recheck on thyroid studies, Vitamin D levels and lipid panel. Pt also has iron  deficiency anemia. He had colonoscopy last year that showed diverticulosis and internal/external hemorrhoids. He continues to take mesalamine 1000 mg supository. Pt states he is doing well although he does feel fatigued and tired during the day. He averages about 4 hours a night.   He still has episodes of grieving with loss of wife. Pt denies lonliness.. Pt also had mole on back that come off. He was referred to Dermatologist but did not make appt. Currently it is not bothering him.      5/7/18 pt is here for followup and recheck. Since last visit pt has been referred for sleep study with Dr. Bella. He also has been referred to Hematologist regarding iron deficiency anemia with Dr. Beth.  He may need iron infusion therapy. He recently saw Hematologist.  He still has fatigue. He also had sleep study but was unable to sleep.  A study was not rescheduled. He is due for recheck on lipid panel. He continues to take pravastatin.   He was on mesalamine before for rectal bleeding but he has not needed it.  He denies abdominal pain         The following portions of the patient's history were reviewed and updated as appropriate: allergies, current medications, past family history, past medical history, past social history, past surgical history and problem list.  Patient Active Problem List   Diagnosis   • Hyperlipidemia   • Impaired fasting glucose    • Vitamin D deficiency    • Depression   • Bereavement   • Anemia   • Iron deficiency anemia   • Need for hepatitis C screening test   • Palpable mass of neck   • Encounter for immunization   • Radiation proctitis   • Diverticulosis of large intestine without hemorrhage   • Lower GI bleeding   • Pruritic rash   • Lipoma of back   • Essential hypertension   • Erectile dysfunction   • Need for vaccination   • Medicare annual wellness visit, initial   • Rectal bleeding   • Change in bowel habits   • General medical examination   • Sleep disorder   • Other fatigue     Current  "Outpatient Prescriptions on File Prior to Visit   Medication Sig Dispense Refill   • Calcium Carb-Cholecalciferol (OSTEO-PORETICAL) 600-1000 MG-UNIT tablet Take 1 tablet by mouth daily.     • diltiaZEM (TIAZAC) 360 MG 24 hr capsule Take 1 capsule by mouth Daily. 30 capsule 11   • doxazosin (CARDURA) 8 MG tablet Take 1 tablet by mouth Every Night. 30 tablet 11   • ferrous sulfate 325 (65 FE) MG EC tablet Take 1 tablet by mouth 3 (Three) Times a Day With Meals. 90 tablet 11   • hydrochlorothiazide (HYDRODIURIL) 50 MG tablet Take 1 tablet by mouth Daily. 30 tablet 11   • potassium chloride (K-TAB) 20 MEQ tablet controlled-release ER tablet Take 1 tablet by mouth 4 (Four) Times a Day. 120 tablet 11   • pravastatin (PRAVACHOL) 80 MG tablet Take 1 tablet by mouth Every Night. 30 tablet 11   • telmisartan (MICARDIS) 80 MG tablet Take 1 tablet by mouth Daily. 30 tablet 11   • vitamin D (ERGOCALCIFEROL) 44341 units capsule capsule Take 1 capsule by mouth 1 (One) Time Per Week. 4 capsule 11   • [DISCONTINUED] citalopram (CELEXA) 20 MG tablet Take 2 tablets by mouth Daily. 31 tablet 11   • [DISCONTINUED] sildenafil (VIAGRA) 25 MG tablet Take 1 tablet by mouth As Needed for erectile dysfunction. 10 tablet 3     No current facility-administered medications on file prior to visit.        Review of Systems   Constitutional: Positive for activity change and fatigue.   HENT: Negative.    Eyes: Negative.    Respiratory: Negative.    Cardiovascular: Negative.    Gastrointestinal: Negative.    Endocrine: Negative.    Genitourinary: Negative.    Musculoskeletal: Negative.    Skin: Negative.    Allergic/Immunologic: Negative.    Neurological: Negative.    Hematological: Negative.    Psychiatric/Behavioral: Positive for sleep disturbance.       Objective    /76   Pulse 62   Temp 98.6 °F (37 °C)   Resp 16   Ht 185.4 cm (73\")   Wt 104 kg (229 lb 12.8 oz)   BMI 30.32 kg/m²           Chemistry        Component Value Date/Time    "  02/15/2018 1038    K 3.4 (L) 02/15/2018 1038     02/15/2018 1038    CO2 27.0 02/15/2018 1038    BUN 11 02/15/2018 1038    CREATININE 1.01 02/15/2018 1038        Component Value Date/Time    CALCIUM 9.7 02/15/2018 1038    ALKPHOS 90 02/15/2018 1038    AST 18 02/15/2018 1038    ALT 30 02/15/2018 1038    BILITOT 0.1 (L) 02/15/2018 1038        Lab Results   Component Value Date    WBC 2.79 (L) 02/15/2018    HGB 11.8 (L) 02/15/2018    HCT 37.4 (L) 02/15/2018    MCV 91.2 02/15/2018     02/15/2018     .lastipid  Lab Results   Component Value Date    HGBA1C 5.6 11/16/2017     Lab Results   Component Value Date    TSH 2.260 11/16/2017     Lab on 02/15/2018   Component Date Value Ref Range Status   • 25 Hydroxy, Vitamin D 02/15/2018 47.0  30.0 - 100.0 ng/ml Final       Physical Exam   Constitutional: He is oriented to person, place, and time. He appears well-developed and well-nourished. No distress.   HENT:   Head: Normocephalic and atraumatic.   Right Ear: External ear normal.   Left Ear: External ear normal.   Eyes: Conjunctivae and EOM are normal. Pupils are equal, round, and reactive to light. Right eye exhibits no discharge. Left eye exhibits no discharge. No scleral icterus.   Neck: Normal range of motion. Neck supple. No JVD present. No tracheal deviation present. No thyromegaly present.   Cardiovascular: Normal rate, regular rhythm and normal heart sounds.    Pulmonary/Chest: Effort normal. No stridor.   Abdominal: Bowel sounds are normal. He exhibits no distension and no mass. There is no tenderness. There is no rebound and no guarding. No hernia.   Musculoskeletal: Normal range of motion. He exhibits no edema, tenderness or deformity.   Lymphadenopathy:     He has no cervical adenopathy.   Neurological: He is alert and oriented to person, place, and time. He has normal reflexes. No cranial nerve deficit. Coordination normal.   Skin: Skin is warm and dry. No rash noted. He is not diaphoretic. No  erythema. No pallor.   Mole on lower back has stuck on appearance.  No bleeding.  Possible sebborrheic keratosis    Psychiatric: He has a normal mood and affect. His behavior is normal.   Nursing note and vitals reviewed.      Assessment/Plan   Problems Addressed this Visit        Cardiovascular and Mediastinum    Hyperlipidemia - Primary    Relevant Orders    Lipid Panel    Essential hypertension    Relevant Orders    Basic metabolic panel       Digestive    Vitamin D deficiency     Relevant Orders    Vitamin D 25 Hydroxy    Radiation proctitis    Rectal bleeding       Hematopoietic and Hemostatic    Anemia    Iron deficiency anemia      Other Visit Diagnoses    None.       -fatigue/sleep disorder -  Pt referred to sleep study with Dr. Bella but did not complete sleep study. Pt was given number to reschedule sleep study   - Vitamin D deficiency - continue vitamin D pill. Recheck vitamin D levels   - Essential hypertension - increased HCTZ from  50 mg PO q daily.  Continue micardis 80 mg daily , cardura. BP at goal today   - hyperlipidemia - check lipid panel.  Continue pravastatin.  He is not taking aspirin   -rectal ulcer/diverticulosis - continue mesalamine suppository 1000 mg rectal at bedtime.  Recommend high fiber diet   -depression - stable pt stopped taking celexa Pt declines counseling. He has a female  in [place], [place].     -Anemia - Hematologist following . Iron deficiency anemia. Continue iron pill TID. Hematology following.  Have last Hematologist office note.   May need to need iron therapy  - For mole on back.  Has stuck on appearance. Consider cryotherapy or shave biopsy in the future. Refferral to Dr. Neves for further evaluation was made but mole came off before appt.   -Prevnar 13 vaccination prescription given today. pharmayc to give  -recheck in 2 months           This document has been electronically signed by Abel Hoffman MD on May 7, 2018 1:53 PM                 Review of  Systems   Constitutional: Positive for activity change and fatigue.   HENT: Negative.    Eyes: Negative.    Respiratory: Negative.    Cardiovascular: Negative.    Gastrointestinal: Negative.    Endocrine: Negative.    Genitourinary: Negative.    Musculoskeletal: Negative.    Skin: Negative.    Allergic/Immunologic: Negative.    Neurological: Negative.    Hematological: Negative.    Psychiatric/Behavioral: Positive for sleep disturbance.       Physical Exam   Constitutional: He is oriented to person, place, and time. He appears well-developed and well-nourished. No distress.   HENT:   Head: Normocephalic and atraumatic.   Right Ear: External ear normal.   Left Ear: External ear normal.   Eyes: Conjunctivae and EOM are normal. Pupils are equal, round, and reactive to light. Right eye exhibits no discharge. Left eye exhibits no discharge. No scleral icterus.   Neck: Normal range of motion. Neck supple. No JVD present. No tracheal deviation present. No thyromegaly present.   Cardiovascular: Normal rate, regular rhythm and normal heart sounds.    Pulmonary/Chest: Effort normal. No stridor.   Abdominal: Bowel sounds are normal. He exhibits no distension and no mass. There is no tenderness. There is no rebound and no guarding. No hernia.   Musculoskeletal: Normal range of motion. He exhibits no edema, tenderness or deformity.   Lymphadenopathy:     He has no cervical adenopathy.   Neurological: He is alert and oriented to person, place, and time. He has normal reflexes. No cranial nerve deficit. Coordination normal.   Skin: Skin is warm and dry. No rash noted. He is not diaphoretic. No erythema. No pallor.   Mole on lower back has stuck on appearance.  No bleeding.  Possible sebborrheic keratosis    Psychiatric: He has a normal mood and affect. His behavior is normal.   Nursing note and vitals reviewed.

## 2018-05-08 PROCEDURE — 82306 VITAMIN D 25 HYDROXY: CPT | Performed by: FAMILY MEDICINE

## 2018-05-08 PROCEDURE — 80048 BASIC METABOLIC PNL TOTAL CA: CPT | Performed by: FAMILY MEDICINE

## 2018-05-08 PROCEDURE — 36415 COLL VENOUS BLD VENIPUNCTURE: CPT | Performed by: FAMILY MEDICINE

## 2018-05-08 PROCEDURE — 80061 LIPID PANEL: CPT | Performed by: FAMILY MEDICINE

## 2018-05-09 LAB
25(OH)D3 SERPL-MCNC: 39.6 NG/ML (ref 30–100)
ANION GAP SERPL CALCULATED.3IONS-SCNC: 9 MMOL/L (ref 5–15)
ARTICHOKE IGE QN: 76 MG/DL (ref 1–129)
BUN BLD-MCNC: 19 MG/DL (ref 7–21)
BUN/CREAT SERPL: 20.2 (ref 7–25)
CALCIUM SPEC-SCNC: 9.4 MG/DL (ref 8.4–10.2)
CHLORIDE SERPL-SCNC: 104 MMOL/L (ref 95–110)
CHOLEST SERPL-MCNC: 168 MG/DL (ref 0–199)
CO2 SERPL-SCNC: 28 MMOL/L (ref 22–31)
CREAT BLD-MCNC: 0.94 MG/DL (ref 0.7–1.3)
GFR SERPL CREATININE-BSD FRML MDRD: 96 ML/MIN/1.73 (ref 49–113)
GLUCOSE BLD-MCNC: 87 MG/DL (ref 60–100)
HDLC SERPL-MCNC: 69 MG/DL (ref 60–200)
LDLC/HDLC SERPL: 1.25 {RATIO} (ref 0–3.55)
POTASSIUM BLD-SCNC: 3.8 MMOL/L (ref 3.5–5.1)
SODIUM BLD-SCNC: 141 MMOL/L (ref 137–145)
TRIGL SERPL-MCNC: 63 MG/DL (ref 20–199)

## 2018-05-21 ENCOUNTER — TELEPHONE (OUTPATIENT)
Dept: FAMILY MEDICINE CLINIC | Facility: CLINIC | Age: 68
End: 2018-05-21

## 2018-05-21 RX ORDER — CITALOPRAM 20 MG/1
20 TABLET ORAL DAILY
Qty: 30 TABLET | Refills: 3 | Status: SHIPPED | OUTPATIENT
Start: 2018-05-21 | End: 2018-07-27 | Stop reason: SDUPTHER

## 2018-05-21 NOTE — TELEPHONE ENCOUNTER
Patient would like a refill on Celexa 20mg, 2 tabs by mouth daily. He needs [facility] to [facility].

## 2018-07-27 ENCOUNTER — OFFICE VISIT (OUTPATIENT)
Dept: FAMILY MEDICINE CLINIC | Facility: CLINIC | Age: 68
End: 2018-07-27

## 2018-07-27 VITALS
HEART RATE: 65 BPM | SYSTOLIC BLOOD PRESSURE: 120 MMHG | BODY MASS INDEX: 30.32 KG/M2 | DIASTOLIC BLOOD PRESSURE: 70 MMHG | TEMPERATURE: 98.6 F | HEIGHT: 73 IN | OXYGEN SATURATION: 98 % | WEIGHT: 228.8 LBS

## 2018-07-27 DIAGNOSIS — K62.5 RECTAL BLEEDING: ICD-10-CM

## 2018-07-27 DIAGNOSIS — I10 ESSENTIAL HYPERTENSION: Primary | ICD-10-CM

## 2018-07-27 DIAGNOSIS — E66.9 OBESITY, UNSPECIFIED CLASSIFICATION, UNSPECIFIED OBESITY TYPE, UNSPECIFIED WHETHER SERIOUS COMORBIDITY PRESENT: ICD-10-CM

## 2018-07-27 DIAGNOSIS — E78.5 HYPERLIPIDEMIA, UNSPECIFIED HYPERLIPIDEMIA TYPE: ICD-10-CM

## 2018-07-27 DIAGNOSIS — E55.9 VITAMIN D DEFICIENCY: ICD-10-CM

## 2018-07-27 DIAGNOSIS — F32.A DEPRESSION, UNSPECIFIED DEPRESSION TYPE: ICD-10-CM

## 2018-07-27 DIAGNOSIS — K92.2 LOWER GI BLEEDING: ICD-10-CM

## 2018-07-27 DIAGNOSIS — D50.8 OTHER IRON DEFICIENCY ANEMIA: ICD-10-CM

## 2018-07-27 DIAGNOSIS — D50.9 IRON DEFICIENCY ANEMIA, UNSPECIFIED IRON DEFICIENCY ANEMIA TYPE: ICD-10-CM

## 2018-07-27 PROCEDURE — 99214 OFFICE O/P EST MOD 30 MIN: CPT | Performed by: FAMILY MEDICINE

## 2018-07-27 RX ORDER — TELMISARTAN 80 MG/1
80 TABLET ORAL DAILY
Qty: 90 TABLET | Refills: 3 | Status: SHIPPED | OUTPATIENT
Start: 2018-07-27 | End: 2018-12-12 | Stop reason: SDUPTHER

## 2018-07-27 RX ORDER — PRAVASTATIN SODIUM 80 MG/1
80 TABLET ORAL NIGHTLY
Qty: 90 TABLET | Refills: 3 | Status: SHIPPED | OUTPATIENT
Start: 2018-07-27 | End: 2018-12-12 | Stop reason: SDUPTHER

## 2018-07-27 RX ORDER — CITALOPRAM 20 MG/1
20 TABLET ORAL DAILY
Qty: 90 TABLET | Refills: 3 | Status: SHIPPED | OUTPATIENT
Start: 2018-07-27 | End: 2018-12-12

## 2018-07-27 RX ORDER — ERGOCALCIFEROL 1.25 MG/1
50000 CAPSULE ORAL
Qty: 12 CAPSULE | Refills: 3 | Status: SHIPPED | OUTPATIENT
Start: 2018-07-27 | End: 2019-07-24 | Stop reason: SDUPTHER

## 2018-07-27 RX ORDER — DILTIAZEM HYDROCHLORIDE 360 MG/1
360 CAPSULE, EXTENDED RELEASE ORAL DAILY
Qty: 90 CAPSULE | Refills: 3 | Status: SHIPPED | OUTPATIENT
Start: 2018-07-27 | End: 2018-12-12 | Stop reason: SDUPTHER

## 2018-07-27 RX ORDER — DOXAZOSIN 8 MG/1
8 TABLET ORAL NIGHTLY
Qty: 90 TABLET | Refills: 3 | Status: SHIPPED | OUTPATIENT
Start: 2018-07-27 | End: 2019-07-24 | Stop reason: SDUPTHER

## 2018-07-27 RX ORDER — HYDROCHLOROTHIAZIDE 50 MG/1
50 TABLET ORAL DAILY
Qty: 90 TABLET | Refills: 3 | Status: SHIPPED | OUTPATIENT
Start: 2018-07-27 | End: 2018-12-12 | Stop reason: SDUPTHER

## 2018-07-27 RX ORDER — LANOLIN ALCOHOL/MO/W.PET/CERES
325 CREAM (GRAM) TOPICAL
Qty: 270 TABLET | Refills: 3 | Status: SHIPPED | OUTPATIENT
Start: 2018-07-27 | End: 2018-12-12 | Stop reason: SDUPTHER

## 2018-07-27 RX ORDER — POTASSIUM CHLORIDE 1500 MG/1
20 TABLET, FILM COATED, EXTENDED RELEASE ORAL 4 TIMES DAILY
Qty: 360 TABLET | Refills: 3 | Status: SHIPPED | OUTPATIENT
Start: 2018-07-27 | End: 2018-12-12 | Stop reason: SDUPTHER

## 2018-08-03 ENCOUNTER — TELEPHONE (OUTPATIENT)
Dept: FAMILY MEDICINE CLINIC | Facility: CLINIC | Age: 68
End: 2018-08-03

## 2018-08-03 NOTE — TELEPHONE ENCOUNTER
----- Message from Abel Hoffman MD sent at 8/3/2018  7:45 AM CDT -----  Regarding: medication   New medication sent. Let pt know. Thanks   ----- Message -----  From: Kaitlin Light MA  Sent: 8/2/2018   4:36 PM  To: Abel Hoffman MD    Sorry if you didn't understand my message but you need to send in the new 600/400mg  ----- Message -----  From: Abel Hoffman MD  Sent: 8/2/2018   4:11 PM  To: Kaitlin Light MA    That's okay.  Have him take 1 tablet 600/400 mg PO q daily. Give 30 pills and 3 refills. Thanks  ----- Message -----  From: Kaitlin Light MA  Sent: 8/2/2018   3:28 PM  To: [facility]. Called stating they don't carry calcuim carb in a 600/1000 only in a 600/400, I tried to change it but it would not take it at that mg.    Tried to call pt,left message about new script

## 2018-08-30 ENCOUNTER — OFFICE VISIT (OUTPATIENT)
Dept: GASTROENTEROLOGY | Facility: CLINIC | Age: 68
End: 2018-08-30

## 2018-08-30 VITALS
DIASTOLIC BLOOD PRESSURE: 60 MMHG | BODY MASS INDEX: 30.62 KG/M2 | HEART RATE: 67 BPM | SYSTOLIC BLOOD PRESSURE: 114 MMHG | WEIGHT: 231 LBS | HEIGHT: 73 IN | OXYGEN SATURATION: 96 %

## 2018-08-30 DIAGNOSIS — K62.5 RECTAL BLEEDING: ICD-10-CM

## 2018-08-30 DIAGNOSIS — K62.6 RECTAL/ANAL ULCER: Primary | ICD-10-CM

## 2018-08-30 PROCEDURE — 99212 OFFICE O/P EST SF 10 MIN: CPT | Performed by: NURSE PRACTITIONER

## 2018-08-30 NOTE — PROGRESS NOTES
Chief Complaint   Patient presents with   • Rectal Ulcer   • Black or Bloody Stool       Subjective    German Ray is a 67 y.o. male. he is here today for follow-up.    History of Present Illness  67-year-old male presents for follow-up regarding rectal ulcer and history of blood due to radiation proctitis.  Denies any current abdominal pain.  Has been following with hematologist regarding anemia in [facility].  States he still has intermittent rectal bleeding as he strains however typically eats prunes or will take stool softener as needed.  Denies any nausea vomiting or abdominal pain.  His weight is stable.  Walks about 2 miles per day for exercise.  At last visit he was prescribed Canasa suppositories which helped symptoms does not need a refill at this time.  Reports she's had rectal exams her primary care and hematologist.  Last colonoscopy was 10/19/17.  Plan; follow-up in GI office as needed.       The following portions of the patient's history were reviewed and updated as appropriate:   Past Medical History:   Diagnosis Date   • Anemia    • Depression     Major, single episode, unspecified   • Diverticular disease of colon    • Encounter for screening for diabetes mellitus    • Encounter for screening for endocrine disorder     Suspected   • Hemorrhage of anus and rectum    • Hyperlipidemia    • Hypertension    • Hypokalemia    • Primary malignant neoplasm of prostate (CMS/HCC)     prostate   • Radiation proctitis    • Vitamin D deficiency      Past Surgical History:   Procedure Laterality Date   • APPENDECTOMY     • CATARACT EXTRACTION Bilateral 2013   • COLONOSCOPY      diverticulosis in hte sigmoid colon. Altered vascular mucosa in hte rectum. hemorrhoids, no specimens collected, radation proctitis   • COLONOSCOPY N/A 10/19/2017    Procedure: COLONOSCOPY;  Surgeon: Patric Wooten MD;  Location: [facility] ENDOSCOPY;  Service:      Family History   Problem Relation Age of Onset   •  Cancer Mother    • Hypertension Brother        Current Outpatient Prescriptions   Medication Sig Dispense Refill   • calcium carbonate-vitamin d 600-400 MG-UNIT per tablet Take 1 tablet by mouth Daily. 30 tablet 3   • citalopram (CELEXA) 20 MG tablet Take 1 tablet by mouth Daily. 90 tablet 3   • diltiaZEM (TIAZAC) 360 MG 24 hr capsule Take 1 capsule by mouth Daily. 90 capsule 3   • doxazosin (CARDURA) 8 MG tablet Take 1 tablet by mouth Every Night. 90 tablet 3   • ferrous sulfate 325 (65 FE) MG EC tablet Take 1 tablet by mouth 3 (Three) Times a Day With Meals. 270 tablet 3   • hydrochlorothiazide (HYDRODIURIL) 50 MG tablet Take 1 tablet by mouth Daily. 90 tablet 3   • potassium chloride ER (K-TAB) 20 MEQ tablet controlled-release ER tablet Take 1 tablet by mouth 4 (Four) Times a Day. 360 tablet 3   • pravastatin (PRAVACHOL) 80 MG tablet Take 1 tablet by mouth Every Night. 90 tablet 3   • telmisartan (MICARDIS) 80 MG tablet Take 1 tablet by mouth Daily. 90 tablet 3   • VIAGRA 100 MG tablet Take 100 mg by mouth Daily As Needed.     • vitamin D (ERGOCALCIFEROL) 27531 units capsule capsule Take 1 capsule by mouth Every 7 (Seven) Days. 12 capsule 3     No current facility-administered medications for this visit.      No Known Allergies  Social History     Social History   • Marital status:      Social History Main Topics   • Smoking status: Never Smoker   • Smokeless tobacco: Never Used   • Alcohol use Yes      Comment: 08/30/2018 - Patient reports occasional consumption of alcoholic beverages.   • Drug use: No   • Sexual activity: Defer     Other Topics Concern   • Not on file       Review of Systems  Review of Systems   Constitutional: Negative for activity change, appetite change, chills, diaphoresis, fatigue, fever and unexpected weight change.   HENT: Negative for sore throat and trouble swallowing.    Respiratory: Negative for shortness of breath.    Gastrointestinal: Positive for anal bleeding (intermittent  "secondary to rectal ulcer /radiation proctitis secondary to prostate cancer treatment.). Negative for abdominal distention, abdominal pain, blood in stool, constipation, diarrhea, nausea, rectal pain and vomiting.   Musculoskeletal: Negative for arthralgias.   Skin: Negative for pallor.   Neurological: Negative for light-headedness.        /60 (BP Location: Left arm, Patient Position: Sitting, Cuff Size: Large Adult)   Pulse 67   Ht 185.4 cm (73\")   Wt 105 kg (231 lb)   SpO2 96%   BMI 30.48 kg/m²     Objective    Physical Exam   Constitutional: He is oriented to person, place, and time. He appears well-developed and well-nourished. He is cooperative. No distress.   HENT:   Head: Normocephalic and atraumatic.   Neck: Normal range of motion. Neck supple. No thyromegaly present.   Cardiovascular: Normal rate, regular rhythm and normal heart sounds.    Pulmonary/Chest: Effort normal and breath sounds normal. He has no wheezes. He has no rhonchi. He has no rales.   Abdominal: Soft. Normal appearance and bowel sounds are normal. He exhibits no distension. There is no hepatosplenomegaly. There is no tenderness. There is no rigidity and no guarding. No hernia.   Lymphadenopathy:     He has no cervical adenopathy.   Neurological: He is alert and oriented to person, place, and time.   Skin: Skin is warm, dry and intact. No rash noted. No pallor.   Psychiatric: He has a normal mood and affect. His speech is normal.     Office Visit on 05/07/2018   Component Date Value Ref Range Status   • Total Cholesterol 05/08/2018 168  0 - 199 mg/dL Final   • Triglycerides 05/08/2018 63  20 - 199 mg/dL Final   • HDL Cholesterol 05/08/2018 69  60 - 200 mg/dL Final   • LDL Cholesterol  05/08/2018 76  1 - 129 mg/dL Final   • LDL/HDL Ratio 05/08/2018 1.25  0.00 - 3.55 Final   • 25 Hydroxy, Vitamin D 05/08/2018 39.6  30.0 - 100.0 ng/ml Final   • Glucose 05/08/2018 87  60 - 100 mg/dL Final   • BUN 05/08/2018 19  7 - 21 mg/dL Final   • " Creatinine 05/08/2018 0.94  0.70 - 1.30 mg/dL Final   • Sodium 05/08/2018 141  137 - 145 mmol/L Final   • Potassium 05/08/2018 3.8  3.5 - 5.1 mmol/L Final   • Chloride 05/08/2018 104  95 - 110 mmol/L Final   • CO2 05/08/2018 28.0  22.0 - 31.0 mmol/L Final   • Calcium 05/08/2018 9.4  8.4 - 10.2 mg/dL Final   • eGFR   Amer 05/08/2018 96  49 - 113 mL/min/1.73 Final   • BUN/Creatinine Ratio 05/08/2018 20.2  7.0 - 25.0 Final   • Anion Gap 05/08/2018 9.0  5.0 - 15.0 mmol/L Final     Assessment/Plan      1. Rectal/anal ulcer    2. Rectal bleeding    .       Orders placed during this encounter include:  No orders of the defined types were placed in this encounter.      * Surgery not found *    Review and/or summary of lab tests, radiology, procedures, medications. Review and summary of old records and obtaining of history. The risks and benefits of my recommendations, as well as other treatment options were discussed with the patient today. Questions were answered.    No orders of the defined types were placed in this encounter.      Follow-up: Return if symptoms worsen or fail to improve.          This document has been electronically signed by JOSE MIGUEL Ospina on August 30, 2018 2:49 PM             Results for orders placed or performed in visit on 05/07/18   Vitamin D 25 Hydroxy   Result Value Ref Range    25 Hydroxy, Vitamin D 39.6 30.0 - 100.0 ng/ml   Lipid Panel   Result Value Ref Range    Total Cholesterol 168 0 - 199 mg/dL    Triglycerides 63 20 - 199 mg/dL    HDL Cholesterol 69 60 - 200 mg/dL    LDL Cholesterol  76 1 - 129 mg/dL    LDL/HDL Ratio 1.25 0.00 - 3.55   Basic metabolic panel   Result Value Ref Range    Glucose 87 60 - 100 mg/dL    BUN 19 7 - 21 mg/dL    Creatinine 0.94 0.70 - 1.30 mg/dL    Sodium 141 137 - 145 mmol/L    Potassium 3.8 3.5 - 5.1 mmol/L    Chloride 104 95 - 110 mmol/L    CO2 28.0 22.0 - 31.0 mmol/L    Calcium 9.4 8.4 - 10.2 mg/dL    eGFR   Amer 96 49 - 113 mL/min/1.73     BUN/Creatinine Ratio 20.2 7.0 - 25.0    Anion Gap 9.0 5.0 - 15.0 mmol/L   Results for orders placed or performed in visit on 02/15/18   Vitamin D 25 Hydroxy   Result Value Ref Range    25 Hydroxy, Vitamin D 47.0 30.0 - 100.0 ng/ml   Results for orders placed or performed in visit on 02/14/18   CBC Auto Differential   Result Value Ref Range    WBC 2.79 (L) 3.20 - 9.80 10*3/mm3    RBC 4.10 (L) 4.37 - 5.74 10*6/mm3    Hemoglobin 11.8 (L) 13.7 - 17.3 g/dL    Hematocrit 37.4 (L) 39.0 - 49.0 %    MCV 91.2 80.0 - 98.0 fL    MCH 28.8 26.5 - 34.0 pg    MCHC 31.6 31.5 - 36.3 g/dL    RDW 14.3 11.5 - 14.5 %    RDW-SD 47.9 (H) 35.1 - 43.9 fl    MPV 9.2 8.0 - 12.0 fL    Platelets 281 150 - 450 10*3/mm3    Neutrophil % 52.4 37.0 - 80.0 %    Lymphocyte % 27.6 10.0 - 50.0 %    Monocyte % 13.6 (H) 0.0 - 12.0 %    Eosinophil % 5.7 0.0 - 7.0 %    Basophil % 0.7 0.0 - 2.0 %    Immature Grans % 0.0 0.0 - 0.5 %    Neutrophils, Absolute 1.46 (L) 2.00 - 8.60 10*3/mm3    Lymphocytes, Absolute 0.77 0.60 - 4.20 10*3/mm3    Monocytes, Absolute 0.38 0.00 - 0.90 10*3/mm3    Eosinophils, Absolute 0.16 0.00 - 0.70 10*3/mm3    Basophils, Absolute 0.02 0.00 - 0.20 10*3/mm3    Immature Grans, Absolute 0.00 0.00 - 0.02 10*3/mm3   Iron Profile   Result Value Ref Range    Iron 34 (L) 49 - 181 mcg/dL    TIBC 340 261 - 462 mcg/dL    Iron Saturation 10 (L) 20 - 55 %   Ferritin   Result Value Ref Range    Ferritin 14.80 (L) 17.90 - 464.00 ng/mL   Lipid Panel   Result Value Ref Range    Total Cholesterol 154 0 - 199 mg/dL    Triglycerides 67 20 - 199 mg/dL    HDL Cholesterol 46 (L) 60 - 200 mg/dL    LDL Cholesterol  86 1 - 129 mg/dL    LDL/HDL Ratio 2.06 0.00 - 3.55   Comprehensive Metabolic Panel   Result Value Ref Range    Glucose 95 60 - 100 mg/dL    BUN 11 7 - 21 mg/dL    Creatinine 1.01 0.70 - 1.30 mg/dL    Sodium 140 137 - 145 mmol/L    Potassium 3.4 (L) 3.5 - 5.1 mmol/L    Chloride 101 95 - 110 mmol/L    CO2 27.0 22.0 - 31.0 mmol/L    Calcium 9.7 8.4 -  10.2 mg/dL    Total Protein 6.8 6.3 - 8.6 g/dL    Albumin 3.60 3.40 - 4.80 g/dL    ALT (SGPT) 30 21 - 72 U/L    AST (SGOT) 18 17 - 59 U/L    Alkaline Phosphatase 90 38 - 126 U/L    Total Bilirubin 0.1 (L) 0.2 - 1.3 mg/dL    eGFR   Amer 89 49 - 113 mL/min/1.73    Globulin 3.2 2.3 - 3.5 gm/dL    A/G Ratio 1.1 1.1 - 1.8 g/dL    BUN/Creatinine Ratio 10.9 7.0 - 25.0    Anion Gap 12.0 5.0 - 15.0 mmol/L   Results for orders placed or performed in visit on 11/15/17   CBC Auto Differential   Result Value Ref Range    WBC 4.29 3.20 - 9.80 10*3/mm3    RBC 4.09 (L) 4.37 - 5.74 10*6/mm3    Hemoglobin 11.9 (L) 13.7 - 17.3 g/dL    Hematocrit 37.5 (L) 39.0 - 49.0 %    MCV 91.7 80.0 - 98.0 fL    MCH 29.1 26.5 - 34.0 pg    MCHC 31.7 31.5 - 36.3 g/dL    RDW 14.0 11.5 - 14.5 %    RDW-SD 47.3 (H) 35.1 - 43.9 fl    MPV 9.7 8.0 - 12.0 fL    Platelets 309 150 - 450 10*3/mm3    Neutrophil % 62.0 37.0 - 80.0 %    Lymphocyte % 22.6 10.0 - 50.0 %    Monocyte % 10.5 0.0 - 12.0 %    Eosinophil % 4.0 0.0 - 7.0 %    Basophil % 0.7 0.0 - 2.0 %    Immature Grans % 0.2 0.0 - 0.5 %    Neutrophils, Absolute 2.66 2.00 - 8.60 10*3/mm3    Lymphocytes, Absolute 0.97 0.60 - 4.20 10*3/mm3    Monocytes, Absolute 0.45 0.00 - 0.90 10*3/mm3    Eosinophils, Absolute 0.17 0.00 - 0.70 10*3/mm3    Basophils, Absolute 0.03 0.00 - 0.20 10*3/mm3    Immature Grans, Absolute 0.01 0.00 - 0.02 10*3/mm3   Iron Profile   Result Value Ref Range    Iron 30 (L) 49 - 181 mcg/dL    TIBC 352 261 - 462 mcg/dL    Iron Saturation 9 (L) 20 - 55 %   Vitamin D 25 Hydroxy   Result Value Ref Range    25 Hydroxy, Vitamin D 14.5 (L) 30.0 - 100.0 ng/ml   T3, Free   Result Value Ref Range    T3, Free 2.7 2.0 - 4.4 pg/mL   TSH   Result Value Ref Range    TSH 2.260 0.460 - 4.680 mIU/mL   T4, Free   Result Value Ref Range    Free T4 1.04 0.78 - 2.19 ng/dL   Hemoglobin A1c   Result Value Ref Range    Hemoglobin A1C 5.6 4 - 5.6 %     *Note: Due to a large number of results and/or encounters  for the requested time period, some results have not been displayed. A complete set of results can be found in Results Review.

## 2018-08-30 NOTE — PATIENT INSTRUCTIONS

## 2018-12-08 NOTE — PROGRESS NOTES
Subjective   German Ray is a 68 y.o. male.   Problem List  1. Essential Hypertension  2. Hyperlipidemia ASCVD risk high   3. Depression  4. Hypokalemia  5. Bereavement  6. Iron deficiency anemia  7. Vitamin D deficiency  8. H/O prostate cancer sp radiation therapy and seed therapy - sees Oncologist every 6 months.  9. Diverticulosis  10. H/O Lower GI bleeding   11. Radiation proctitis/Rectal Bleeding/Rectal ulcer   12. Hemorrhoids  13. Lipoma of upper back  14. BPH  15. Erectile Dysfunction  16. Internal/External Hemorrhoids  17. Diverticulosis of sigmoid colon     Pt is is 67 yo AAM with the above medical issues. Is here for recheck on blood pressure  Pt has history of iron deficiency anemia and is taking iron pill 325 mg PO TID.  His last hemoglobin was 11.9 .  Also has history of hypertension and is on Cardura and HCTZ 25 and micardidis-HCTZ mg PO q daily.  For hyperlipidemia pt is taking pravastatin. For depression pt is taking celexa. Stable today.  BP slightly  Elevated today but at home runs <140/90. Denies any chest pain, headaches or dizziness. Last lipid panel was normal . States he is in good spirits.  He is better emotionally since his wife passed away last year.      Pt did see Gastroenterology for blood in stool and rectal bleeding a colonoscopy and subsequent biopsy for erythema  was done that showed a rectal ulcer. Pt was started on mesalamine 1000 mg rectal qhs.  He also has diverticulosis and internal/external hemorrhoids and fiber supplemetns were recommended. Pt denies any active bleeding and is tolerating suppository medication. He still has occasional weakness and fatigue but no dizziness or syncopal episodes. He is due for recheck on lipid panel,  Thyroid studies and hga1c.  Pt also needs refill on potassium pill BID      2/14/18 - pt is here for recheck. He is due for recheck on thyroid studies, Vitamin D levels and lipid panel. Pt also has iron deficiency anemia. He had  colonoscopy last year that showed diverticulosis and internal/external hemorrhoids. He continues to take mesalamine 1000 mg supository. Pt states he is doing well although he does feel fatigued and tired during the day. He averages about 4 hours a night.   He still has episodes of grieving with loss of wife. Pt denies lonliness.. Pt also had mole on back that come off. He was referred to Dermatologist but did not make appt. Currently it is not bothering him.      5/7/18 pt is here for followup and recheck. Since last visit pt has been referred for sleep study with Dr. Bella. He also has been referred to Hematologist regarding iron deficiency anemia with Dr. Beth.  He may need iron infusion therapy. He recently saw Hematologist.  He still has fatigue. He also had sleep study but was unable to sleep.  A study was not rescheduled. He is due for recheck on lipid panel. He continues to take pravastatin.   He was on mesalamine before for rectal bleeding but he has not needed it.  He denies abdominal pain     7/27/18 pt is here for followup and recheck. He has history of hypertension and takes micardis 80 mg PO q daily along with HCTZ 50 mg daily and dilitazem  HE also has BPH and takes Cardura. He is also on celexa 20 mg PO q daily for depression and pravastatin 80 mg PO qhs for hyperlipidemia. Since last visit he his labwork showed normal vitamin D and lipid panel his BMP was normal. His weight last visit was 229 lbs and today it is 228 lbs. BP is at goal today with medications. He continues to have GI bleeding and is seeing Gastroenterology and was on meslamine He has a history of rectal bleeding and radiation proctitis. . He is taking iron pill for anemia. And his anemia has been stable. Energy is better.      12/12/18 pt is here for recheck and followup on iron deficiency anemia, hyeprtension, depression. And hyperlipidemia He is due for new labwork.  He also has a history of rectal ulcer and GI bleeding due to  radiation proctitis. He was given Canasa suppositories which helped symptom.  He continues to see Hematologist for history of prostate cancer . He states he still feels tired at times. He is on celexa 20 mg daily for depression.     Hypertension   This is a chronic problem. The current episode started more than 1 year ago. The problem is unchanged. The problem is controlled. Pertinent negatives include no anxiety, blurred vision, chest pain, headaches, malaise/fatigue, neck pain, orthopnea, palpitations, peripheral edema, PND, shortness of breath or sweats. Risk factors for coronary artery disease include male gender, obesity, dyslipidemia and sedentary lifestyle. Past treatments include angiotensin blockers, calcium channel blockers and diuretics. Current antihypertension treatment includes angiotensin blockers, calcium channel blockers and diuretics. The current treatment provides moderate improvement. There is no history of angina, kidney disease, CAD/MI, CVA, heart failure, left ventricular hypertrophy, PVD or retinopathy. There is no history of chronic renal disease, coarctation of the aorta, hyperaldosteronism, hypercortisolism, hyperparathyroidism, a hypertension causing med, pheochromocytoma, renovascular disease, sleep apnea or a thyroid problem.   Anemia   Presents for follow-up visit. There has been no abdominal pain, anorexia, bruising/bleeding easily, confusion, fever, leg swelling, light-headedness, malaise/fatigue, pallor, palpitations, paresthesias, pica or weight loss. Signs of blood loss that are not present include hematemesis, hematochezia, melena, menorrhagia and vaginal bleeding. There is no history of chronic renal disease or heart failure. There are no compliance problems.  Compliance with medications is 0-25%.   Depression   Visit Type: follow-up  Patient presents with the following symptoms: anhedonia, decreased concentration, depressed mood and impotence.  Patient is not experiencing:  chest pain, choking sensation, compulsions, confusion, dizziness, dry mouth, excessive worry, fatigue, feelings of hopelessness, feelings of worthlessness, hypersomnia, hyperventilation, insomnia, irritability, malaise, memory impairment, muscle tension, nausea, nervousness/anxiety, palpitations, panic, psychomotor agitation, psychomotor retardation, restlessness, shortness of breath, suicidal ideas, suicidal planning, thoughts of death, weight gain and weight loss.  Frequency of symptoms: constantly   Severity: mild   Sleep quality: fair  Nighttime awakenings: none             The following portions of the patient's history were reviewed and updated as appropriate: allergies, current medications, past family history, past medical history, past social history, past surgical history and problem list.  Patient Active Problem List   Diagnosis   • Hyperlipidemia   • Impaired fasting glucose    • Vitamin D deficiency    • Depression   • Bereavement   • Anemia   • Iron deficiency anemia   • Need for hepatitis C screening test   • Palpable mass of neck   • Encounter for immunization   • Radiation proctitis   • Diverticulosis of large intestine without hemorrhage   • Lower GI bleeding   • Pruritic rash   • Lipoma of back   • Essential hypertension   • Erectile dysfunction   • Need for vaccination   • Medicare annual wellness visit, initial   • Rectal bleeding   • Change in bowel habits   • General medical examination   • Sleep disorder   • Other fatigue   • Obesity   • Prediabetes   • Encounter for screening for endocrine disorder   • Moderate episode of recurrent major depressive disorder (CMS/HCC)     Current Outpatient Medications on File Prior to Visit   Medication Sig Dispense Refill   • calcium carbonate-vitamin d 600-400 MG-UNIT per tablet Take 1 tablet by mouth Daily. 30 tablet 3   • doxazosin (CARDURA) 8 MG tablet Take 1 tablet by mouth Every Night. 90 tablet 3   • VIAGRA 100 MG tablet Take 100 mg by mouth Daily As  Needed.     • vitamin D (ERGOCALCIFEROL) 56752 units capsule capsule Take 1 capsule by mouth Every 7 (Seven) Days. 12 capsule 3   • [DISCONTINUED] citalopram (CELEXA) 20 MG tablet Take 1 tablet by mouth Daily. 90 tablet 3   • [DISCONTINUED] diltiaZEM (TIAZAC) 360 MG 24 hr capsule Take 1 capsule by mouth Daily. 90 capsule 3   • [DISCONTINUED] ferrous sulfate 325 (65 FE) MG EC tablet Take 1 tablet by mouth 3 (Three) Times a Day With Meals. 270 tablet 3   • [DISCONTINUED] hydrochlorothiazide (HYDRODIURIL) 50 MG tablet Take 1 tablet by mouth Daily. 90 tablet 3   • [DISCONTINUED] potassium chloride ER (K-TAB) 20 MEQ tablet controlled-release ER tablet Take 1 tablet by mouth 4 (Four) Times a Day. 360 tablet 3   • [DISCONTINUED] pravastatin (PRAVACHOL) 80 MG tablet Take 1 tablet by mouth Every Night. 90 tablet 3   • [DISCONTINUED] telmisartan (MICARDIS) 80 MG tablet Take 1 tablet by mouth Daily. 90 tablet 3     No current facility-administered medications on file prior to visit.        Review of Systems   Constitutional: Positive for activity change and fatigue. Negative for fever, irritability, malaise/fatigue, weight gain and weight loss.   HENT: Negative.    Eyes: Negative.  Negative for blurred vision.   Respiratory: Negative.  Negative for choking and shortness of breath.    Cardiovascular: Negative.  Negative for chest pain, palpitations, orthopnea and PND.   Gastrointestinal: Negative for abdominal pain, anorexia, hematemesis, hematochezia and melena.   Endocrine: Negative.    Genitourinary: Positive for impotence. Negative for menorrhagia and vaginal bleeding.   Musculoskeletal: Negative.  Negative for neck pain.   Skin: Negative.  Negative for pallor.   Allergic/Immunologic: Negative.    Neurological: Negative.  Negative for light-headedness, headaches and paresthesias.   Hematological: Negative.  Does not bruise/bleed easily.   Psychiatric/Behavioral: Positive for decreased concentration and sleep disturbance.  "Negative for confusion and suicidal ideas. The patient is not nervous/anxious and does not have insomnia.        Objective    /74   Pulse 79   Temp 98.6 °F (37 °C)   Ht 185.4 cm (73\")   Wt 103 kg (227 lb 6.4 oz)   SpO2 97%   BMI 30.00 kg/m²       /74   Pulse 79   Temp 98.6 °F (37 °C)   Ht 185.4 cm (73\")   Wt 103 kg (227 lb 6.4 oz)   SpO2 97%   BMI 30.00 kg/m²           Chemistry        Component Value Date/Time     05/08/2018 1127    K 3.8 05/08/2018 1127     05/08/2018 1127    CO2 28.0 05/08/2018 1127    BUN 19 05/08/2018 1127    CREATININE 0.94 05/08/2018 1127        Component Value Date/Time    CALCIUM 9.4 05/08/2018 1127    ALKPHOS 90 02/15/2018 1038    AST 18 02/15/2018 1038    ALT 30 02/15/2018 1038    BILITOT 0.1 (L) 02/15/2018 1038        Lab Results   Component Value Date    WBC 2.79 (L) 02/15/2018    HGB 11.8 (L) 02/15/2018    HCT 37.4 (L) 02/15/2018    MCV 91.2 02/15/2018     02/15/2018     .lastipid  Lab Results   Component Value Date    HGBA1C 5.6 11/16/2017     Lab Results   Component Value Date    TSH 2.260 11/16/2017     Office Visit on 05/07/2018   Component Date Value Ref Range Status   • Total Cholesterol 05/08/2018 168  0 - 199 mg/dL Final   • Triglycerides 05/08/2018 63  20 - 199 mg/dL Final   • HDL Cholesterol 05/08/2018 69  60 - 200 mg/dL Final   • LDL Cholesterol  05/08/2018 76  1 - 129 mg/dL Final   • LDL/HDL Ratio 05/08/2018 1.25  0.00 - 3.55 Final   • 25 Hydroxy, Vitamin D 05/08/2018 39.6  30.0 - 100.0 ng/ml Final   • Glucose 05/08/2018 87  60 - 100 mg/dL Final   • BUN 05/08/2018 19  7 - 21 mg/dL Final   • Creatinine 05/08/2018 0.94  0.70 - 1.30 mg/dL Final   • Sodium 05/08/2018 141  137 - 145 mmol/L Final   • Potassium 05/08/2018 3.8  3.5 - 5.1 mmol/L Final   • Chloride 05/08/2018 104  95 - 110 mmol/L Final   • CO2 05/08/2018 28.0  22.0 - 31.0 mmol/L Final   • Calcium 05/08/2018 9.4  8.4 - 10.2 mg/dL Final   • eGFR   Amer 05/08/2018 96  49 " - 113 mL/min/1.73 Final   • BUN/Creatinine Ratio 05/08/2018 20.2  7.0 - 25.0 Final   • Anion Gap 05/08/2018 9.0  5.0 - 15.0 mmol/L Final       Physical Exam   Constitutional: He is oriented to person, place, and time. He appears well-developed and well-nourished. No distress.   HENT:   Head: Normocephalic and atraumatic.   Right Ear: External ear normal.   Left Ear: External ear normal.   Eyes: Conjunctivae and EOM are normal. Pupils are equal, round, and reactive to light. Right eye exhibits no discharge. Left eye exhibits no discharge. No scleral icterus.   Neck: Normal range of motion. Neck supple. No JVD present. No tracheal deviation present. No thyromegaly present.   Cardiovascular: Normal rate, regular rhythm and normal heart sounds.   Pulmonary/Chest: Effort normal. No stridor.   Abdominal: Bowel sounds are normal. He exhibits no distension and no mass. There is no tenderness. There is no rebound and no guarding. No hernia.   Musculoskeletal: Normal range of motion. He exhibits no edema, tenderness or deformity.   Lymphadenopathy:     He has no cervical adenopathy.   Neurological: He is alert and oriented to person, place, and time. He has normal reflexes. No cranial nerve deficit. Coordination normal.   Skin: Skin is warm and dry. No rash noted. He is not diaphoretic. No erythema. No pallor.   Mole on lower back has stuck on appearance.  No bleeding.  Possible sebborrheic keratosis    Psychiatric: He has a normal mood and affect. His behavior is normal.   Nursing note and vitals reviewed.      Assessment/Plan   Problems Addressed this Visit        Cardiovascular and Mediastinum    Hyperlipidemia    Relevant Medications    pravastatin (PRAVACHOL) 80 MG tablet    Other Relevant Orders    Lipid Panel    Essential hypertension    Relevant Medications    telmisartan (MICARDIS) 80 MG tablet    hydrochlorothiazide (HYDRODIURIL) 50 MG tablet    diltiaZEM (TIAZAC) 360 MG 24 hr capsule    Other Relevant Orders     Comprehensive Metabolic Panel       Digestive    Vitamin D deficiency  - Primary    Obesity       Hematopoietic and Hemostatic    Iron deficiency anemia    Relevant Medications    ferrous sulfate 325 (65 FE) MG EC tablet    Other Relevant Orders    CBC Auto Differential       Other    Prediabetes    Relevant Orders    Hemoglobin A1c    Encounter for screening for endocrine disorder    Relevant Orders    TSH    T3, Free    Moderate episode of recurrent major depressive disorder (CMS/HCC)    Relevant Medications    citalopram (CELEXA) 40 MG tablet      Other Visit Diagnoses     History of rectal bleeding          -will get labwork to check thyroid, cbc, cmp and lipid panel and hga1c before next ivsit  -fatigue/sleep disorder -  Pt referred to sleep study with Dr. Bella but did not complete sleep study. Pt was given number to reschedule sleep study. He could not go to sleep..  He did not reschedule an appt   - Vitamin D deficiency - continue vitamin D pill. Recheck vitamin D levels   - Essential hypertension - continue  HCTZ  50 mg PO q daily.  Continue micardis 80 mg daily , cardura. BP at goal today continue tiazac. BP at goal today   -obesity -weight loss information provided  - hyperlipidemia -Continue pravastatin.  He is not taking aspirin   -rectal ulcer/diverticulosis - finished mesalamine suppository 1000 mg rectal at bedtime.  Recommend high fiber diet. Has followup appt with Gastroenterologist as needed   -depression - pt is back on celexa but will go up on celexa from 20 to 40 mg dialy Pt declines counseling. He has a female  in [place], [place].     -Anemia - Hematologist following . Iron deficiency anemia. Continue iron pill TID. Hematology following.  Have last Hematologist office note.   May need to need iron therapy  - For mole on back.  Has stuck on appearance. Consider cryotherapy or shave biopsy in the future. Refferral to Dr. Neves for further evaluation was made but mole came off before  appt.   -Prevnar 13 vaccination prescription given Pharmacy to give  -recheck in 2 months           This document has been electronically signed by Abel Hoffman MD on December 12, 2018 2:48 PM                 Review of Systems   Constitutional: Positive for activity change and fatigue. Negative for fever, irritability, malaise/fatigue, unexpected weight gain and unexpected weight loss.   HENT: Negative.    Eyes: Negative.  Negative for blurred vision.   Respiratory: Negative.  Negative for choking and shortness of breath.    Cardiovascular: Negative.  Negative for chest pain, palpitations, orthopnea and PND.   Gastrointestinal: Negative for abdominal pain, anorexia, hematemesis, hematochezia and melena.   Endocrine: Negative.    Genitourinary: Positive for impotence. Negative for menorrhagia and vaginal bleeding.   Musculoskeletal: Negative.  Negative for neck pain.   Skin: Negative.  Negative for pallor.   Allergic/Immunologic: Negative.    Neurological: Negative.  Negative for light-headedness, paresthesias and confusion.   Hematological: Negative.  Does not bruise/bleed easily.   Psychiatric/Behavioral: Positive for decreased concentration, sleep disturbance and depressed mood. Negative for suicidal ideas. The patient is not nervous/anxious and does not have insomnia.        Physical Exam   Constitutional: He is oriented to person, place, and time. He appears well-developed and well-nourished. No distress.   HENT:   Head: Normocephalic and atraumatic.   Right Ear: External ear normal.   Left Ear: External ear normal.   Eyes: Conjunctivae and EOM are normal. Pupils are equal, round, and reactive to light. Right eye exhibits no discharge. Left eye exhibits no discharge. No scleral icterus.   Neck: Normal range of motion. Neck supple. No JVD present. No tracheal deviation present. No thyromegaly present.   Cardiovascular: Normal rate, regular rhythm and normal heart sounds.   Pulmonary/Chest: Effort normal. No  stridor.   Abdominal: Bowel sounds are normal. He exhibits no distension and no mass. There is no tenderness. There is no rebound and no guarding. No hernia.   Musculoskeletal: Normal range of motion. He exhibits no edema, tenderness or deformity.   Lymphadenopathy:     He has no cervical adenopathy.   Neurological: He is alert and oriented to person, place, and time. He has normal reflexes. No cranial nerve deficit. Coordination normal.   Skin: Skin is warm and dry. No rash noted. He is not diaphoretic. No erythema. No pallor.   Mole on lower back has stuck on appearance.  No bleeding.  Possible sebborrheic keratosis    Psychiatric: He has a normal mood and affect. His behavior is normal.   Nursing note and vitals reviewed.

## 2018-12-12 ENCOUNTER — OFFICE VISIT (OUTPATIENT)
Dept: FAMILY MEDICINE CLINIC | Facility: CLINIC | Age: 68
End: 2018-12-12

## 2018-12-12 ENCOUNTER — APPOINTMENT (OUTPATIENT)
Dept: LAB | Facility: HOSPITAL | Age: 68
End: 2018-12-12

## 2018-12-12 VITALS
HEART RATE: 79 BPM | OXYGEN SATURATION: 97 % | SYSTOLIC BLOOD PRESSURE: 128 MMHG | WEIGHT: 227.4 LBS | DIASTOLIC BLOOD PRESSURE: 74 MMHG | BODY MASS INDEX: 30.14 KG/M2 | HEIGHT: 73 IN | TEMPERATURE: 98.6 F

## 2018-12-12 DIAGNOSIS — I10 ESSENTIAL HYPERTENSION: ICD-10-CM

## 2018-12-12 DIAGNOSIS — E55.9 VITAMIN D DEFICIENCY: Primary | ICD-10-CM

## 2018-12-12 DIAGNOSIS — E66.9 OBESITY, UNSPECIFIED CLASSIFICATION, UNSPECIFIED OBESITY TYPE, UNSPECIFIED WHETHER SERIOUS COMORBIDITY PRESENT: ICD-10-CM

## 2018-12-12 DIAGNOSIS — Z87.19 HISTORY OF RECTAL BLEEDING: ICD-10-CM

## 2018-12-12 DIAGNOSIS — E78.5 HYPERLIPIDEMIA, UNSPECIFIED HYPERLIPIDEMIA TYPE: ICD-10-CM

## 2018-12-12 DIAGNOSIS — F33.1 MODERATE EPISODE OF RECURRENT MAJOR DEPRESSIVE DISORDER (HCC): ICD-10-CM

## 2018-12-12 DIAGNOSIS — R73.03 PREDIABETES: ICD-10-CM

## 2018-12-12 DIAGNOSIS — Z13.29 ENCOUNTER FOR SCREENING FOR ENDOCRINE DISORDER: ICD-10-CM

## 2018-12-12 DIAGNOSIS — D50.9 IRON DEFICIENCY ANEMIA, UNSPECIFIED IRON DEFICIENCY ANEMIA TYPE: ICD-10-CM

## 2018-12-12 PROCEDURE — 99214 OFFICE O/P EST MOD 30 MIN: CPT | Performed by: FAMILY MEDICINE

## 2018-12-12 RX ORDER — LANOLIN ALCOHOL/MO/W.PET/CERES
325 CREAM (GRAM) TOPICAL
Qty: 270 TABLET | Refills: 3 | Status: SHIPPED | OUTPATIENT
Start: 2018-12-12 | End: 2019-12-12

## 2018-12-12 RX ORDER — HYDROCHLOROTHIAZIDE 50 MG/1
50 TABLET ORAL DAILY
Qty: 90 TABLET | Refills: 3 | Status: SHIPPED | OUTPATIENT
Start: 2018-12-12 | End: 2019-07-24 | Stop reason: SDUPTHER

## 2018-12-12 RX ORDER — PRAVASTATIN SODIUM 80 MG/1
80 TABLET ORAL NIGHTLY
Qty: 90 TABLET | Refills: 3 | Status: SHIPPED | OUTPATIENT
Start: 2018-12-12 | End: 2019-07-24 | Stop reason: SDUPTHER

## 2018-12-12 RX ORDER — TELMISARTAN 80 MG/1
80 TABLET ORAL DAILY
Qty: 90 TABLET | Refills: 3 | Status: SHIPPED | OUTPATIENT
Start: 2018-12-12 | End: 2019-05-23 | Stop reason: SDUPTHER

## 2018-12-12 RX ORDER — POTASSIUM CHLORIDE 1500 MG/1
20 TABLET, FILM COATED, EXTENDED RELEASE ORAL 4 TIMES DAILY
Qty: 360 TABLET | Refills: 3 | Status: SHIPPED | OUTPATIENT
Start: 2018-12-12 | End: 2019-07-24 | Stop reason: SDUPTHER

## 2018-12-12 RX ORDER — DILTIAZEM HYDROCHLORIDE 360 MG/1
360 CAPSULE, EXTENDED RELEASE ORAL DAILY
Qty: 90 CAPSULE | Refills: 3 | Status: SHIPPED | OUTPATIENT
Start: 2018-12-12 | End: 2019-07-24 | Stop reason: SDUPTHER

## 2018-12-12 RX ORDER — CITALOPRAM 40 MG/1
40 TABLET ORAL DAILY
Qty: 30 TABLET | Refills: 3 | Status: SHIPPED | OUTPATIENT
Start: 2018-12-12 | End: 2019-05-23 | Stop reason: SDUPTHER

## 2018-12-12 NOTE — PATIENT INSTRUCTIONS
Go up on celexa from 20 to 40 mg daily. Take 2 pills of celexa 20 mg = 40 mg dialy    Continue followup with Dr. Beth regarding anemia    Get labwork before next visit

## 2019-01-14 ENCOUNTER — OFFICE VISIT (OUTPATIENT)
Dept: FAMILY MEDICINE CLINIC | Facility: CLINIC | Age: 69
End: 2019-01-14

## 2019-01-14 VITALS
HEIGHT: 73 IN | OXYGEN SATURATION: 99 % | DIASTOLIC BLOOD PRESSURE: 72 MMHG | TEMPERATURE: 97.8 F | SYSTOLIC BLOOD PRESSURE: 132 MMHG | BODY MASS INDEX: 29.61 KG/M2 | WEIGHT: 223.4 LBS | HEART RATE: 63 BPM

## 2019-01-14 DIAGNOSIS — Z00.00 MEDICARE ANNUAL WELLNESS VISIT, SUBSEQUENT: Primary | ICD-10-CM

## 2019-01-14 PROCEDURE — G0439 PPPS, SUBSEQ VISIT: HCPCS | Performed by: FAMILY MEDICINE

## 2019-01-14 NOTE — PATIENT INSTRUCTIONS
Fall Prevention in the Home  Falls can cause injuries and can affect people from all age groups. There are many simple things that you can do to make your home safe and to help prevent falls.  What can I do on the outside of my home?  · Regularly repair the edges of walkways and driveways and fix any cracks.  · Remove high doorway thresholds.  · Trim any shrubbery on the main path into your home.  · Use bright outdoor lighting.  · Clear walkways of debris and clutter, including tools and rocks.  · Regularly check that handrails are securely fastened and in good repair. Both sides of any steps should have handrails.  · Install guardrails along the edges of any raised decks or porches.  · Have leaves, snow, and ice cleared regularly.  · Use sand or salt on walkways during winter months.  · In the garage, clean up any spills right away, including grease or oil spills.  What can I do in the bathroom?  · Use night lights.  · Install grab bars by the toilet and in the tub and shower. Do not use towel bars as grab bars.  · Use non-skid mats or decals on the floor of the tub or shower.  · If you need to sit down while you are in the shower, use a plastic, non-slip stool.  · Keep the floor dry. Immediately clean up any water that spills on the floor.  · Remove soap buildup in the tub or shower on a regular basis.  · Attach bath mats securely with double-sided non-slip rug tape.  · Remove throw rugs and other tripping hazards from the floor.  What can I do in the bedroom?  · Use night lights.  · Make sure that a bedside light is easy to reach.  · Do not use oversized bedding that drapes onto the floor.  · Have a firm chair that has side arms to use for getting dressed.  · Remove throw rugs and other tripping hazards from the floor.  What can I do in the kitchen?  · Clean up any spills right away.  · Avoid walking on wet floors.  · Place frequently used items in easy-to-reach places.  · If you need to reach for something above  you, use a sturdy step stool that has a grab bar.  · Keep electrical cables out of the way.  · Do not use floor polish or wax that makes floors slippery. If you have to use wax, make sure that it is non-skid floor wax.  · Remove throw rugs and other tripping hazards from the floor.  What can I do in the stairways?  · Do not leave any items on the stairs.  · Make sure that there are handrails on both sides of the stairs. Fix handrails that are broken or loose. Make sure that handrails are as long as the stairways.  · Check any carpeting to make sure that it is firmly attached to the stairs. Fix any carpet that is loose or worn.  · Avoid having throw rugs at the top or bottom of stairways, or secure the rugs with carpet tape to prevent them from moving.  · Make sure that you have a light switch at the top of the stairs and the bottom of the stairs. If you do not have them, have them installed.  What are some other fall prevention tips?  · Wear closed-toe shoes that fit well and support your feet. Wear shoes that have rubber soles or low heels.  · When you use a stepladder, make sure that it is completely opened and that the sides are firmly locked. Have someone hold the ladder while you are using it. Do not climb a closed stepladder.  · Add color or contrast paint or tape to grab bars and handrails in your home. Place contrasting color strips on the first and last steps.  · Use mobility aids as needed, such as canes, walkers, scooters, and crutches.  · Turn on lights if it is dark. Replace any light bulbs that burn out.  · Set up furniture so that there are clear paths. Keep the furniture in the same spot.  · Fix any uneven floor surfaces.  · Choose a carpet design that does not hide the edge of steps of a stairway.  · Be aware of any and all pets.  · Review your medicines with your healthcare provider. Some medicines can cause dizziness or changes in blood pressure, which increase your risk of falling.  Talk with  your health care provider about other ways that you can decrease your risk of falls. This may include working with a physical therapist or  to improve your strength, balance, and endurance.  This information is not intended to replace advice given to you by your health care provider. Make sure you discuss any questions you have with your health care provider.  Document Released: 12/08/2003 Document Revised: 05/16/2017 Document Reviewed: 01/22/2016  [employer] Interactive Patient Education © 2018 [employer]  Exercising to Stay Healthy  Exercising regularly is important. It has many health benefits, such as:  · Improving your overall fitness, flexibility, and endurance.  · Increasing your bone density.  · Helping with weight control.  · Decreasing your body fat.  · Increasing your muscle strength.  · Reducing stress and tension.  · Improving your overall health.    In order to become healthy and stay healthy, it is recommended that you do moderate-intensity and vigorous-intensity exercise. You can tell that you are exercising at a moderate intensity if you have a higher heart rate and faster breathing, but you are still able to hold a conversation. You can tell that you are exercising at a vigorous intensity if you are breathing much harder and faster and cannot hold a conversation while exercising.  How often should I exercise?  Choose an activity that you enjoy and set realistic goals. Your health care provider can help you to make an activity plan that works for you. Exercise regularly as directed by your health care provider. This may include:  · Doing resistance training twice each week, such as:  ? Push-ups.  ? Sit-ups.  ? Lifting weights.  ? Using resistance bands.  · Doing a given intensity of exercise for a given amount of time. Choose from these options:  ? 150 minutes of moderate-intensity exercise every week.  ? 75 minutes of vigorous-intensity exercise every week.  ? A mix of moderate-intensity  and vigorous-intensity exercise every week.    Children, pregnant women, people who are out of shape, people who are overweight, and older adults may need to consult a health care provider for individual recommendations. If you have any sort of medical condition, be sure to consult your health care provider before starting a new exercise program.  What are some exercise ideas?  Some moderate-intensity exercise ideas include:  · Walking at a rate of 1 mile in 15 minutes.  · Biking.  · Hiking.  · Golfing.  · Dancing.    Some vigorous-intensity exercise ideas include:  · Walking at a rate of at least 4.5 miles per hour.  · Jogging or running at a rate of 5 miles per hour.  · Biking at a rate of at least 10 miles per hour.  · Lap swimming.  · Roller-skating or in-line skating.  · Cross-country skiing.  · Vigorous competitive sports, such as football, basketball, and soccer.  · Jumping rope.  · Aerobic dancing.    What are some everyday activities that can help me to get exercise?  · Yard work, such as:  ? Pushing a .  ? Raking and bagging leaves.  · Washing and waxing your car.  · Pushing a stroller.  · Shoveling snow.  · Gardening.  · Washing windows or floors.  How can I be more active in my day-to-day activities?  · Use the stairs instead of the elevator.  · Take a walk during your lunch break.  · If you drive, park your car farther away from work or school.  · If you take public transportation, get off one stop early and walk the rest of the way.  · Make all of your phone calls while standing up and walking around.  · Get up, stretch, and walk around every 30 minutes throughout the day.  What guidelines should I follow while exercising?  · Do not exercise so much that you hurt yourself, feel dizzy, or get very short of breath.  · Consult your health care provider before starting a new exercise program.  · Wear comfortable clothes and shoes with good support.  · Drink plenty of water while you exercise to  prevent dehydration or heat stroke. Body water is lost during exercise and must be replaced.  · Work out until you breathe faster and your heart beats faster.  This information is not intended to replace advice given to you by your health care provider. Make sure you discuss any questions you have with your health care provider.  Document Released: 01/20/2012 Document Revised: 05/25/2017 Document Reviewed: 05/21/2015  [employer] Interactive Patient Education © 2018 [employer]  Calorie Counting for Weight Loss  Calories are units of energy. Your body needs a certain amount of calories from food to keep you going throughout the day. When you eat more calories than your body needs, your body stores the extra calories as fat. When you eat fewer calories than your body needs, your body burns fat to get the energy it needs.  Calorie counting means keeping track of how many calories you eat and drink each day. Calorie counting can be helpful if you need to lose weight. If you make sure to eat fewer calories than your body needs, you should lose weight. Ask your health care provider what a healthy weight is for you.  For calorie counting to work, you will need to eat the right number of calories in a day in order to lose a healthy amount of weight per week. A dietitian can help you determine how many calories you need in a day and will give you suggestions on how to reach your calorie goal.  · A healthy amount of weight to lose per week is usually 1-2 lb (0.5-0.9 kg). This usually means that your daily calorie intake should be reduced by 500-750 calories.  · Eating 1,200 - 1,500 calories per day can help most women lose weight.  · Eating 1,500 - 1,800 calories per day can help most men lose weight.    What do I need to know about calorie counting?  In order to meet your daily calorie goal, you will need to:  · Find out how many calories are in each food you would like to eat. Try to do this before you eat.  · Decide how  much of the food you plan to eat.  · Write down what you ate and how many calories it had. Doing this is called keeping a food log.    To successfully lose weight, it is important to balance calorie counting with a healthy lifestyle that includes regular activity. Aim for 150 minutes of moderate exercise (such as walking) or 75 minutes of vigorous exercise (such as running) each week.  Where do I find calorie information?    The number of calories in a food can be found on a Nutrition Facts label. If a food does not have a Nutrition Facts label, try to look up the calories online or ask your dietitian for help.  Remember that calories are listed per serving. If you choose to have more than one serving of a food, you will have to multiply the calories per serving by the amount of servings you plan to eat. For example, the label on a package of bread might say that a serving size is 1 slice and that there are 90 calories in a serving. If you eat 1 slice, you will have eaten 90 calories. If you eat 2 slices, you will have eaten 180 calories.  How do I keep a food log?  Immediately after each meal, record the following information in your food log:  · What you ate. Don't forget to include toppings, sauces, and other extras on the food.  · How much you ate. This can be measured in cups, ounces, or number of items.  · How many calories each food and drink had.  · The total number of calories in the meal.    Keep your food log near you, such as in a small notebook in your pocket, or use a mobile page or website. Some programs will calculate calories for you and show you how many calories you have left for the day to meet your goal.  What are some calorie counting tips?  · Use your calories on foods and drinks that will fill you up and not leave you hungry:  ? Some examples of foods that fill you up are nuts and nut butters, vegetables, lean proteins, and high-fiber foods like whole grains. High-fiber foods are foods with  "more than 5 g fiber per serving.  ? Drinks such as sodas, specialty coffee drinks, alcohol, and juices have a lot of calories, yet do not fill you up.  · Eat nutritious foods and avoid empty calories. Empty calories are calories you get from foods or beverages that do not have many vitamins or protein, such as candy, sweets, and soda. It is better to have a nutritious high-calorie food (such as an avocado) than a food with few nutrients (such as a bag of chips).  · Know how many calories are in the foods you eat most often. This will help you calculate calorie counts faster.  · Pay attention to calories in drinks. Low-calorie drinks include water and unsweetened drinks.  · Pay attention to nutrition labels for \"low fat\" or \"fat free\" foods. These foods sometimes have the same amount of calories or more calories than the full fat versions. They also often have added sugar, starch, or salt, to make up for flavor that was removed with the fat.  · Find a way of tracking calories that works for you. Get creative. Try different apps or programs if writing down calories does not work for you.  What are some portion control tips?  · Know how many calories are in a serving. This will help you know how many servings of a certain food you can have.  · Use a measuring cup to measure serving sizes. You could also try weighing out portions on a kitchen scale. With time, you will be able to estimate serving sizes for some foods.  · Take some time to put servings of different foods on your favorite plates, bowls, and cups so you know what a serving looks like.  · Try not to eat straight from a bag or box. Doing this can lead to overeating. Put the amount you would like to eat in a cup or on a plate to make sure you are eating the right portion.  · Use smaller plates, glasses, and bowls to prevent overeating.  · Try not to multitask (for example, watch TV or use your computer) while eating. If it is time to eat, sit down at a table " and enjoy your food. This will help you to know when you are full. It will also help you to be aware of what you are eating and how much you are eating.  What are tips for following this plan?  Reading food labels  · Check the calorie count compared to the serving size. The serving size may be smaller than what you are used to eating.  · Check the source of the calories. Make sure the food you are eating is high in vitamins and protein and low in saturated and trans fats.  Shopping  · Read nutrition labels while you shop. This will help you make healthy decisions before you decide to purchase your food.  · Make a grocery list and stick to it.  Cooking  · Try to cook your favorite foods in a healthier way. For example, try baking instead of frying.  · Use low-fat dairy products.  Meal planning  · Use more fruits and vegetables. Half of your plate should be fruits and vegetables.  · Include lean proteins like poultry and fish.  How do I count calories when eating out?  · Ask for smaller portion sizes.  · Consider sharing an entree and sides instead of getting your own entree.  · If you get your own entree, eat only half. Ask for a box at the beginning of your meal and put the rest of your entree in it so you are not tempted to eat it.  · If calories are listed on the menu, choose the lower calorie options.  · Choose dishes that include vegetables, fruits, whole grains, low-fat dairy products, and lean protein.  · Choose items that are boiled, broiled, grilled, or steamed. Stay away from items that are buttered, battered, fried, or served with cream sauce. Items labeled “crispy” are usually fried, unless stated otherwise.  · Choose water, low-fat milk, unsweetened iced tea, or other drinks without added sugar. If you want an alcoholic beverage, choose a lower calorie option such as a glass of wine or light beer.  · Ask for dressings, sauces, and syrups on the side. These are usually high in calories, so you should  limit the amount you eat.  · If you want a salad, choose a garden salad and ask for grilled meats. Avoid extra toppings like kenney, cheese, or fried items. Ask for the dressing on the side, or ask for olive oil and vinegar or lemon to use as dressing.  · Estimate how many servings of a food you are given. For example, a serving of cooked rice is ½ cup or about the size of half a baseball. Knowing serving sizes will help you be aware of how much food you are eating at restaurants. The list below tells you how big or small some common portion sizes are based on everyday objects:  ? 1 oz--4 stacked dice.  ? 3 oz--1 deck of cards.  ? 1 tsp--1 die.  ? 1 Tbsp--½ a ping-pong ball.  ? 2 Tbsp--1 ping-pong ball.  ? ½ cup--½ baseball.  ? 1 cup--1 baseball.  Summary  · Calorie counting means keeping track of how many calories you eat and drink each day. If you eat fewer calories than your body needs, you should lose weight.  · A healthy amount of weight to lose per week is usually 1-2 lb (0.5-0.9 kg). This usually means reducing your daily calorie intake by 500-750 calories.  · The number of calories in a food can be found on a Nutrition Facts label. If a food does not have a Nutrition Facts label, try to look up the calories online or ask your dietitian for help.  · Use your calories on foods and drinks that will fill you up, and not on foods and drinks that will leave you hungry.  · Use smaller plates, glasses, and bowls to prevent overeating.  This information is not intended to replace advice given to you by your health care provider. Make sure you discuss any questions you have with your health care provider.  Document Released: 12/18/2006 Document Revised: 11/17/2017 Document Reviewed: 11/17/2017  [employer] Interactive Patient Education © 2018 [employer]  Preventive Care 65 Years and Older, Male  Preventive care refers to lifestyle choices and visits with your health care provider that can promote health and  wellness.  What does preventive care include?  · A yearly physical exam. This is also called an annual well check.  · Dental exams once or twice a year.  · Routine eye exams. Ask your health care provider how often you should have your eyes checked.  · Personal lifestyle choices, including:  ? Daily care of your teeth and gums.  ? Regular physical activity.  ? Eating a healthy diet.  ? Avoiding tobacco and drug use.  ? Limiting alcohol use.  ? Practicing safe sex.  ? Taking low doses of aspirin every day.  ? Taking vitamin and mineral supplements as recommended by your health care provider.  What happens during an annual well check?  The services and screenings done by your health care provider during your annual well check will depend on your age, overall health, lifestyle risk factors, and family history of disease.  Counseling  Your health care provider may ask you questions about your:  · Alcohol use.  · Tobacco use.  · Drug use.  · Emotional well-being.  · Home and relationship well-being.  · Sexual activity.  · Eating habits.  · History of falls.  · Memory and ability to understand (cognition).  · Work and work environment.    Screening  You may have the following tests or measurements:  · Height, weight, and BMI.  · Blood pressure.  · Lipid and cholesterol levels. These may be checked every 5 years, or more frequently if you are over 50 years old.  · Skin check.  · Lung cancer screening. You may have this screening every year starting at age 55 if you have a 30-pack-year history of smoking and currently smoke or have quit within the past 15 years.  · Fecal occult blood test (FOBT) of the stool. You may have this test every year starting at age 50.  · Flexible sigmoidoscopy or colonoscopy. You may have a sigmoidoscopy every 5 years or a colonoscopy every 10 years starting at age 50.  · Prostate cancer screening. Recommendations will vary depending on your family history and other risks.  · Hepatitis C blood  test.  · Hepatitis B blood test.  · Sexually transmitted disease (STD) testing.  · Diabetes screening. This is done by checking your blood sugar (glucose) after you have not eaten for a while (fasting). You may have this done every 1-3 years.  · Abdominal aortic aneurysm (AAA) screening. You may need this if you are a current or former smoker.  · Osteoporosis. You may be screened starting at age 70 if you are at high risk.    Talk with your health care provider about your test results, treatment options, and if necessary, the need for more tests.  Vaccines  Your health care provider may recommend certain vaccines, such as:  · Influenza vaccine. This is recommended every year.  · Tetanus, diphtheria, and acellular pertussis (Tdap, Td) vaccine. You may need a Td booster every 10 years.  · Varicella vaccine. You may need this if you have not been vaccinated.  · Zoster vaccine. You may need this after age 60.  · Measles, mumps, and rubella (MMR) vaccine. You may need at least one dose of MMR if you were born in 1957 or later. You may also need a second dose.  · Pneumococcal 13-valent conjugate (PCV13) vaccine. One dose is recommended after age 65.  · Pneumococcal polysaccharide (PPSV23) vaccine. One dose is recommended after age 65.  · Meningococcal vaccine. You may need this if you have certain conditions.  · Hepatitis A vaccine. You may need this if you have certain conditions or if you travel or work in places where you may be exposed to hepatitis A.  · Hepatitis B vaccine. You may need this if you have certain conditions or if you travel or work in places where you may be exposed to hepatitis B.  · Haemophilus influenzae type b (Hib) vaccine. You may need this if you have certain risk factors.    Talk to your health care provider about which screenings and vaccines you need and how often you need them.  This information is not intended to replace advice given to you by your health care provider. Make sure you  discuss any questions you have with your health care provider.  Document Released: 2017 Document Revised: 2017 Document Reviewed: 10/18/2016  [employer] Interactive Patient Education © 2018 [employer]    Medicare Wellness  Personal Prevention Plan of Service     Date of Office Visit:  2019  Encounter Provider:  Narinder Metcalf MD  Place of Service:  [facility]  Patient Name: German Ray  :  1950    As part of the Medicare Wellness portion of your visit today, we are providing you with this personalized preventive plan of services (PPPS). This plan is based upon recommendations of the [place] Preventive Services Task Force (USPSTF) and the Advisory Committee on Immunization Practices (ACIP).    This lists the preventive care services that should be considered, and provides dates of when you are due. Items listed as completed are up-to-date and do not require any further intervention.    Health Maintenance   Topic Date Due   • ZOSTER VACCINE (2 of 2) 2013   • PNEUMOCOCCAL VACCINES (65+ LOW/MEDIUM RISK) (2 of 2 - PCV13) 2018   • HEPATITIS A VACCINE ADULT (1 of 2) 2020 (Originally 1968)   • LIPID PANEL  2019   • MEDICARE ANNUAL WELLNESS  2020   • COLONOSCOPY  10/19/2022   • TDAP/TD VACCINES (2 - Td) 2027   • HEPATITIS C SCREENING  Completed   • INFLUENZA VACCINE  Addressed       No orders of the defined types were placed in this encounter.      Return in about 1 year (around 2020) for Medicare Wellness subsequent.

## 2019-01-14 NOTE — PROGRESS NOTES
QUICK REFERENCE INFORMATION:  The ABCs of the Annual Wellness Visit    Subsequent Medicare Wellness Visit    HEALTH RISK ASSESSMENT    1950    Recent Hospitalizations:  No hospitalization(s) within the last year..        Current Medical Providers:  Patient Care Team:  Abel Hoffman MD as PCP - General  Fox Beth MD as Consulting Physician (Medical Oncology)        Smoking Status:  Social History     Tobacco Use   Smoking Status Never Smoker   Smokeless Tobacco Never Used       Alcohol Consumption:  Social History     Substance and Sexual Activity   Alcohol Use Yes    Comment: 08/30/2018 - Patient reports occasional consumption of alcoholic beverages.       Depression Screen:   PHQ-2/PHQ-9 Depression Screening 1/14/2019   Little interest or pleasure in doing things 0   Feeling down, depressed, or hopeless 1   Total Score 1       Health Habits and Functional and Cognitive Screening:  Functional & Cognitive Status 1/14/2019   Do you have difficulty preparing food and eating? No   Do you have difficulty bathing yourself, getting dressed or grooming yourself? No   Do you have difficulty using the toilet? No   Do you have difficulty moving around from place to place? No   Do you have trouble with steps or getting out of a bed or a chair? No   In the past year have you fallen or experienced a near fall? No   Current Diet Well Balanced Diet   Dental Exam Up to date   Eye Exam Not up to date   Exercise (times per week) 1 times per week   Current Exercise Activities Include Walking   Do you need help using the phone?  No   Are you deaf or do you have serious difficulty hearing?  No   Do you need help with transportation? No   Do you need help shopping? No   Do you need help preparing meals?  No   Do you need help with housework?  No   Do you need help with laundry? No   Do you need help taking your medications? No   Do you need help managing money? No   Do you ever drive or ride in a car without wearing a  seat belt? No   Have you felt unusual stress, anger or loneliness in the last month? No   Who do you live with? Alone   If you need help, do you have trouble finding someone available to you? No   Have you been bothered in the last four weeks by sexual problems? No   Do you have difficulty concentrating, remembering or making decisions? No           Does the patient have evidence of cognitive impairment? No    Aspirin use counseling: Does not need ASA (and currently is not on it)      Recent Lab Results:  CMP:  Lab Results   Component Value Date    BUN 19 05/08/2018    CREATININE 0.94 05/08/2018    EGFRIFAFRI 96 05/08/2018    BCR 20.2 05/08/2018     05/08/2018    K 3.8 05/08/2018    CO2 28.0 05/08/2018    CALCIUM 9.4 05/08/2018    ALBUMIN 3.60 02/15/2018    BILITOT 0.1 (L) 02/15/2018    ALKPHOS 90 02/15/2018    AST 18 02/15/2018    ALT 30 02/15/2018     Lipid Panel:  Lab Results   Component Value Date    CHOL 168 05/08/2018    TRIG 63 05/08/2018    HDL 69 05/08/2018    LDLHDL 1.25 05/08/2018     HbA1c:  Lab Results   Component Value Date    HGBA1C 5.6 11/16/2017       Visual Acuity:  No exam data present    Age-appropriate Screening Schedule:  Refer to the list below for future screening recommendations based on patient's age, sex and/or medical conditions. Orders for these recommended tests are listed in the plan section. The patient has been provided with a written plan.    Health Maintenance   Topic Date Due   • PNEUMOCOCCAL VACCINES (65+ LOW/MEDIUM RISK) (2 of 2 - PCV13) 01/31/2020 (Originally 3/31/2018)   • ZOSTER VACCINE (2 of 2) 01/31/2020 (Originally 2/26/2013)   • LIPID PANEL  05/08/2019   • COLONOSCOPY  10/19/2022   • TDAP/TD VACCINES (2 - Td) 03/31/2027   • INFLUENZA VACCINE  Addressed        Subjective   History of Present Illness    German Ray is a 68 y.o. male who presents for an Subsequent Wellness Visit.    The following portions of the patient's history were reviewed and updated  as appropriate: allergies, current medications, past family history, past medical history, past social history, past surgical history and problem list.    Outpatient Medications Prior to Visit   Medication Sig Dispense Refill   • calcium carbonate-vitamin d 600-400 MG-UNIT per tablet Take 1 tablet by mouth Daily. 30 tablet 3   • citalopram (CELEXA) 40 MG tablet Take 1 tablet by mouth Daily. 30 tablet 3   • diltiaZEM (TIAZAC) 360 MG 24 hr capsule Take 1 capsule by mouth Daily. 90 capsule 3   • doxazosin (CARDURA) 8 MG tablet Take 1 tablet by mouth Every Night. 90 tablet 3   • ferrous sulfate 325 (65 FE) MG EC tablet Take 1 tablet by mouth 3 (Three) Times a Day With Meals. 270 tablet 3   • hydrochlorothiazide (HYDRODIURIL) 50 MG tablet Take 1 tablet by mouth Daily. 90 tablet 3   • potassium chloride ER (K-TAB) 20 MEQ tablet controlled-release ER tablet Take 1 tablet by mouth 4 (Four) Times a Day. 360 tablet 3   • pravastatin (PRAVACHOL) 80 MG tablet Take 1 tablet by mouth Every Night. 90 tablet 3   • telmisartan (MICARDIS) 80 MG tablet Take 1 tablet by mouth Daily. 90 tablet 3   • VIAGRA 100 MG tablet Take 100 mg by mouth Daily As Needed.     • vitamin D (ERGOCALCIFEROL) 64439 units capsule capsule Take 1 capsule by mouth Every 7 (Seven) Days. 12 capsule 3     No facility-administered medications prior to visit.        Patient Active Problem List   Diagnosis   • Hyperlipidemia   • Impaired fasting glucose    • Vitamin D deficiency    • Depression   • Bereavement   • Anemia   • Iron deficiency anemia   • Need for hepatitis C screening test   • Palpable mass of neck   • Encounter for immunization   • Radiation proctitis   • Diverticulosis of large intestine without hemorrhage   • Lower GI bleeding   • Pruritic rash   • Lipoma of back   • Essential hypertension   • Erectile dysfunction   • Need for vaccination   • Medicare annual wellness visit, initial   • Rectal bleeding   • Change in bowel habits   • General medical  "examination   • Sleep disorder   • Other fatigue   • Obesity   • Prediabetes   • Encounter for screening for endocrine disorder   • Moderate episode of recurrent major depressive disorder (CMS/[facility])       Advance Care Planning:  has an advance directive - a copy HAS NOT been provided    Identification of Risk Factors:  Risk factors include: weight , unhealthy diet, inactivity, increased fall risk and depression.    Review of Systems    Compared to one year ago, the patient feels his physical health is the same.  Compared to one year ago, the patient feels his mental health is the same.    Objective     Physical Exam    Vitals:    01/14/19 0912   BP: 132/72   BP Location: Right arm   Patient Position: Sitting   Cuff Size: Adult   Pulse: 63   Temp: 97.8 °F (36.6 °C)   TempSrc: Oral   SpO2: 99%   Weight: 101 kg (223 lb 6.4 oz)   Height: 185.4 cm (73\")   PainSc: 0-No pain       Patient's Body mass index is 29.47 kg/m². BMI is above normal parameters. Recommendations include: educational material, exercise counseling, nutrition counseling and referral to primary care.      Assessment/Plan   Patient Self-Management and Personalized Health Advice  The patient has been provided with information about: diet, exercise, weight management, fall prevention, supplements and mental health concerns and preventive services including:   · Fall Risk assessment done, Fall Risk plan of care done, Influenza vaccine, Nutrition counseling provided, Pneumococcal vaccine , TdaP vaccine, Shingrix.    Visit Diagnoses:    ICD-10-CM ICD-9-CM   1. Medicare annual wellness visit, subsequent Z00.00 V70.0       No orders of the defined types were placed in this encounter.      Outpatient Encounter Medications as of 1/14/2019   Medication Sig Dispense Refill   • calcium carbonate-vitamin d 600-400 MG-UNIT per tablet Take 1 tablet by mouth Daily. 30 tablet 3   • citalopram (CELEXA) 40 MG tablet Take 1 tablet by mouth Daily. 30 tablet 3   • diltiaZEM " (TIAZAC) 360 MG 24 hr capsule Take 1 capsule by mouth Daily. 90 capsule 3   • doxazosin (CARDURA) 8 MG tablet Take 1 tablet by mouth Every Night. 90 tablet 3   • ferrous sulfate 325 (65 FE) MG EC tablet Take 1 tablet by mouth 3 (Three) Times a Day With Meals. 270 tablet 3   • hydrochlorothiazide (HYDRODIURIL) 50 MG tablet Take 1 tablet by mouth Daily. 90 tablet 3   • potassium chloride ER (K-TAB) 20 MEQ tablet controlled-release ER tablet Take 1 tablet by mouth 4 (Four) Times a Day. 360 tablet 3   • pravastatin (PRAVACHOL) 80 MG tablet Take 1 tablet by mouth Every Night. 90 tablet 3   • telmisartan (MICARDIS) 80 MG tablet Take 1 tablet by mouth Daily. 90 tablet 3   • VIAGRA 100 MG tablet Take 100 mg by mouth Daily As Needed.     • vitamin D (ERGOCALCIFEROL) 37860 units capsule capsule Take 1 capsule by mouth Every 7 (Seven) Days. 12 capsule 3     No facility-administered encounter medications on file as of 1/14/2019.        Reviewed use of high risk medication in the elderly: yes  Reviewed for potential of harmful drug interactions in the elderly: yes    Follow Up:  Return in about 1 year (around 1/14/2020) for Medicare Wellness subsequent.     An After Visit Summary and PPPS with all of these plans were given to the patient.       Pt has drafted a action plan for healthier lifestyle in coming year, please see scanned documents for complete visit.          This document has been electronically signed by Narinder Metcalf MD

## 2019-05-23 ENCOUNTER — OFFICE VISIT (OUTPATIENT)
Dept: FAMILY MEDICINE CLINIC | Facility: CLINIC | Age: 69
End: 2019-05-23

## 2019-05-23 VITALS
BODY MASS INDEX: 28.92 KG/M2 | TEMPERATURE: 98.6 F | HEIGHT: 73 IN | OXYGEN SATURATION: 98 % | HEART RATE: 72 BPM | DIASTOLIC BLOOD PRESSURE: 68 MMHG | SYSTOLIC BLOOD PRESSURE: 118 MMHG | WEIGHT: 218.2 LBS

## 2019-05-23 DIAGNOSIS — Z12.5 ENCOUNTER FOR SCREENING FOR MALIGNANT NEOPLASM OF PROSTATE: ICD-10-CM

## 2019-05-23 DIAGNOSIS — F33.9 EPISODE OF RECURRENT MAJOR DEPRESSIVE DISORDER, UNSPECIFIED DEPRESSION EPISODE SEVERITY (HCC): ICD-10-CM

## 2019-05-23 DIAGNOSIS — Z85.46 HISTORY OF PROSTATE CANCER: ICD-10-CM

## 2019-05-23 DIAGNOSIS — K62.7 RADIATION PROCTITIS: ICD-10-CM

## 2019-05-23 DIAGNOSIS — K92.2 LOWER GI BLEEDING: ICD-10-CM

## 2019-05-23 DIAGNOSIS — I10 ESSENTIAL HYPERTENSION: ICD-10-CM

## 2019-05-23 DIAGNOSIS — D50.9 IRON DEFICIENCY ANEMIA, UNSPECIFIED IRON DEFICIENCY ANEMIA TYPE: ICD-10-CM

## 2019-05-23 DIAGNOSIS — R53.83 OTHER FATIGUE: ICD-10-CM

## 2019-05-23 DIAGNOSIS — R73.03 PREDIABETES: ICD-10-CM

## 2019-05-23 DIAGNOSIS — R63.4 UNINTENTIONAL WEIGHT LOSS: ICD-10-CM

## 2019-05-23 DIAGNOSIS — K62.5 RECTAL BLEEDING: Primary | ICD-10-CM

## 2019-05-23 DIAGNOSIS — E55.9 VITAMIN D DEFICIENCY: ICD-10-CM

## 2019-05-23 DIAGNOSIS — E78.5 HYPERLIPIDEMIA, UNSPECIFIED HYPERLIPIDEMIA TYPE: ICD-10-CM

## 2019-05-23 PROCEDURE — 99214 OFFICE O/P EST MOD 30 MIN: CPT | Performed by: FAMILY MEDICINE

## 2019-05-23 RX ORDER — MESALAMINE 1000 MG/1
1000 SUPPOSITORY RECTAL NIGHTLY
Qty: 42 SUPPOSITORY | Refills: 3 | Status: SHIPPED | OUTPATIENT
Start: 2019-05-23 | End: 2020-01-29

## 2019-05-23 NOTE — PROGRESS NOTES
Subjective:  German Ray is a 68 y.o. male who presents for       Patient Active Problem List   Diagnosis   • Hyperlipidemia   • Impaired fasting glucose    • Vitamin D deficiency    • Depression   • Bereavement   • Anemia   • Iron deficiency anemia   • Need for hepatitis C screening test   • Palpable mass of neck   • Encounter for immunization   • Radiation proctitis   • Diverticulosis of large intestine without hemorrhage   • Lower GI bleeding   • Pruritic rash   • Lipoma of back   • Essential hypertension   • Erectile dysfunction   • Need for vaccination   • Medicare annual wellness visit, initial   • Rectal bleeding   • Change in bowel habits   • General medical examination   • Sleep disorder   • Other fatigue   • Obesity   • Prediabetes   • Encounter for screening for endocrine disorder   • Moderate episode of recurrent major depressive disorder (CMS/HCC)           Current Outpatient Medications:   •  calcium carbonate-vitamin d 600-400 MG-UNIT per tablet, Take 1 tablet by mouth Daily., Disp: 30 tablet, Rfl: 3  •  diltiaZEM (TIAZAC) 360 MG 24 hr capsule, Take 1 capsule by mouth Daily., Disp: 90 capsule, Rfl: 3  •  doxazosin (CARDURA) 8 MG tablet, Take 1 tablet by mouth Every Night., Disp: 90 tablet, Rfl: 3  •  ferrous sulfate 325 (65 FE) MG EC tablet, Take 1 tablet by mouth 3 (Three) Times a Day With Meals., Disp: 270 tablet, Rfl: 3  •  hydrochlorothiazide (HYDRODIURIL) 50 MG tablet, Take 1 tablet by mouth Daily., Disp: 90 tablet, Rfl: 3  •  potassium chloride ER (K-TAB) 20 MEQ tablet controlled-release ER tablet, Take 1 tablet by mouth 4 (Four) Times a Day., Disp: 360 tablet, Rfl: 3  •  pravastatin (PRAVACHOL) 80 MG tablet, Take 1 tablet by mouth Every Night., Disp: 90 tablet, Rfl: 3  •  VIAGRA 100 MG tablet, Take 100 mg by mouth Daily As Needed., Disp: , Rfl:   •  vitamin D (ERGOCALCIFEROL) 67769 units capsule capsule, Take 1 capsule by mouth Every 7 (Seven) Days., Disp: 12 capsule, Rfl:  3    PT is 68 overweight male with history of  Major depression, ED,  hypertension, Vitamin D deficiency, history of prostate cancer with radiation therapy in 2016. , iron deficiency anemia  rectal bleeding/ulcer  history of radiation proctitis. diverticulosis  Who is here today with CC of rectal bleeding for past few weeks. He was seeing Gastroenterology and last appt was in 8/30/19. He was taking and prescribed canasa suppositories which helped his symptoms.  His last colonoscopy was on October 2017 showed internal/external hemorrhoids along with divertulcosis, along with erythema along mucosa. Biopsy showed  No significant histologic abnormality in colonic wall. 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 He also sees Hematologist/Oncology In [place]. He states he last saw his Oncologist a month ago. They did do blood work. Per patient he is doing well and there is no recurrence of prostate cancer.  He also sees Urologist Dr Guzmán once a year. He states the rectal bleeding has never went away but has been getting progressively worse the past 6 months. He has rectal bleeding every 3rd bowel movement in which he sees blood in the toilet. No abdominal pain no fever. He also has lost weight. He did lose  9 lbs unintentionally.  since December of 2018.  He has had some night sweats as well.  He was told by his Radiation Oncologist he may need to have rectal surgery to help with the bleeding.      Rectal Bleeding   This is a recurrent problem. The current episode started more than 1 year ago. The problem occurs intermittently. The problem has been waxing and waning. Associated symptoms include fatigue. Pertinent negatives include no abdominal pain, anorexia, arthralgias, change in bowel habit, chest pain, chills, congestion, coughing, diaphoresis, fever, headaches, joint swelling, myalgias, nausea, neck pain, numbness, rash, sore throat, swollen glands, urinary symptoms, vertigo, visual change,  vomiting or weakness. Nothing aggravates the symptoms. Treatments tried: canasa  The treatment provided mild relief.   Weight Loss   This is a recurrent problem. The current episode started more than 1 month ago. The problem occurs constantly. The problem has been gradually worsening. Associated symptoms include fatigue. Pertinent negatives include no abdominal pain, anorexia, arthralgias, change in bowel habit, chest pain, chills, congestion, coughing, diaphoresis, fever, headaches, joint swelling, myalgias, nausea, neck pain, numbness, rash, sore throat, swollen glands, urinary symptoms, vertigo, visual change, vomiting or weakness. Nothing aggravates the symptoms. He has tried nothing for the symptoms. The treatment provided no relief.     Hypertension   This is a chronic problem. The current episode started more than 1 year ago. The problem is unchanged. The problem is controlled. Pertinent negatives include no anxiety, blurred vision, chest pain, headaches, malaise/fatigue, neck pain, orthopnea, palpitations, peripheral edema, PND, shortness of breath or sweats. Risk factors for coronary artery disease include male gender, obesity, dyslipidemia and sedentary lifestyle. Past treatments include angiotensin blockers, calcium channel blockers and diuretics. Current antihypertension treatment includes angiotensin blockers, calcium channel blockers and diuretics. The current treatment provides moderate improvement. There is no history of angina, kidney disease, CAD/MI, CVA, heart failure, left ventricular hypertrophy, PVD or retinopathy. There is no history of chronic renal disease, coarctation of the aorta, hyperaldosteronism, hypercortisolism, hyperparathyroidism, a hypertension causing med, pheochromocytoma, renovascular disease, sleep apnea or a thyroid problem.   Anemia   Presents for follow-up visit. There has been no abdominal pain, anorexia, bruising/bleeding easily, confusion, fever, leg swelling,  light-headedness, malaise/fatigue, pallor, palpitations, paresthesias, pica or weight loss. Signs of blood loss that are not present include hematemesis, hematochezia, melena, menorrhagia and vaginal bleeding. There is no history of chronic renal disease or heart failure. There are no compliance problems.  Compliance with medications is 0-25%.   Depression   Visit Type: follow-up  Patient presents with the following symptoms: anhedonia, decreased concentration, depressed mood and impotence.  Patient is not experiencing: chest pain, choking sensation, compulsions, confusion, dizziness, dry mouth, excessive worry, fatigue, feelings of hopelessness, feelings of worthlessness, hypersomnia, hyperventilation, insomnia, irritability, malaise, memory impairment, muscle tension, nausea, nervousness/anxiety, palpitations, panic, psychomotor agitation, psychomotor retardation, restlessness, shortness of breath, suicidal ideas, suicidal planning, thoughts of death, weight gain and weight loss.  Frequency of symptoms: constantly   Severity: mild   Sleep quality: fair  Nighttime awakenings: none         Review of Systems  Review of Systems   Constitutional: Positive for activity change, fatigue, unexpected weight change and weight loss. Negative for appetite change, chills, diaphoresis and fever.   HENT: Negative for congestion, postnasal drip, rhinorrhea, sinus pressure, sinus pain, sneezing, sore throat, trouble swallowing and voice change.    Respiratory: Negative for cough, choking, chest tightness, shortness of breath, wheezing and stridor.    Cardiovascular: Negative for chest pain.   Gastrointestinal: Positive for hematochezia. Negative for abdominal pain, anorexia, change in bowel habit, diarrhea, nausea and vomiting.   Genitourinary:        Anal bleeding secondary to radiation proctitis    Musculoskeletal: Negative for arthralgias, joint swelling, myalgias and neck pain.   Skin: Negative for rash.   Neurological:  Negative for vertigo, weakness, numbness and headaches.   Psychiatric/Behavioral: Positive for agitation and decreased concentration.       Patient Active Problem List   Diagnosis   • Hyperlipidemia   • Impaired fasting glucose    • Vitamin D deficiency    • Depression   • Bereavement   • Anemia   • Iron deficiency anemia   • Need for hepatitis C screening test   • Palpable mass of neck   • Encounter for immunization   • Radiation proctitis   • Diverticulosis of large intestine without hemorrhage   • Lower GI bleeding   • Pruritic rash   • Lipoma of back   • Essential hypertension   • Erectile dysfunction   • Need for vaccination   • Medicare annual wellness visit, initial   • Rectal bleeding   • Change in bowel habits   • General medical examination   • Sleep disorder   • Other fatigue   • Obesity   • Prediabetes   • Encounter for screening for endocrine disorder   • Moderate episode of recurrent major depressive disorder (CMS/HCC)     Past Surgical History:   Procedure Laterality Date   • APPENDECTOMY     • CATARACT EXTRACTION Bilateral 2013   • COLONOSCOPY      diverticulosis in hte sigmoid colon. Altered vascular mucosa in hte rectum. hemorrhoids, no specimens collected, radation proctitis   • COLONOSCOPY N/A 10/19/2017    Procedure: COLONOSCOPY;  Surgeon: Patric Wooten MD;  Location: [facility] ENDOSCOPY;  Service:      Social History     Socioeconomic History   • Marital status:      Spouse name: Not on file   • Number of children: Not on file   • Years of education: Not on file   • Highest education level: Not on file   Tobacco Use   • Smoking status: Never Smoker   • Smokeless tobacco: Never Used   Substance and Sexual Activity   • Alcohol use: Yes     Comment: 08/30/2018 - Patient reports occasional consumption of alcoholic beverages.   • Drug use: No   • Sexual activity: Defer     Family History   Problem Relation Age of Onset   • Cancer Mother    • Hypertension Brother      No visits with results  within 6 Month(s) from this visit.   Latest known visit with results is:   Office Visit on 05/07/2018   Component Date Value Ref Range Status   • Total Cholesterol 05/08/2018 168  0 - 199 mg/dL Final   • Triglycerides 05/08/2018 63  20 - 199 mg/dL Final   • HDL Cholesterol 05/08/2018 69  60 - 200 mg/dL Final   • LDL Cholesterol  05/08/2018 76  1 - 129 mg/dL Final   • LDL/HDL Ratio 05/08/2018 1.25  0.00 - 3.55 Final   • 25 Hydroxy, Vitamin D 05/08/2018 39.6  30.0 - 100.0 ng/ml Final   • Glucose 05/08/2018 87  60 - 100 mg/dL Final   • BUN 05/08/2018 19  7 - 21 mg/dL Final   • Creatinine 05/08/2018 0.94  0.70 - 1.30 mg/dL Final   • Sodium 05/08/2018 141  137 - 145 mmol/L Final   • Potassium 05/08/2018 3.8  3.5 - 5.1 mmol/L Final   • Chloride 05/08/2018 104  95 - 110 mmol/L Final   • CO2 05/08/2018 28.0  22.0 - 31.0 mmol/L Final   • Calcium 05/08/2018 9.4  8.4 - 10.2 mg/dL Final   • eGFR   Amer 05/08/2018 96  49 - 113 mL/min/1.73 Final   • BUN/Creatinine Ratio 05/08/2018 20.2  7.0 - 25.0 Final   • Anion Gap 05/08/2018 9.0  5.0 - 15.0 mmol/L Final      No image results found.    [unfilled]  Immunization History   Administered Date(s) Administered   • Flu Vaccine Quad PF >36MO 11/15/2017   • Influenza Split Preservative Free ID 10/20/2016   • Pneumococcal Polysaccharide (PPSV23) 03/31/2017   • Tdap 03/31/2017   • influenza Split 10/20/2016       The following portions of the patient's history were reviewed and updated as appropriate: allergies, current medications, past family history, past medical history, past social history, past surgical history and problem list.        Physical Exam  Physical Exam   Constitutional: He is oriented to person, place, and time. He appears well-developed and well-nourished.   HENT:   Head: Normocephalic and atraumatic.   Right Ear: External ear normal.   Eyes: Conjunctivae and EOM are normal. Pupils are equal, round, and reactive to light.   Neck: Normal range of motion. Neck  supple.   Cardiovascular: Normal rate, regular rhythm and normal heart sounds.   No murmur heard.  Pulmonary/Chest: Effort normal and breath sounds normal. No respiratory distress.   Abdominal: Soft. Bowel sounds are normal. He exhibits no distension. There is no tenderness.   Musculoskeletal: Normal range of motion. He exhibits no edema or deformity.   Neurological: He is alert and oriented to person, place, and time. No cranial nerve deficit.   Skin: Skin is warm. No rash noted. He is not diaphoretic. No erythema. No pallor.   Psychiatric: He has a normal mood and affect. His behavior is normal.   Nursing note and vitals reviewed.      Assessment/Plan    Diagnosis Plan   1. Unintentional weight loss  Sedimentation rate, automated    C-reactive protein   2. Rectal bleeding  Occult Blood X 3, Stool - Stool, Per Rectum   3. Vitamin D deficiency      4. Prediabetes     5. Other fatigue     6. Lower GI bleeding  C-reactive protein   7. Essential hypertension  CBC Auto Differential    Comprehensive Metabolic Panel    Lipid Panel    TSH    T4, Free    T3, Free    PSA SCREENING   8. Hyperlipidemia, unspecified hyperlipidemia type     9. Episode of recurrent major depressive disorder, unspecified depression episode severity (CMS/HCC)     10. Iron deficiency anemia, unspecified iron deficiency anemia type  Iron Profile    Ferritin   11. History of prostate cancer  CBC Auto Differential    Comprehensive Metabolic Panel    Lipid Panel    TSH    T4, Free    T3, Free    PSA SCREENING   12. Encounter for screening for malignant neoplasm of prostate   PSA SCREENING      -will get labwork and stool studies  -will get last office note from Hematologist/ Oncologist and Urologist  -history of prostate cancer -Urology following -recheck PsA  -abnormal weight loss - will get CRP and ESR  -fatigue/sleep disorder -  Pt referred to sleep study with Dr. Bella but did not complete sleep study. Pt was given number to reschedule sleep  study. He could not go to sleep..  He did not reschedule an appt   - Vitamin D deficiency - continue vitamin D pill. Recheck vitamin D levels   - Essential hypertension - continue  HCTZ  50 mg PO q daily.  Continue micardis 80 mg daily , cardura. BP at goal today continue tiazac. BP at goal today   -overweight counseled weight loss >5 minutes   - hyperlipidemia -Continue pravastatin.  He is not taking aspirin   -rectal ulcer/diverticulosis/ rectal bleeding/history of radiation proctitis - restart  mesalamine suppository 1000 mg rectal at bedtime.  Recommend high fiber diet. Will make appt with Gastroenterologist.  Hemoccult in office test positive.    -depression - pt is back on celexa but will go up on celexa from 20 to 40 mg dialy Pt declines counseling. He has a female  in [place], [place].     -Anemia - Hematologist following . Iron deficiency anemia. Continue iron pill TID. Hematology following.  Have last Hematologist office note.   May need to need iron therapy  - For mole on back.  Has stuck on appearance. Consider cryotherapy or shave biopsy in the future. Refferral to Dr. Neves for further evaluation was made but mole came off before appt.   -Prevnar 13 vaccination prescription given Pharmacy to give  -recheck in 2 months         This document has been electronically signed by Abel Hoffman MD on May 23, 2019 1:30 PM                         This document has been electronically signed by Abel Hoffman MD on May 23, 2019 1:30 PM

## 2019-05-24 ENCOUNTER — LAB (OUTPATIENT)
Dept: LAB | Facility: HOSPITAL | Age: 69
End: 2019-05-24

## 2019-05-24 DIAGNOSIS — K92.2 LOWER GI BLEEDING: ICD-10-CM

## 2019-05-24 DIAGNOSIS — Z85.46 HISTORY OF PROSTATE CANCER: ICD-10-CM

## 2019-05-24 DIAGNOSIS — R73.03 PREDIABETES: ICD-10-CM

## 2019-05-24 DIAGNOSIS — R63.4 UNINTENTIONAL WEIGHT LOSS: ICD-10-CM

## 2019-05-24 DIAGNOSIS — D50.9 IRON DEFICIENCY ANEMIA, UNSPECIFIED IRON DEFICIENCY ANEMIA TYPE: ICD-10-CM

## 2019-05-24 DIAGNOSIS — E78.5 HYPERLIPIDEMIA, UNSPECIFIED HYPERLIPIDEMIA TYPE: ICD-10-CM

## 2019-05-24 DIAGNOSIS — Z12.5 ENCOUNTER FOR SCREENING FOR MALIGNANT NEOPLASM OF PROSTATE: ICD-10-CM

## 2019-05-24 DIAGNOSIS — Z13.29 ENCOUNTER FOR SCREENING FOR ENDOCRINE DISORDER: ICD-10-CM

## 2019-05-24 DIAGNOSIS — I10 ESSENTIAL HYPERTENSION: ICD-10-CM

## 2019-05-24 LAB — ERYTHROCYTE [SEDIMENTATION RATE] IN BLOOD: 10 MM/HR (ref 0–15)

## 2019-05-24 PROCEDURE — 86140 C-REACTIVE PROTEIN: CPT

## 2019-05-24 PROCEDURE — 83540 ASSAY OF IRON: CPT

## 2019-05-24 PROCEDURE — 84443 ASSAY THYROID STIM HORMONE: CPT

## 2019-05-24 PROCEDURE — G0103 PSA SCREENING: HCPCS

## 2019-05-24 PROCEDURE — 85025 COMPLETE CBC W/AUTO DIFF WBC: CPT

## 2019-05-24 PROCEDURE — 82728 ASSAY OF FERRITIN: CPT

## 2019-05-24 PROCEDURE — 80061 LIPID PANEL: CPT

## 2019-05-24 PROCEDURE — 84439 ASSAY OF FREE THYROXINE: CPT

## 2019-05-24 PROCEDURE — 80053 COMPREHEN METABOLIC PANEL: CPT

## 2019-05-24 PROCEDURE — 85651 RBC SED RATE NONAUTOMATED: CPT

## 2019-05-24 PROCEDURE — 83036 HEMOGLOBIN GLYCOSYLATED A1C: CPT

## 2019-05-24 PROCEDURE — 84481 FREE ASSAY (FT-3): CPT

## 2019-05-24 PROCEDURE — 84466 ASSAY OF TRANSFERRIN: CPT

## 2019-05-25 LAB
ALBUMIN SERPL-MCNC: 3.5 G/DL (ref 3.5–5.2)
ALBUMIN/GLOB SERPL: 1 G/DL
ALP SERPL-CCNC: 77 U/L (ref 39–117)
ALT SERPL W P-5'-P-CCNC: 12 U/L (ref 1–41)
ANION GAP SERPL CALCULATED.3IONS-SCNC: 10.7 MMOL/L
AST SERPL-CCNC: 16 U/L (ref 1–40)
BASOPHILS # BLD AUTO: 0.04 10*3/MM3 (ref 0–0.2)
BASOPHILS NFR BLD AUTO: 1.5 % (ref 0–1.5)
BILIRUB SERPL-MCNC: 0.4 MG/DL (ref 0.2–1.2)
BUN BLD-MCNC: 15 MG/DL (ref 8–23)
BUN/CREAT SERPL: 14.7 (ref 7–25)
CALCIUM SPEC-SCNC: 9.8 MG/DL (ref 8.6–10.5)
CHLORIDE SERPL-SCNC: 106 MMOL/L (ref 98–107)
CHOLEST SERPL-MCNC: 148 MG/DL (ref 0–200)
CO2 SERPL-SCNC: 26.3 MMOL/L (ref 22–29)
CREAT BLD-MCNC: 1.02 MG/DL (ref 0.76–1.27)
CRP SERPL-MCNC: 0.18 MG/DL (ref 0–0.5)
DEPRECATED RDW RBC AUTO: 44.4 FL (ref 37–54)
EOSINOPHIL # BLD AUTO: 0.26 10*3/MM3 (ref 0–0.4)
EOSINOPHIL NFR BLD AUTO: 9.8 % (ref 0.3–6.2)
ERYTHROCYTE [DISTWIDTH] IN BLOOD BY AUTOMATED COUNT: 12.7 % (ref 12.3–15.4)
FERRITIN SERPL-MCNC: 202 NG/ML (ref 30–400)
GFR SERPL CREATININE-BSD FRML MDRD: 88 ML/MIN/1.73
GLOBULIN UR ELPH-MCNC: 3.6 GM/DL
GLUCOSE BLD-MCNC: 96 MG/DL (ref 65–99)
HBA1C MFR BLD: 5.56 % (ref 4.8–5.6)
HCT VFR BLD AUTO: 36.6 % (ref 37.5–51)
HDLC SERPL-MCNC: 47 MG/DL (ref 40–60)
HGB BLD-MCNC: 11.4 G/DL (ref 13–17.7)
IMM GRANULOCYTES # BLD AUTO: 0 10*3/MM3 (ref 0–0.05)
IMM GRANULOCYTES NFR BLD AUTO: 0 % (ref 0–0.5)
IRON 24H UR-MRATE: 60 MCG/DL (ref 59–158)
IRON SATN MFR SERPL: 22 % (ref 20–50)
LDLC SERPL CALC-MCNC: 88 MG/DL (ref 0–100)
LDLC/HDLC SERPL: 1.88 {RATIO}
LYMPHOCYTES # BLD AUTO: 0.57 10*3/MM3 (ref 0.7–3.1)
LYMPHOCYTES NFR BLD AUTO: 21.4 % (ref 19.6–45.3)
MCH RBC QN AUTO: 29.6 PG (ref 26.6–33)
MCHC RBC AUTO-ENTMCNC: 31.1 G/DL (ref 31.5–35.7)
MCV RBC AUTO: 95.1 FL (ref 79–97)
MONOCYTES # BLD AUTO: 0.38 10*3/MM3 (ref 0.1–0.9)
MONOCYTES NFR BLD AUTO: 14.3 % (ref 5–12)
NEUTROPHILS # BLD AUTO: 1.41 10*3/MM3 (ref 1.7–7)
NEUTROPHILS NFR BLD AUTO: 53 % (ref 42.7–76)
NRBC BLD AUTO-RTO: 0 /100 WBC (ref 0–0.2)
PLATELET # BLD AUTO: 235 10*3/MM3 (ref 140–450)
PMV BLD AUTO: 9.9 FL (ref 6–12)
POTASSIUM BLD-SCNC: 3.9 MMOL/L (ref 3.5–5.2)
PROT SERPL-MCNC: 7.1 G/DL (ref 6–8.5)
PSA SERPL-MCNC: 0.58 NG/ML (ref 0–4)
RBC # BLD AUTO: 3.85 10*6/MM3 (ref 4.14–5.8)
SODIUM BLD-SCNC: 143 MMOL/L (ref 136–145)
T3FREE SERPL-MCNC: 2.79 PG/ML (ref 2–4.4)
T4 FREE SERPL-MCNC: 1.06 NG/DL (ref 0.93–1.7)
TIBC SERPL-MCNC: 273 MCG/DL (ref 298–536)
TRANSFERRIN SERPL-MCNC: 183 MG/DL (ref 200–360)
TRIGL SERPL-MCNC: 63 MG/DL (ref 0–150)
TSH SERPL DL<=0.05 MIU/L-ACNC: 1.64 MIU/ML (ref 0.27–4.2)
VLDLC SERPL-MCNC: 12.6 MG/DL (ref 5–40)
WBC NRBC COR # BLD: 2.66 10*3/MM3 (ref 3.4–10.8)

## 2019-06-03 ENCOUNTER — LAB (OUTPATIENT)
Dept: LAB | Facility: HOSPITAL | Age: 69
End: 2019-06-03

## 2019-06-03 DIAGNOSIS — K62.5 RECTAL BLEEDING: ICD-10-CM

## 2019-06-03 PROCEDURE — 82274 ASSAY TEST FOR BLOOD FECAL: CPT

## 2019-06-04 LAB — HEMOCCULT STL QL IA: NEGATIVE

## 2019-06-05 ENCOUNTER — TELEPHONE (OUTPATIENT)
Dept: FAMILY MEDICINE CLINIC | Facility: CLINIC | Age: 69
End: 2019-06-05

## 2019-06-05 NOTE — TELEPHONE ENCOUNTER
----- Message from Abel Hoffman MD sent at 5/26/2019 11:17 AM CDT -----  Call     Pt all labs stable including iron levels.    Pt is still anemic but stable from last check    His Prostate level antigen levels are normal     Thyroid levels normal    His kidney function is stable     Recheck on next visit. Thanks

## 2019-06-05 NOTE — TELEPHONE ENCOUNTER
----- Message from Abel Hoffman MD sent at 6/4/2019  2:37 PM CDT -----  Occult stool negative. Continue with Gastroenterology followup.    Recheck on next visit. Thanks  Unable to reach pt,left message to call me back

## 2019-06-06 ENCOUNTER — OFFICE VISIT (OUTPATIENT)
Dept: GASTROENTEROLOGY | Facility: CLINIC | Age: 69
End: 2019-06-06

## 2019-06-06 VITALS
SYSTOLIC BLOOD PRESSURE: 104 MMHG | HEIGHT: 73 IN | HEART RATE: 62 BPM | DIASTOLIC BLOOD PRESSURE: 62 MMHG | BODY MASS INDEX: 29.5 KG/M2 | WEIGHT: 222.6 LBS

## 2019-06-06 DIAGNOSIS — K92.1 BLOOD IN STOOL: Primary | ICD-10-CM

## 2019-06-06 DIAGNOSIS — K62.7 RADIATION PROCTITIS: ICD-10-CM

## 2019-06-06 PROCEDURE — 99214 OFFICE O/P EST MOD 30 MIN: CPT | Performed by: NURSE PRACTITIONER

## 2019-06-06 RX ORDER — DEXTROSE AND SODIUM CHLORIDE 5; .45 G/100ML; G/100ML
30 INJECTION, SOLUTION INTRAVENOUS CONTINUOUS PRN
Status: CANCELLED | OUTPATIENT
Start: 2019-07-22

## 2019-06-06 RX ORDER — CITALOPRAM 20 MG/1
TABLET ORAL
COMMUNITY
End: 2019-07-24 | Stop reason: SDUPTHER

## 2019-06-06 RX ORDER — SODIUM, POTASSIUM,MAG SULFATES 17.5-3.13G
1 SOLUTION, RECONSTITUTED, ORAL ORAL EVERY 12 HOURS
Qty: 2 BOTTLE | Refills: 0 | Status: SHIPPED | OUTPATIENT
Start: 2019-06-06 | End: 2020-01-29

## 2019-06-06 NOTE — PROGRESS NOTES
Chief Complaint   Patient presents with   • Rectal Bleeding       Subjective    German Ray is a 68 y.o. male. he is here today for follow-up.    Rectal Bleeding   This is a chronic problem. The current episode started more than 1 year ago. The problem occurs intermittently. The problem has been waxing and waning. Associated symptoms include fatigue. Pertinent negatives include no abdominal pain, anorexia, arthralgias, change in bowel habit, chest pain, chills, congestion, coughing, diaphoresis, fever, headaches, joint swelling, myalgias, nausea, neck pain, numbness or vomiting. Treatments tried: canasa supp.  The treatment provided mild relief.   Patient reports he has noted the rectal bleeding intermittently..  He has history of radiation-induced proctitis and history of rectal ulcer.  He is followed with hematologist and neurologist in [facility].  He had recent lab work which was stable.  Denies any constipation.  Stool for occult blood was negative.  He recently restarted Canasa suppositories.  Plan; we will go ahead and schedule patient for colonoscopy due to rectal bleeding.  Follow-up after test return office sooner if needed         The following portions of the patient's history were reviewed and updated as appropriate:   Past Medical History:   Diagnosis Date   • Anemia    • Depression     Major, single episode, unspecified   • Diverticular disease of colon    • Encounter for screening for diabetes mellitus    • Encounter for screening for endocrine disorder     Suspected   • Hemorrhage of anus and rectum    • Hyperlipidemia    • Hypertension    • Hypokalemia    • Primary malignant neoplasm of prostate (CMS/HCC)     prostate   • Radiation proctitis    • Vitamin D deficiency      Past Surgical History:   Procedure Laterality Date   • APPENDECTOMY     • CATARACT EXTRACTION Bilateral 2013   • COLONOSCOPY      diverticulosis in hte sigmoid colon. Altered vascular mucosa in hte rectum.  hemorrhoids, no specimens collected, radation proctitis   • COLONOSCOPY N/A 10/19/2017    Procedure: COLONOSCOPY;  Surgeon: Patric Wooten MD;  Location: [facility] ENDOSCOPY;  Service:      Family History   Problem Relation Age of Onset   • Cancer Mother    • Hypertension Brother        Prior to Admission medications    Medication Sig Start Date End Date Taking? Authorizing Provider   calcium carbonate-vitamin d 600-400 MG-UNIT per tablet Take 1 tablet by mouth Daily. 8/3/18  Yes Abel Hoffman MD   citalopram (CeleXA) 20 MG tablet citalopram 20 mg tablet   Yes Pearl Garcia MD   diltiaZEM (TIAZAC) 360 MG 24 hr capsule Take 1 capsule by mouth Daily. 12/12/18  Yes Abel Hoffman MD   doxazosin (CARDURA) 8 MG tablet Take 1 tablet by mouth Every Night. 7/27/18  Yes Abel Hoffman MD   ferrous sulfate 325 (65 FE) MG EC tablet Take 1 tablet by mouth 3 (Three) Times a Day With Meals. 12/12/18 12/12/19 Yes Abel Hoffman MD   hydrochlorothiazide (HYDRODIURIL) 50 MG tablet Take 1 tablet by mouth Daily. 12/12/18  Yes Abel Hoffman MD   mesalamine (CANASA) 1000 MG suppository Insert 1 suppository into the rectum Every Night. 5/23/19  Yes Abel Hoffman MD   potassium chloride ER (K-TAB) 20 MEQ tablet controlled-release ER tablet Take 1 tablet by mouth 4 (Four) Times a Day. 12/12/18  Yes Abel Hoffman MD   pravastatin (PRAVACHOL) 80 MG tablet Take 1 tablet by mouth Every Night. 12/12/18  Yes Abel Hoffman MD   VIAGRA 100 MG tablet Take 100 mg by mouth Daily As Needed. 5/3/18  Yes Pearl Garcia MD   vitamin D (ERGOCALCIFEROL) 64731 units capsule capsule Take 1 capsule by mouth Every 7 (Seven) Days. 7/27/18  Yes Abel Hoffman MD     No Known Allergies  Social History     Socioeconomic History   • Marital status:      Spouse name: Not on file   • Number of children: Not on file   • Years of education: Not on file   • Highest education level: Not on file   Tobacco Use   • Smoking status:  "Never Smoker   • Smokeless tobacco: Never Used   Substance and Sexual Activity   • Alcohol use: Yes     Comment: 08/30/2018 - Patient reports occasional consumption of alcoholic beverages.   • Drug use: No   • Sexual activity: Defer       Review of Systems  Review of Systems   Constitutional: Positive for fatigue. Negative for activity change, appetite change, chills, diaphoresis, fever and unexpected weight change.   HENT: Negative for congestion.    Respiratory: Negative for cough.    Cardiovascular: Negative for chest pain.   Gastrointestinal: Positive for blood in stool and hematochezia. Negative for abdominal distention, abdominal pain, anal bleeding, anorexia, change in bowel habit, constipation, diarrhea, nausea, rectal pain and vomiting.   Musculoskeletal: Negative for arthralgias, joint swelling, myalgias and neck pain.   Neurological: Negative for numbness and headaches.        /62 (BP Location: Left arm)   Pulse 62   Ht 185.4 cm (73\")   Wt 101 kg (222 lb 9.6 oz)   BMI 29.37 kg/m²     Objective    Physical Exam   Constitutional: He is oriented to person, place, and time. He appears well-developed and well-nourished. He is cooperative. No distress.   HENT:   Head: Normocephalic and atraumatic.   Neck: Normal range of motion. Neck supple. No thyromegaly present.   Cardiovascular: Normal rate, regular rhythm and normal heart sounds.   Pulmonary/Chest: Effort normal and breath sounds normal. He has no wheezes. He has no rhonchi. He has no rales.   Abdominal: Soft. Normal appearance and bowel sounds are normal. He exhibits no distension. There is no hepatosplenomegaly. There is no tenderness. There is no rigidity and no guarding. No hernia.   Lymphadenopathy:     He has no cervical adenopathy.   Neurological: He is alert and oriented to person, place, and time.   Skin: Skin is warm, dry and intact. No rash noted. No pallor.   Psychiatric: He has a normal mood and affect. His speech is normal.     Lab " on 06/03/2019   Component Date Value Ref Range Status   • Occult Blood, Fecal by Immunoassay 06/03/2019 Negative  Negative Final     Assessment/Plan      1. Blood in stool    2. Radiation proctitis    .       Orders placed during this encounter include:  Orders Placed This Encounter   Procedures   • Follow Anesthesia Guidelines / Standing Orders     Standing Status:   Future   • Obtain Informed Consent     Standing Status:   Future     Order Specific Question:   Informed Consent Given For     Answer:   COLONOSCOPY       COLONOSCOPY (N/A)    Review and/or summary of lab tests, radiology, procedures, medications. Review and summary of old records and obtaining of history. The risks and benefits of my recommendations, as well as other treatment options were discussed with the patient today. Questions were answered.    New Medications Ordered This Visit   Medications   • sodium-potassium-magnesium sulfates (SUPREP BOWEL PREP KIT) 17.5-3.13-1.6 GM/177ML solution oral solution     Sig: Take 1 bottle by mouth Every 12 (Twelve) Hours.     Dispense:  2 bottle     Refill:  0       Follow-up: Return in about 4 weeks (around 7/4/2019).          This document has been electronically signed by JOSE MIGUEL Ospina on June 6, 2019 8:17 AM             Results for orders placed or performed in visit on 06/03/19   Occult Blood, Fecal By Immunoassay - Stool, Per Rectum   Result Value Ref Range    Occult Blood, Fecal by Immunoassay Negative Negative   Results for orders placed or performed in visit on 05/24/19   PSA SCREENING   Result Value Ref Range    PSA 0.581 0.000 - 4.000 ng/mL   CBC Auto Differential   Result Value Ref Range    WBC 2.66 (L) 3.40 - 10.80 10*3/mm3    RBC 3.85 (L) 4.14 - 5.80 10*6/mm3    Hemoglobin 11.4 (L) 13.0 - 17.7 g/dL    Hematocrit 36.6 (L) 37.5 - 51.0 %    MCV 95.1 79.0 - 97.0 fL    MCH 29.6 26.6 - 33.0 pg    MCHC 31.1 (L) 31.5 - 35.7 g/dL    RDW 12.7 12.3 - 15.4 %    RDW-SD 44.4 37.0 - 54.0 fl    MPV 9.9 6.0 -  12.0 fL    Platelets 235 140 - 450 10*3/mm3    Neutrophil % 53.0 42.7 - 76.0 %    Lymphocyte % 21.4 19.6 - 45.3 %    Monocyte % 14.3 (H) 5.0 - 12.0 %    Eosinophil % 9.8 (H) 0.3 - 6.2 %    Basophil % 1.5 0.0 - 1.5 %    Immature Grans % 0.0 0.0 - 0.5 %    Neutrophils, Absolute 1.41 (L) 1.70 - 7.00 10*3/mm3    Lymphocytes, Absolute 0.57 (L) 0.70 - 3.10 10*3/mm3    Monocytes, Absolute 0.38 0.10 - 0.90 10*3/mm3    Eosinophils, Absolute 0.26 0.00 - 0.40 10*3/mm3    Basophils, Absolute 0.04 0.00 - 0.20 10*3/mm3    Immature Grans, Absolute 0.00 0.00 - 0.05 10*3/mm3    nRBC 0.0 0.0 - 0.2 /100 WBC   Iron Profile   Result Value Ref Range    Iron 60 59 - 158 mcg/dL    Iron Saturation 22 20 - 50 %    Transferrin 183 (L) 200 - 360 mg/dL    TIBC 273 (L) 298 - 536 mcg/dL   Sedimentation rate, automated   Result Value Ref Range    Sed Rate 10 0 - 15 mm/hr   C-reactive protein   Result Value Ref Range    C-Reactive Protein 0.18 0.00 - 0.50 mg/dL   T3, Free   Result Value Ref Range    T3, Free 2.79 2.00 - 4.40 pg/mL   TSH   Result Value Ref Range    TSH 1.640 0.270 - 4.200 mIU/mL   T4, Free   Result Value Ref Range    Free T4 1.06 0.93 - 1.70 ng/dL   Hemoglobin A1c   Result Value Ref Range    Hemoglobin A1C 5.56 4.80 - 5.60 %   Ferritin   Result Value Ref Range    Ferritin 202.00 30.00 - 400.00 ng/mL   Lipid Panel   Result Value Ref Range    Total Cholesterol 148 0 - 200 mg/dL    Triglycerides 63 0 - 150 mg/dL    HDL Cholesterol 47 40 - 60 mg/dL    LDL Cholesterol  88 0 - 100 mg/dL    VLDL Cholesterol 12.6 5 - 40 mg/dL    LDL/HDL Ratio 1.88    Comprehensive Metabolic Panel   Result Value Ref Range    Glucose 96 65 - 99 mg/dL    BUN 15 8 - 23 mg/dL    Creatinine 1.02 0.76 - 1.27 mg/dL    Sodium 143 136 - 145 mmol/L    Potassium 3.9 3.5 - 5.2 mmol/L    Chloride 106 98 - 107 mmol/L    CO2 26.3 22.0 - 29.0 mmol/L    Calcium 9.8 8.6 - 10.5 mg/dL    Total Protein 7.1 6.0 - 8.5 g/dL    Albumin 3.50 3.50 - 5.20 g/dL    ALT (SGPT) 12 1 - 41  U/L    AST (SGOT) 16 1 - 40 U/L    Alkaline Phosphatase 77 39 - 117 U/L    Total Bilirubin 0.4 0.2 - 1.2 mg/dL    eGFR  African Amer 88 >60 mL/min/1.73    Globulin 3.6 gm/dL    A/G Ratio 1.0 g/dL    BUN/Creatinine Ratio 14.7 7.0 - 25.0    Anion Gap 10.7 mmol/L   Results for orders placed or performed in visit on 05/07/18   Vitamin D 25 Hydroxy   Result Value Ref Range    25 Hydroxy, Vitamin D 39.6 30.0 - 100.0 ng/ml   Lipid Panel   Result Value Ref Range    Total Cholesterol 168 0 - 199 mg/dL    Triglycerides 63 20 - 199 mg/dL    HDL Cholesterol 69 60 - 200 mg/dL    LDL Cholesterol  76 1 - 129 mg/dL    LDL/HDL Ratio 1.25 0.00 - 3.55   Basic metabolic panel   Result Value Ref Range    Glucose 87 60 - 100 mg/dL    BUN 19 7 - 21 mg/dL    Creatinine 0.94 0.70 - 1.30 mg/dL    Sodium 141 137 - 145 mmol/L    Potassium 3.8 3.5 - 5.1 mmol/L    Chloride 104 95 - 110 mmol/L    CO2 28.0 22.0 - 31.0 mmol/L    Calcium 9.4 8.4 - 10.2 mg/dL    eGFR   Amer 96 49 - 113 mL/min/1.73    BUN/Creatinine Ratio 20.2 7.0 - 25.0    Anion Gap 9.0 5.0 - 15.0 mmol/L   Results for orders placed or performed in visit on 02/15/18   Vitamin D 25 Hydroxy   Result Value Ref Range    25 Hydroxy, Vitamin D 47.0 30.0 - 100.0 ng/ml   Results for orders placed or performed in visit on 02/14/18   CBC Auto Differential   Result Value Ref Range    WBC 2.79 (L) 3.20 - 9.80 10*3/mm3    RBC 4.10 (L) 4.37 - 5.74 10*6/mm3    Hemoglobin 11.8 (L) 13.7 - 17.3 g/dL    Hematocrit 37.4 (L) 39.0 - 49.0 %    MCV 91.2 80.0 - 98.0 fL    MCH 28.8 26.5 - 34.0 pg    MCHC 31.6 31.5 - 36.3 g/dL    RDW 14.3 11.5 - 14.5 %    RDW-SD 47.9 (H) 35.1 - 43.9 fl    MPV 9.2 8.0 - 12.0 fL    Platelets 281 150 - 450 10*3/mm3    Neutrophil % 52.4 37.0 - 80.0 %    Lymphocyte % 27.6 10.0 - 50.0 %    Monocyte % 13.6 (H) 0.0 - 12.0 %    Eosinophil % 5.7 0.0 - 7.0 %    Basophil % 0.7 0.0 - 2.0 %    Immature Grans % 0.0 0.0 - 0.5 %    Neutrophils, Absolute 1.46 (L) 2.00 - 8.60 10*3/mm3     Lymphocytes, Absolute 0.77 0.60 - 4.20 10*3/mm3    Monocytes, Absolute 0.38 0.00 - 0.90 10*3/mm3    Eosinophils, Absolute 0.16 0.00 - 0.70 10*3/mm3    Basophils, Absolute 0.02 0.00 - 0.20 10*3/mm3    Immature Grans, Absolute 0.00 0.00 - 0.02 10*3/mm3     *Note: Due to a large number of results and/or encounters for the requested time period, some results have not been displayed. A complete set of results can be found in Results Review.

## 2019-07-24 ENCOUNTER — TELEPHONE (OUTPATIENT)
Dept: FAMILY MEDICINE CLINIC | Facility: CLINIC | Age: 69
End: 2019-07-24

## 2019-07-24 RX ORDER — POTASSIUM CHLORIDE 1500 MG/1
20 TABLET, FILM COATED, EXTENDED RELEASE ORAL 4 TIMES DAILY
Qty: 360 TABLET | Refills: 3 | Status: SHIPPED | OUTPATIENT
Start: 2019-07-24 | End: 2020-01-29 | Stop reason: SDUPTHER

## 2019-07-24 RX ORDER — ERGOCALCIFEROL 1.25 MG/1
50000 CAPSULE ORAL
Qty: 12 CAPSULE | Refills: 3 | Status: SHIPPED | OUTPATIENT
Start: 2019-07-24 | End: 2020-01-29 | Stop reason: SDUPTHER

## 2019-07-24 RX ORDER — SILDENAFIL CITRATE 100 MG
100 TABLET ORAL DAILY PRN
Qty: 10 TABLET | Refills: 3 | Status: SHIPPED | OUTPATIENT
Start: 2019-07-24 | End: 2019-07-24 | Stop reason: SDUPTHER

## 2019-07-24 RX ORDER — PRAVASTATIN SODIUM 80 MG/1
80 TABLET ORAL NIGHTLY
Qty: 90 TABLET | Refills: 3 | Status: SHIPPED | OUTPATIENT
Start: 2019-07-24 | End: 2020-01-29 | Stop reason: SDUPTHER

## 2019-07-24 RX ORDER — CITALOPRAM 20 MG/1
20 TABLET ORAL DAILY
Qty: 30 TABLET | Refills: 3 | Status: SHIPPED | OUTPATIENT
Start: 2019-07-24 | End: 2019-07-24 | Stop reason: SDUPTHER

## 2019-07-24 RX ORDER — POTASSIUM CHLORIDE 1500 MG/1
20 TABLET, FILM COATED, EXTENDED RELEASE ORAL 4 TIMES DAILY
Qty: 360 TABLET | Refills: 3 | Status: SHIPPED | OUTPATIENT
Start: 2019-07-24 | End: 2019-07-24 | Stop reason: SDUPTHER

## 2019-07-24 RX ORDER — DILTIAZEM HYDROCHLORIDE 360 MG/1
360 CAPSULE, EXTENDED RELEASE ORAL DAILY
Qty: 90 CAPSULE | Refills: 3 | Status: SHIPPED | OUTPATIENT
Start: 2019-07-24 | End: 2020-01-29 | Stop reason: SDUPTHER

## 2019-07-24 RX ORDER — PRAVASTATIN SODIUM 80 MG/1
80 TABLET ORAL NIGHTLY
Qty: 90 TABLET | Refills: 3 | Status: SHIPPED | OUTPATIENT
Start: 2019-07-24 | End: 2019-07-24 | Stop reason: SDUPTHER

## 2019-07-24 RX ORDER — HYDROCHLOROTHIAZIDE 50 MG/1
50 TABLET ORAL DAILY
Qty: 90 TABLET | Refills: 3 | Status: SHIPPED | OUTPATIENT
Start: 2019-07-24 | End: 2020-01-29

## 2019-07-24 RX ORDER — SILDENAFIL CITRATE 100 MG
100 TABLET ORAL DAILY PRN
Qty: 10 TABLET | Refills: 3 | Status: SHIPPED | OUTPATIENT
Start: 2019-07-24 | End: 2020-01-29 | Stop reason: SDUPTHER

## 2019-07-24 RX ORDER — DOXAZOSIN 8 MG/1
8 TABLET ORAL NIGHTLY
Qty: 90 TABLET | Refills: 3 | Status: SHIPPED | OUTPATIENT
Start: 2019-07-24 | End: 2020-01-29 | Stop reason: SDUPTHER

## 2019-07-24 RX ORDER — CITALOPRAM 20 MG/1
20 TABLET ORAL DAILY
Qty: 30 TABLET | Refills: 3 | Status: SHIPPED | OUTPATIENT
Start: 2019-07-24 | End: 2020-01-29 | Stop reason: SDUPTHER

## 2019-08-30 ENCOUNTER — TELEPHONE (OUTPATIENT)
Dept: FAMILY MEDICINE CLINIC | Facility: CLINIC | Age: 69
End: 2019-08-30

## 2019-08-30 RX ORDER — TELMISARTAN 40 MG/1
40 TABLET ORAL DAILY
Qty: 30 TABLET | Refills: 3 | Status: SHIPPED | OUTPATIENT
Start: 2019-08-30 | End: 2020-01-29 | Stop reason: SDUPTHER

## 2019-12-10 NOTE — PROGRESS NOTES
Subjective:  German Ray is a 69 y.o. male who presents for       Patient Active Problem List   Diagnosis   • Hyperlipidemia   • Impaired fasting glucose    • Vitamin D deficiency    • Depression   • Bereavement   • Anemia   • Iron deficiency anemia   • Need for hepatitis C screening test   • Palpable mass of neck   • Encounter for immunization   • Radiation proctitis   • Diverticulosis of large intestine without hemorrhage   • Lower GI bleeding   • Pruritic rash   • Lipoma of back   • Essential hypertension   • Erectile dysfunction   • Need for vaccination   • Medicare annual wellness visit, initial   • Rectal bleeding   • Change in bowel habits   • General medical examination   • Sleep disorder   • Other fatigue   • Obesity   • Prediabetes   • Encounter for screening for endocrine disorder   • Moderate episode of recurrent major depressive disorder (CMS/HCC)   • Unintentional weight loss   • Encounter for screening for malignant neoplasm of prostate    • History of prostate cancer   • Blood in stool           Current Outpatient Medications:   •  calcium carbonate-vitamin d 600-400 MG-UNIT per tablet, Take 1 tablet by mouth Daily., Disp: 30 tablet, Rfl: 3  •  citalopram (CeleXA) 20 MG tablet, Take 1 tablet by mouth Daily., Disp: 30 tablet, Rfl: 3  •  diltiaZEM (TIAZAC) 360 MG 24 hr capsule, Take 1 capsule by mouth Daily., Disp: 90 capsule, Rfl: 3  •  doxazosin (CARDURA) 8 MG tablet, Take 1 tablet by mouth Every Night., Disp: 90 tablet, Rfl: 3  •  ferrous sulfate 325 (65 FE) MG EC tablet, Take 1 tablet by mouth 3 (Three) Times a Day With Meals., Disp: 270 tablet, Rfl: 3  •  hydrochlorothiazide (HYDRODIURIL) 50 MG tablet, Take 1 tablet by mouth Daily., Disp: 90 tablet, Rfl: 3  •  mesalamine (CANASA) 1000 MG suppository, Insert 1 suppository into the rectum Every Night., Disp: 42 suppository, Rfl: 3  •  potassium chloride ER (K-TAB) 20 MEQ tablet controlled-release ER tablet, Take 1 tablet by mouth 4  (Four) Times a Day., Disp: 360 tablet, Rfl: 3  •  pravastatin (PRAVACHOL) 80 MG tablet, Take 1 tablet by mouth Every Night., Disp: 90 tablet, Rfl: 3  •  sodium-potassium-magnesium sulfates (SUPREP BOWEL PREP KIT) 17.5-3.13-1.6 GM/177ML solution oral solution, Take 1 bottle by mouth Every 12 (Twelve) Hours., Disp: 2 bottle, Rfl: 0  •  telmisartan (MICARDIS) 40 MG tablet, Take 1 tablet by mouth Daily., Disp: 30 tablet, Rfl: 3  •  VIAGRA 100 MG tablet, Take 1 tablet by mouth Daily As Needed for erectile dysfunction., Disp: 10 tablet, Rfl: 3  •  vitamin D (ERGOCALCIFEROL) 86675 units capsule capsule, Take 1 capsule by mouth Every 7 (Seven) Days., Disp: 12 capsule, Rfl: 3    HPI     Pt is 68 yo male with management of being overweight, ED, HTN, major depression, HLP, history of prostate cancer with radiation therapy, iron deficiency anemia, history of rectal bleeding/ulcer, history of radiation proctitis, diverticulosis, vitamin D deficiency, prediabetes, sp appendectomy, sp cataract surgery,     5/23/19 PT is 68 overweight male with history of  Major depression, ED,  hypertension, Vitamin D deficiency, history of prostate cancer with radiation therapy in 2016. , iron deficiency anemia  rectal bleeding/ulcer  history of radiation proctitis. diverticulosis  Who is here today with CC of rectal bleeding for past few weeks. He was seeing Gastroenterology and last appt was in 8/30/19. He was taking and prescribed canasa suppositories which helped his symptoms.  His last colonoscopy was on October 2017 showed internal/external hemorrhoids along with divertulcosis, along with erythema along mucosa. Biopsy showed  No significant histologic abnormality in colonic wall. 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 He also sees Hematologist/Oncology In [place]. He states he last saw his Oncologist a month ago. They did do blood work. Per patient he is doing well and there is no recurrence of prostate cancer.   He also sees Urologist Dr Guzmán once a year. He states the rectal bleeding has never went away but has been getting progressively worse the past 6 months. He has rectal bleeding every 3rd bowel movement in which he sees blood in the toilet. No abdominal pain no fever. He also has lost weight. He did lose  9 lbs unintentionally.  since December of 2018.  He has had some night sweats as well.  He was told by his Radiation Oncologist he may need to have rectal surgery to help with the bleeding.      12/18/19 pt is here for recheck and followup. He continues to follwoup with Hematology regarding his iron deficiency and had iron IV infusion therapy He also has a  history of prostate cancer . He had radiation treatmen in the pastHe was also seeing Gastroenteorlogist for h is history of radiation proctitis. He has been seeing Dr. Ryan and had colonoscopy done this year that showed no malignancy or GI bleeding.  He has been going back and forth from her to [place]. He may move to [place] soon sllowly. Doing well overall no chest pain no dizziness. Fatigue and energy stable.  No bleeding throught stools. No hematuria. He has an upcoming appt with his Urologist in [place] in January. BP up today. He states usually at home it is normal      Rectal Bleeding   This is a recurrent problem. The current episode started more than 1 year ago. The problem occurs intermittently. The problem has been waxing and waning. Associated symptoms include fatigue. Pertinent negatives include no abdominal pain, anorexia, arthralgias, change in bowel habit, chest pain, chills, congestion, coughing, diaphoresis, fever, headaches, joint swelling, myalgias, nausea, neck pain, numbness, rash, sore throat, swollen glands, urinary symptoms, vertigo, visual change, vomiting or weakness. Nothing aggravates the symptoms. Treatments tried: canasa  The treatment provided mild relief.   Weight Loss   This is a recurrent problem. The current episode started  more than 1 month ago. The problem occurs constantly. The problem has been gradually worsening. Associated symptoms include fatigue. Pertinent negatives include no abdominal pain, anorexia, arthralgias, change in bowel habit, chest pain, chills, congestion, coughing, diaphoresis, fever, headaches, joint swelling, myalgias, nausea, neck pain, numbness, rash, sore throat, swollen glands, urinary symptoms, vertigo, visual change, vomiting or weakness. Nothing aggravates the symptoms. He has tried nothing for the symptoms. The treatment provided no relief.    Hypertension   This is a chronic problem. The current episode started more than 1 year ago. The problem is unchanged. The problem is controlled. Pertinent negatives include no anxiety, blurred vision, chest pain, headaches, malaise/fatigue, neck pain, orthopnea, palpitations, peripheral edema, PND, shortness of breath or sweats. Risk factors for coronary artery disease include male gender, obesity, dyslipidemia and sedentary lifestyle. Past treatments include angiotensin blockers, calcium channel blockers and diuretics. Current antihypertension treatment includes angiotensin blockers, calcium channel blockers and diuretics. The current treatment provides moderate improvement. There is no history of angina, kidney disease, CAD/MI, CVA, heart failure, left ventricular hypertrophy, PVD or retinopathy. There is no history of chronic renal disease, coarctation of the aorta, hyperaldosteronism, hypercortisolism, hyperparathyroidism, a hypertension causing med, pheochromocytoma, renovascular disease, sleep apnea or a thyroid problem.   Anemia   Presents for follow-up visit. There has been no abdominal pain, anorexia, bruising/bleeding easily, confusion, fever, leg swelling, light-headedness, malaise/fatigue, pallor, palpitations, paresthesias, pica or weight loss. Signs of blood loss that are not present include hematemesis, hematochezia, melena, menorrhagia and vaginal  bleeding. There is no history of chronic renal disease or heart failure. There are no compliance problems.  Compliance with medications is 0-25%.   Depression   Visit Type: follow-up  Patient presents with the following symptoms: anhedonia, decreased concentration, depressed mood and impotence.  Patient is not experiencing: chest pain, choking sensation, compulsions, confusion, dizziness, dry mouth, excessive worry, fatigue, feelings of hopelessness, feelings of worthlessness, hypersomnia, hyperventilation, insomnia, irritability, malaise, memory impairment, muscle tension, nausea, nervousness/anxiety, palpitations, panic, psychomotor agitation, psychomotor retardation, restlessness, shortness of breath, suicidal ideas, suicidal planning, thoughts of death, weight gain and weight loss.  Frequency of symptoms: constantly   Severity: mild   Sleep quality: fair  Nighttime awakenings: none       Review of Systems  Review of Systems   Constitutional: Positive for activity change and fatigue. Negative for appetite change, chills, diaphoresis and fever.   HENT: Negative for congestion, postnasal drip, rhinorrhea, sinus pressure, sinus pain, sneezing, sore throat, trouble swallowing and voice change.    Respiratory: Positive for shortness of breath. Negative for cough, choking, chest tightness, wheezing and stridor.    Cardiovascular: Negative for chest pain.   Gastrointestinal: Positive for abdominal pain and blood in stool. Negative for diarrhea, nausea and vomiting.   Neurological: Positive for weakness and numbness. Negative for headaches.   Psychiatric/Behavioral:        Depressed mood        Patient Active Problem List   Diagnosis   • Hyperlipidemia   • Impaired fasting glucose    • Vitamin D deficiency    • Depression   • Bereavement   • Anemia   • Iron deficiency anemia   • Need for hepatitis C screening test   • Palpable mass of neck   • Encounter for immunization   • Radiation proctitis   • Diverticulosis of large  intestine without hemorrhage   • Lower GI bleeding   • Pruritic rash   • Lipoma of back   • Essential hypertension   • Erectile dysfunction   • Need for vaccination   • Medicare annual wellness visit, initial   • Rectal bleeding   • Change in bowel habits   • General medical examination   • Sleep disorder   • Other fatigue   • Obesity   • Prediabetes   • Encounter for screening for endocrine disorder   • Moderate episode of recurrent major depressive disorder (CMS/HCC)   • Unintentional weight loss   • Encounter for screening for malignant neoplasm of prostate    • History of prostate cancer   • Blood in stool     Past Surgical History:   Procedure Laterality Date   • APPENDECTOMY     • CATARACT EXTRACTION Bilateral 2013   • COLONOSCOPY      diverticulosis in hte sigmoid colon. Altered vascular mucosa in hte rectum. hemorrhoids, no specimens collected, radation proctitis   • COLONOSCOPY N/A 10/19/2017    Procedure: COLONOSCOPY;  Surgeon: Patric Wooten MD;  Location[facility] ENDOSCOPY;  Service:      Social History     Socioeconomic History   • Marital status:      Spouse name: Not on file   • Number of children: Not on file   • Years of education: Not on file   • Highest education level: Not on file   Tobacco Use   • Smoking status: Never Smoker   • Smokeless tobacco: Never Used   Substance and Sexual Activity   • Alcohol use: Yes     Comment: 08/30/2018 - Patient reports occasional consumption of alcoholic beverages.   • Drug use: No   • Sexual activity: Defer     Family History   Problem Relation Age of Onset   • Cancer Mother    • Hypertension Brother      No visits with results within 6 Month(s) from this visit.   Latest known visit with results is:   Lab on 06/03/2019   Component Date Value Ref Range Status   • Occult Blood, Fecal by Immunoassay 06/03/2019 Negative  Negative Final      No image results found.    [unfilled]  Immunization History   Administered Date(s) Administered   • Flu Vaccine Quad  "PF >36MO 11/15/2017   • Influenza Split Preservative Free ID 10/20/2016   • Pneumococcal Polysaccharide (PPSV23) 03/31/2017   • Tdap 03/31/2017   • influenza Split 10/20/2016       The following portions of the patient's history were reviewed and updated as appropriate: allergies, current medications, past family history, past medical history, past social history, past surgical history and problem list.        Physical Exam  /76 (BP Location: Right arm, Patient Position: Sitting, Cuff Size: Adult)   Pulse 70   Temp 98 °F (36.7 °C) (Oral)   Ht 185.4 cm (73\")   Wt 105 kg (232 lb)   SpO2 98%   BMI 30.61 kg/m²     Physical Exam   Constitutional: He is oriented to person, place, and time. He appears well-developed and well-nourished.   HENT:   Head: Normocephalic and atraumatic.   Right Ear: External ear normal.   Eyes: Pupils are equal, round, and reactive to light. Conjunctivae and EOM are normal.   Neck: Normal range of motion. Neck supple.   Cardiovascular: Normal rate, regular rhythm and normal heart sounds.   No murmur heard.  Pulmonary/Chest: Effort normal and breath sounds normal. No respiratory distress.   Abdominal: Soft. Bowel sounds are normal. He exhibits no distension. There is tenderness.   Musculoskeletal: Normal range of motion. He exhibits tenderness. He exhibits no edema or deformity.   Neurological: He is alert and oriented to person, place, and time. No cranial nerve deficit.   Skin: Skin is warm. No rash noted. He is not diaphoretic. No erythema. No pallor.   Psychiatric: He has a normal mood and affect. His behavior is normal.   Nursing note and vitals reviewed.      Assessment/Plan    Diagnosis Plan   1. Hyperlipidemia, unspecified hyperlipidemia type     2. Iron deficiency anemia, unspecified iron deficiency anemia type     3. Moderate episode of recurrent major depressive disorder (CMS/HCC)     4. History of prostate cancer     5. Essential hypertension     6. Erectile dysfunction, " unspecified erectile dysfunction type     7. Diverticulosis of large intestine without hemorrhage     8. Episode of recurrent major depressive disorder, unspecified depression episode severity (CMS/HCC)     9. Vitamin D deficiency      10. Prediabetes     11. Obesity, unspecified classification, unspecified obesity type, unspecified whether serious comorbidity present          -recommend labwork  -recommend influenza vaccination   - he declined today   -will get last office note from Hematologist/ Oncologist and Urologist  -history of prostate cancer -Urology following -last PSA was normal   -abnormal weight loss - will get CRP and ESR  -fatigue/sleep disorder -  Pt referred to sleep study with Dr. Bella but did not complete sleep study. Pt was given number to reschedule sleep study. He could not go to sleep..  He did not reschedule an appt   - Vitamin D deficiency - continue vitamin D pill. Recheck vitamin D levels   - Essential hypertension - continue  HCTZ  50 mg PO q daily.   micardis 40 mg Po qdaily and tiazac 360 mg PO q daily.   -overweight counseled weight loss >5 minutes   - hyperlipidemia -Continue pravastatin.  He is not taking aspirin   -rectal ulcer/diverticulosis/ rectal bleeding/history of radiation proctitis - was   mesalamine suppository 1000 mg rectal at bedtime.  Recommend high fiber diet. Will make appt with Gastroenterologist.  Hemoccult in office test positive.    -depression - pt is back on celexa but will go up on celexa from 20 to 40 mg dialy Pt declines counseling. He has a female  in [place], [place].     -Anemia - Hematologist following . Iron deficiency anemia. Continue iron pill TID. Hematology following.  Have last Hematologist office note.   He had iron infusion therapy  - For mole on back.  Has stuck on appearance. Consider cryotherapy or shave biopsy in the future. Refferral to Dr. Neves for further evaluation was made but mole came off before appt.   -Prevnar 13 vaccination  prescription given Pharmacy to give  -advised pt to be safe and call with questions and concerns  -advised pt to go to ER or call 911 if symptoms worrisome or severe  -advised to followup with specialist and referrals   -recheck in 6 weeks        This document has been electronically signed by Abel Hoffman MD on December 18, 2019 7:27 AM

## 2019-12-18 ENCOUNTER — OFFICE VISIT (OUTPATIENT)
Dept: FAMILY MEDICINE CLINIC | Facility: CLINIC | Age: 69
End: 2019-12-18

## 2019-12-18 VITALS
SYSTOLIC BLOOD PRESSURE: 150 MMHG | DIASTOLIC BLOOD PRESSURE: 76 MMHG | HEIGHT: 73 IN | BODY MASS INDEX: 30.75 KG/M2 | OXYGEN SATURATION: 98 % | WEIGHT: 232 LBS | HEART RATE: 70 BPM | TEMPERATURE: 98 F

## 2019-12-18 DIAGNOSIS — N52.9 ERECTILE DYSFUNCTION, UNSPECIFIED ERECTILE DYSFUNCTION TYPE: ICD-10-CM

## 2019-12-18 DIAGNOSIS — I10 ESSENTIAL HYPERTENSION: Primary | ICD-10-CM

## 2019-12-18 DIAGNOSIS — F33.1 MODERATE EPISODE OF RECURRENT MAJOR DEPRESSIVE DISORDER (HCC): ICD-10-CM

## 2019-12-18 DIAGNOSIS — E78.5 HYPERLIPIDEMIA, UNSPECIFIED HYPERLIPIDEMIA TYPE: ICD-10-CM

## 2019-12-18 DIAGNOSIS — Z85.46 HISTORY OF PROSTATE CANCER: ICD-10-CM

## 2019-12-18 DIAGNOSIS — E66.9 OBESITY, UNSPECIFIED CLASSIFICATION, UNSPECIFIED OBESITY TYPE, UNSPECIFIED WHETHER SERIOUS COMORBIDITY PRESENT: ICD-10-CM

## 2019-12-18 DIAGNOSIS — D50.9 IRON DEFICIENCY ANEMIA, UNSPECIFIED IRON DEFICIENCY ANEMIA TYPE: ICD-10-CM

## 2019-12-18 DIAGNOSIS — K57.30 DIVERTICULOSIS OF LARGE INTESTINE WITHOUT HEMORRHAGE: ICD-10-CM

## 2019-12-18 DIAGNOSIS — E55.9 VITAMIN D DEFICIENCY: ICD-10-CM

## 2019-12-18 DIAGNOSIS — F33.9 EPISODE OF RECURRENT MAJOR DEPRESSIVE DISORDER, UNSPECIFIED DEPRESSION EPISODE SEVERITY (HCC): ICD-10-CM

## 2019-12-18 PROCEDURE — 99214 OFFICE O/P EST MOD 30 MIN: CPT | Performed by: FAMILY MEDICINE

## 2019-12-18 RX ORDER — PNV NO.95/FERROUS FUM/FOLIC AC 28MG-0.8MG
TABLET ORAL
COMMUNITY
End: 2020-01-29 | Stop reason: SDUPTHER

## 2019-12-19 ENCOUNTER — LAB (OUTPATIENT)
Dept: LAB | Facility: HOSPITAL | Age: 69
End: 2019-12-19

## 2019-12-19 DIAGNOSIS — I10 ESSENTIAL HYPERTENSION: ICD-10-CM

## 2019-12-19 DIAGNOSIS — Z85.46 HISTORY OF PROSTATE CANCER: ICD-10-CM

## 2019-12-19 LAB
ALBUMIN SERPL-MCNC: 4.1 G/DL (ref 3.5–5.2)
ALBUMIN/GLOB SERPL: 1.2 G/DL
ALP SERPL-CCNC: 78 U/L (ref 39–117)
ALT SERPL W P-5'-P-CCNC: 15 U/L (ref 1–41)
ANION GAP SERPL CALCULATED.3IONS-SCNC: 11.7 MMOL/L (ref 5–15)
AST SERPL-CCNC: 16 U/L (ref 1–40)
BASOPHILS # BLD AUTO: 0.04 10*3/MM3 (ref 0–0.2)
BASOPHILS NFR BLD AUTO: 1.3 % (ref 0–1.5)
BILIRUB SERPL-MCNC: 0.4 MG/DL (ref 0.2–1.2)
BUN BLD-MCNC: 18 MG/DL (ref 8–23)
BUN/CREAT SERPL: 15.5 (ref 7–25)
CALCIUM SPEC-SCNC: 9.7 MG/DL (ref 8.6–10.5)
CHLORIDE SERPL-SCNC: 99 MMOL/L (ref 98–107)
CHOLEST SERPL-MCNC: 159 MG/DL (ref 0–200)
CO2 SERPL-SCNC: 29.3 MMOL/L (ref 22–29)
CREAT BLD-MCNC: 1.16 MG/DL (ref 0.76–1.27)
DEPRECATED RDW RBC AUTO: 42 FL (ref 37–54)
EOSINOPHIL # BLD AUTO: 0.32 10*3/MM3 (ref 0–0.4)
EOSINOPHIL NFR BLD AUTO: 10.4 % (ref 0.3–6.2)
ERYTHROCYTE [DISTWIDTH] IN BLOOD BY AUTOMATED COUNT: 12.7 % (ref 12.3–15.4)
GFR SERPL CREATININE-BSD FRML MDRD: 75 ML/MIN/1.73
GLOBULIN UR ELPH-MCNC: 3.4 GM/DL
GLUCOSE BLD-MCNC: 98 MG/DL (ref 65–99)
HCT VFR BLD AUTO: 37.8 % (ref 37.5–51)
HDLC SERPL-MCNC: 55 MG/DL (ref 40–60)
HGB BLD-MCNC: 12.5 G/DL (ref 13–17.7)
IMM GRANULOCYTES # BLD AUTO: 0.01 10*3/MM3 (ref 0–0.05)
IMM GRANULOCYTES NFR BLD AUTO: 0.3 % (ref 0–0.5)
LDLC SERPL CALC-MCNC: 84 MG/DL (ref 0–100)
LDLC/HDLC SERPL: 1.52 {RATIO}
LYMPHOCYTES # BLD AUTO: 0.87 10*3/MM3 (ref 0.7–3.1)
LYMPHOCYTES NFR BLD AUTO: 28.2 % (ref 19.6–45.3)
MCH RBC QN AUTO: 30.1 PG (ref 26.6–33)
MCHC RBC AUTO-ENTMCNC: 33.1 G/DL (ref 31.5–35.7)
MCV RBC AUTO: 91.1 FL (ref 79–97)
MONOCYTES # BLD AUTO: 0.46 10*3/MM3 (ref 0.1–0.9)
MONOCYTES NFR BLD AUTO: 14.9 % (ref 5–12)
NEUTROPHILS # BLD AUTO: 1.38 10*3/MM3 (ref 1.7–7)
NEUTROPHILS NFR BLD AUTO: 44.9 % (ref 42.7–76)
NRBC BLD AUTO-RTO: 0 /100 WBC (ref 0–0.2)
PLATELET # BLD AUTO: 280 10*3/MM3 (ref 140–450)
PMV BLD AUTO: 9.9 FL (ref 6–12)
POTASSIUM BLD-SCNC: 3.8 MMOL/L (ref 3.5–5.2)
PROT SERPL-MCNC: 7.5 G/DL (ref 6–8.5)
RBC # BLD AUTO: 4.15 10*6/MM3 (ref 4.14–5.8)
SODIUM BLD-SCNC: 140 MMOL/L (ref 136–145)
TRIGL SERPL-MCNC: 101 MG/DL (ref 0–150)
VLDLC SERPL-MCNC: 20.2 MG/DL (ref 5–40)
WBC NRBC COR # BLD: 3.08 10*3/MM3 (ref 3.4–10.8)

## 2019-12-19 PROCEDURE — 80053 COMPREHEN METABOLIC PANEL: CPT

## 2019-12-19 PROCEDURE — 85025 COMPLETE CBC W/AUTO DIFF WBC: CPT

## 2019-12-19 PROCEDURE — 80061 LIPID PANEL: CPT

## 2019-12-20 ENCOUNTER — TELEPHONE (OUTPATIENT)
Dept: FAMILY MEDICINE CLINIC | Facility: CLINIC | Age: 69
End: 2019-12-20

## 2019-12-23 ENCOUNTER — TELEPHONE (OUTPATIENT)
Dept: FAMILY MEDICINE CLINIC | Facility: CLINIC | Age: 69
End: 2019-12-23

## 2019-12-24 ENCOUNTER — TELEPHONE (OUTPATIENT)
Dept: FAMILY MEDICINE CLINIC | Facility: CLINIC | Age: 69
End: 2019-12-24

## 2019-12-24 NOTE — TELEPHONE ENCOUNTER
----- Message from Abel Hoffman MD sent at 12/19/2019  8:43 PM CST -----  Please let pt know    Kidney function stable but needs to drink more water    Liver function stable    Lipid panel stable     On CBC hemoglobin improved from last visit at 12.5     Continue followup     Thanks

## 2019-12-26 ENCOUNTER — TELEPHONE (OUTPATIENT)
Dept: FAMILY MEDICINE CLINIC | Facility: CLINIC | Age: 69
End: 2019-12-26

## 2019-12-31 ENCOUNTER — TELEPHONE (OUTPATIENT)
Dept: FAMILY MEDICINE CLINIC | Facility: CLINIC | Age: 69
End: 2019-12-31

## 2020-01-02 ENCOUNTER — TELEPHONE (OUTPATIENT)
Dept: FAMILY MEDICINE CLINIC | Facility: CLINIC | Age: 70
End: 2020-01-02

## 2020-01-13 NOTE — PROGRESS NOTES
Subjective:  German Ray is a 69 y.o. male who presents for       Patient Active Problem List   Diagnosis   • Hyperlipidemia   • Impaired fasting glucose    • Vitamin D deficiency    • Depression   • Bereavement   • Anemia   • Iron deficiency anemia   • Need for hepatitis C screening test   • Palpable mass of neck   • Encounter for immunization   • Radiation proctitis   • Diverticulosis of large intestine without hemorrhage   • Lower GI bleeding   • Pruritic rash   • Lipoma of back   • Essential hypertension   • Erectile dysfunction   • Need for vaccination   • Medicare annual wellness visit, initial   • Rectal bleeding   • Change in bowel habits   • General medical examination   • Sleep disorder   • Other fatigue   • Obesity   • Prediabetes   • Encounter for screening for endocrine disorder   • Moderate episode of recurrent major depressive disorder (CMS/HCC)   • Unintentional weight loss   • Encounter for screening for malignant neoplasm of prostate    • History of prostate cancer   • Blood in stool   • Nasal congestion           Current Outpatient Medications:   •  calcium carbonate-vitamin d 600-400 MG-UNIT per tablet, Take 1 tablet by mouth Daily., Disp: 90 tablet, Rfl: 0  •  citalopram (CeleXA) 20 MG tablet, Take 1 tablet by mouth Daily., Disp: 90 tablet, Rfl: 0  •  dilTIAZem (TIAZAC) 360 MG 24 hr capsule, Take 1 capsule by mouth Daily., Disp: 90 capsule, Rfl: 3  •  doxazosin (CARDURA) 8 MG tablet, Take 1 tablet by mouth Every Night., Disp: 90 tablet, Rfl: 3  •  ferrous sulfate 325 (65 Fe) MG tablet, Take 1 tablet by mouth Daily With Breakfast., Disp: 30 tablet, Rfl: 3  •  potassium chloride ER (K-TAB) 20 MEQ tablet controlled-release ER tablet, Take 1 tablet by mouth 4 (Four) Times a Day., Disp: 360 tablet, Rfl: 3  •  pravastatin (PRAVACHOL) 80 MG tablet, Take 1 tablet by mouth Every Night., Disp: 90 tablet, Rfl: 3  •  telmisartan (MICARDIS) 40 MG tablet, Take 1 tablet by mouth Daily., Disp: 90  tablet, Rfl: 0  •  VIAGRA 100 MG tablet, Take 1 tablet by mouth Daily As Needed for Erectile Dysfunction., Disp: 10 tablet, Rfl: 3  •  vitamin D (ERGOCALCIFEROL) 1.25 MG (96794 UT) capsule capsule, Take 1 capsule by mouth Every 7 (Seven) Days., Disp: 12 capsule, Rfl: 3  •  chlorthalidone (HYGROTON) 25 MG tablet, Take 1 tablet by mouth Daily., Disp: 30 tablet, Rfl: 3  •  fluticasone (FLONASE) 50 MCG/ACT nasal spray, 2 sprays into the nostril(s) as directed by provider Daily for 30 days., Disp: 1 bottle, Rfl: 3    HPI     Pt is 68 yo male with management of being overweight, ED, HTN, major depression, HLP, history of prostate cancer with radiation therapy, iron deficiency anemia, history of rectal bleeding/ulcer, history of radiation proctitis, diverticulosis, vitamin D deficiency, prediabetes, sp appendectomy, sp cataract surgery,         12/18/19 pt is here for recheck and followup. He continues to follwoup with Hematology regarding his iron deficiency and had iron IV infusion therapy He also has a  history of prostate cancer . He had radiation treatmen in the pastHe was also seeing Gastroenteorlogist for h is history of radiation proctitis. He has been seeing Dr. Ryan and had colonoscopy done this year that showed no malignancy or GI bleeding.  He has been going back and forth from her to [place]. He may move to [place] soon sllowly. Doing well overall no chest pain no dizziness. Fatigue and energy stable.  No bleeding throught stools. No hematuria. He has an upcoming appt with his Urologist in [place] in January. BP up today. He states usually at home it is normal     1/29/20 pt is here for BP recheck . stil lhas high readings at home. Recenlty had labwork done with Oncologist with Dr. Marin . Still has GI bleeding but has yet to followup with GI specialist. Per patient does not need iron transfusion. He may be go to [place] soon to be with his GF. Denies any chest pain no dizziness. He also has nasal  congestion.     Rectal Bleeding   This is a recurrent problem. The current episode started more than 1 year ago. The problem occurs intermittently. The problem has been waxing and waning. Associated symptoms include fatigue. Pertinent negatives include no abdominal pain, anorexia, arthralgias, change in bowel habit, chest pain, chills, congestion, coughing, diaphoresis, fever, headaches, joint swelling, myalgias, nausea, neck pain, numbness, rash, sore throat, swollen glands, urinary symptoms, vertigo, visual change, vomiting or weakness. Nothing aggravates the symptoms. Treatments tried: canasa  The treatment provided mild relief.   Weight Loss   This is a recurrent problem. The current episode started more than 1 month ago. The problem occurs constantly. The problem has been gradually worsening. Associated symptoms include fatigue. Pertinent negatives include no abdominal pain, anorexia, arthralgias, change in bowel habit, chest pain, chills, congestion, coughing, diaphoresis, fever, headaches, joint swelling, myalgias, nausea, neck pain, numbness, rash, sore throat, swollen glands, urinary symptoms, vertigo, visual change, vomiting or weakness. Nothing aggravates the symptoms. He has tried nothing for the symptoms. The treatment provided no relief.    Hypertension   This is a chronic problem. The current episode started more than 1 year ago. The problem is unchanged. The problem is controlled. Pertinent negatives include no anxiety, blurred vision, chest pain, headaches, malaise/fatigue, neck pain, orthopnea, palpitations, peripheral edema, PND, shortness of breath or sweats. Risk factors for coronary artery disease include male gender, obesity, dyslipidemia and sedentary lifestyle. Past treatments include angiotensin blockers, calcium channel blockers and diuretics. Current antihypertension treatment includes angiotensin blockers, calcium channel blockers and diuretics. The current treatment provides  moderate improvement. There is no history of angina, kidney disease, CAD/MI, CVA, heart failure, left ventricular hypertrophy, PVD or retinopathy. There is no history of chronic renal disease, coarctation of the aorta, hyperaldosteronism, hypercortisolism, hyperparathyroidism, a hypertension causing med, pheochromocytoma, renovascular disease, sleep apnea or a thyroid problem.   Anemia   Presents for follow-up visit. There has been no abdominal pain, anorexia, bruising/bleeding easily, confusion, fever, leg swelling, light-headedness, malaise/fatigue, pallor, palpitations, paresthesias, pica or weight loss. Signs of blood loss that are not present include hematemesis, hematochezia, melena, menorrhagia and vaginal bleeding. There is no history of chronic renal disease or heart failure. There are no compliance problems.  Compliance with medications is 0-25%.   Depression   Visit Type: follow-up  Patient presents with the following symptoms: anhedonia, decreased concentration, depressed mood and impotence.  Patient is not experiencing: chest pain, choking sensation, compulsions, confusion, dizziness, dry mouth, excessive worry, fatigue, feelings of hopelessness, feelings of worthlessness, hypersomnia, hyperventilation, insomnia, irritability, malaise, memory impairment, muscle tension, nausea, nervousness/anxiety, palpitations, panic, psychomotor agitation, psychomotor retardation, restlessness, shortness of breath, suicidal ideas, suicidal planning, thoughts of death, weight gain and weight loss.  Frequency of symptoms: constantly   Severity: mild   Sleep quality: fair  Nighttime awakenings: none       Review of Systems  Review of Systems   Constitutional: Positive for activity change and fatigue. Negative for appetite change, chills, diaphoresis and fever.   HENT: Positive for congestion. Negative for postnasal drip, rhinorrhea, sinus pressure, sinus pain, sneezing, sore throat, trouble swallowing and voice change.     Respiratory: Negative for cough, choking, chest tightness, shortness of breath, wheezing and stridor.    Cardiovascular: Negative for chest pain.   Gastrointestinal: Positive for blood in stool and rectal pain. Negative for diarrhea, nausea and vomiting.   Musculoskeletal: Positive for arthralgias.   Neurological: Positive for weakness and numbness. Negative for headaches.   Psychiatric/Behavioral: The patient is nervous/anxious.         Depressed mood        Patient Active Problem List   Diagnosis   • Hyperlipidemia   • Impaired fasting glucose    • Vitamin D deficiency    • Depression   • Bereavement   • Anemia   • Iron deficiency anemia   • Need for hepatitis C screening test   • Palpable mass of neck   • Encounter for immunization   • Radiation proctitis   • Diverticulosis of large intestine without hemorrhage   • Lower GI bleeding   • Pruritic rash   • Lipoma of back   • Essential hypertension   • Erectile dysfunction   • Need for vaccination   • Medicare annual wellness visit, initial   • Rectal bleeding   • Change in bowel habits   • General medical examination   • Sleep disorder   • Other fatigue   • Obesity   • Prediabetes   • Encounter for screening for endocrine disorder   • Moderate episode of recurrent major depressive disorder (CMS/HCC)   • Unintentional weight loss   • Encounter for screening for malignant neoplasm of prostate    • History of prostate cancer   • Blood in stool   • Nasal congestion     Past Surgical History:   Procedure Laterality Date   • APPENDECTOMY     • CATARACT EXTRACTION Bilateral 2013   • COLONOSCOPY      diverticulosis in hte sigmoid colon. Altered vascular mucosa in hte rectum. hemorrhoids, no specimens collected, radation proctitis   • COLONOSCOPY N/A 10/19/2017    Procedure: COLONOSCOPY;  Surgeon: Patric Wooten MD;  Location: [facility] ENDOSCOPY;  Service:      Social History     Socioeconomic History   • Marital status:      Spouse name: Not on file   • Number of  children: Not on file   • Years of education: Not on file   • Highest education level: Not on file   Tobacco Use   • Smoking status: Never Smoker   • Smokeless tobacco: Never Used   Substance and Sexual Activity   • Alcohol use: Yes     Comment: 08/30/2018 - Patient reports occasional consumption of alcoholic beverages.   • Drug use: No   • Sexual activity: Defer     Family History   Problem Relation Age of Onset   • Cancer Mother    • Hypertension Brother      Lab on 12/19/2019   Component Date Value Ref Range Status   • WBC 12/19/2019 3.08* 3.40 - 10.80 10*3/mm3 Final   • RBC 12/19/2019 4.15  4.14 - 5.80 10*6/mm3 Final   • Hemoglobin 12/19/2019 12.5* 13.0 - 17.7 g/dL Final   • Hematocrit 12/19/2019 37.8  37.5 - 51.0 % Final   • MCV 12/19/2019 91.1  79.0 - 97.0 fL Final   • MCH 12/19/2019 30.1  26.6 - 33.0 pg Final   • MCHC 12/19/2019 33.1  31.5 - 35.7 g/dL Final   • RDW 12/19/2019 12.7  12.3 - 15.4 % Final   • RDW-SD 12/19/2019 42.0  37.0 - 54.0 fl Final   • MPV 12/19/2019 9.9  6.0 - 12.0 fL Final   • Platelets 12/19/2019 280  140 - 450 10*3/mm3 Final   • Neutrophil % 12/19/2019 44.9  42.7 - 76.0 % Final   • Lymphocyte % 12/19/2019 28.2  19.6 - 45.3 % Final   • Monocyte % 12/19/2019 14.9* 5.0 - 12.0 % Final   • Eosinophil % 12/19/2019 10.4* 0.3 - 6.2 % Final   • Basophil % 12/19/2019 1.3  0.0 - 1.5 % Final   • Immature Grans % 12/19/2019 0.3  0.0 - 0.5 % Final   • Neutrophils, Absolute 12/19/2019 1.38* 1.70 - 7.00 10*3/mm3 Final   • Lymphocytes, Absolute 12/19/2019 0.87  0.70 - 3.10 10*3/mm3 Final   • Monocytes, Absolute 12/19/2019 0.46  0.10 - 0.90 10*3/mm3 Final   • Eosinophils, Absolute 12/19/2019 0.32  0.00 - 0.40 10*3/mm3 Final   • Basophils, Absolute 12/19/2019 0.04  0.00 - 0.20 10*3/mm3 Final   • Immature Grans, Absolute 12/19/2019 0.01  0.00 - 0.05 10*3/mm3 Final   • nRBC 12/19/2019 0.0  0.0 - 0.2 /100 WBC Final   • Glucose 12/19/2019 98  65 - 99 mg/dL Final   • BUN 12/19/2019 18  8 - 23 mg/dL Final   •  "Creatinine 12/19/2019 1.16  0.76 - 1.27 mg/dL Final   • Sodium 12/19/2019 140  136 - 145 mmol/L Final   • Potassium 12/19/2019 3.8  3.5 - 5.2 mmol/L Final   • Chloride 12/19/2019 99  98 - 107 mmol/L Final   • CO2 12/19/2019 29.3* 22.0 - 29.0 mmol/L Final   • Calcium 12/19/2019 9.7  8.6 - 10.5 mg/dL Final   • Total Protein 12/19/2019 7.5  6.0 - 8.5 g/dL Final   • Albumin 12/19/2019 4.10  3.50 - 5.20 g/dL Final   • ALT (SGPT) 12/19/2019 15  1 - 41 U/L Final   • AST (SGOT) 12/19/2019 16  1 - 40 U/L Final   • Alkaline Phosphatase 12/19/2019 78  39 - 117 U/L Final   • Total Bilirubin 12/19/2019 0.4  0.2 - 1.2 mg/dL Final   • eGFR   Amer 12/19/2019 75  >60 mL/min/1.73 Final   • Globulin 12/19/2019 3.4  gm/dL Final   • A/G Ratio 12/19/2019 1.2  g/dL Final   • BUN/Creatinine Ratio 12/19/2019 15.5  7.0 - 25.0 Final   • Anion Gap 12/19/2019 11.7  5.0 - 15.0 mmol/L Final   • Total Cholesterol 12/19/2019 159  0 - 200 mg/dL Final   • Triglycerides 12/19/2019 101  0 - 150 mg/dL Final   • HDL Cholesterol 12/19/2019 55  40 - 60 mg/dL Final   • LDL Cholesterol  12/19/2019 84  0 - 100 mg/dL Final   • VLDL Cholesterol 12/19/2019 20.2  5 - 40 mg/dL Final   • LDL/HDL Ratio 12/19/2019 1.52   Final      No image results found.    [unfilled]  Immunization History   Administered Date(s) Administered   • Flu Vaccine Quad PF >36MO 11/15/2017   • Influenza Split Preservative Free ID 10/20/2016   • Pneumococcal Polysaccharide (PPSV23) 03/31/2017   • Tdap 03/31/2017   • influenza Split 10/20/2016       The following portions of the patient's history were reviewed and updated as appropriate: allergies, current medications, past family history, past medical history, past social history, past surgical history and problem list.        Physical Exam  /78 (BP Location: Left arm, Patient Position: Sitting, Cuff Size: Adult)   Pulse 66   Temp 97.9 °F (36.6 °C) (Oral)   Ht 185.4 cm (73\")   Wt 105 kg (230 lb 6.4 oz)   SpO2 98%   BMI " 30.40 kg/m²     Physical Exam   Constitutional: He is oriented to person, place, and time. He appears well-developed and well-nourished.   HENT:   Head: Normocephalic and atraumatic.   Right Ear: External ear normal.   Eyes: Pupils are equal, round, and reactive to light. Conjunctivae and EOM are normal.   Neck: Normal range of motion. Neck supple.   Cardiovascular: Normal rate, regular rhythm and normal heart sounds.   No murmur heard.  Pulmonary/Chest: Effort normal and breath sounds normal. No respiratory distress.   Abdominal: Soft. Bowel sounds are normal. He exhibits no distension. There is no tenderness.   Obese abdomen    Musculoskeletal: Normal range of motion. He exhibits tenderness. He exhibits no edema or deformity.   Neurological: He is alert and oriented to person, place, and time. No cranial nerve deficit.   Skin: Skin is warm. No rash noted. He is not diaphoretic. No erythema. No pallor.   Psychiatric: He has a normal mood and affect. His behavior is normal.   Nursing note and vitals reviewed.      Assessment/Plan    Diagnosis Plan   1. Essential hypertension     2. Vitamin D deficiency      3. Prediabetes     4. Obesity, unspecified classification, unspecified obesity type, unspecified whether serious comorbidity present     5. Lower GI bleeding     6. Hyperlipidemia, unspecified hyperlipidemia type     7. Episode of recurrent major depressive disorder, unspecified depression episode severity (CMS/HCC)     8. Erectile dysfunction, unspecified erectile dysfunction type     9. Other iron deficiency anemia     10. Rectal bleeding     11. Nasal congestion          -recommend labwork  -nasal congestion - flonase nasal spray   -recommend influenza vaccination   - he declined today   -will get last office note from Hematologist/ Oncologist and Urologist  -history of prostate cancer -Urology following -last PSA was normal   -abnormal weight loss - will get CRP and ESR  -fatigue/sleep disorder -  Pt referred  to sleep study with Dr. Bella but did not complete sleep study. Pt was given number to reschedule sleep study. He could not go to sleep..  He did not reschedule an appt   - Vitamin D deficiency - continue vitamin D pill. Recheck vitamin D levels   - Essential hypertension - stop HCTZ 50 mg daily start on chlorthalidone 524 mg dialy. .   micardis 40 mg Po qdaily and tiazac 360 mg PO q daily.   -overweight counseled weight loss >5 minutes   - hyperlipidemia -Continue pravastatin.  He is not taking aspirin   -rectal ulcer/diverticulosis/ rectal bleeding/history of radiation proctitis - was  on  mesalamine suppository 1000 mg rectal at bedtime.  Recommend high fiber diet. Will make appt with Gastroenterologist.   -depression - pt is back on celexa but will go up on celexa from 20 to 40 mg dialy Pt declines counseling. He has a female  in [place], [place].     -Anemia - Hematologist following . Iron deficiency anemia. Continue iron pill TID. Hematology following.  Have last Hematologist office note.   He had iron infusion therapy  - For mole on back.  Has stuck on appearance. Consider cryotherapy or shave biopsy in the future. Refferral to Dr. Neves for further evaluation was made but mole came off before appt.   -Prevnar 13 vaccination prescription given Pharmacy to give  -advised pt to be safe and call with questions and concerns  -advised pt to go to ER or call 911 if symptoms worrisome or severe  -advised to followup with specialist and referrals         This document has been electronically signed by Abel Hoffman MD on January 29, 2020 3:34 PM

## 2020-01-20 ENCOUNTER — OFFICE VISIT (OUTPATIENT)
Dept: FAMILY MEDICINE CLINIC | Facility: CLINIC | Age: 70
End: 2020-01-20

## 2020-01-20 VITALS
OXYGEN SATURATION: 99 % | WEIGHT: 229 LBS | BODY MASS INDEX: 30.21 KG/M2 | TEMPERATURE: 98 F | SYSTOLIC BLOOD PRESSURE: 138 MMHG | DIASTOLIC BLOOD PRESSURE: 92 MMHG | HEART RATE: 66 BPM

## 2020-01-20 DIAGNOSIS — Z00.00 MEDICARE ANNUAL WELLNESS VISIT, SUBSEQUENT: Primary | ICD-10-CM

## 2020-01-20 PROCEDURE — G0439 PPPS, SUBSEQ VISIT: HCPCS | Performed by: FAMILY MEDICINE

## 2020-01-20 NOTE — PROGRESS NOTES
The ABCs of the Annual Wellness Visit  Subsequent Medicare Wellness Visit    Chief Complaint   Patient presents with   • Annual Exam     MEDICARE WELLNESS EXAM       Subjective   History of Present Illness:  German Ray is a 69 y.o. male who presents for a Subsequent Medicare Wellness Visit.    HEALTH RISK ASSESSMENT    Recent Hospitalizations:  No hospitalization(s) within the last year.    Current Medical Providers:  Patient Care Team:  Abel Hoffman MD as PCP - General  Fox Beth MD as PCP - Claims Attributed  Fox Beth MD as Consulting Physician (Medical Oncology)  Sarita Cotton OD (Optometry)    Smoking Status:  Social History     Tobacco Use   Smoking Status Never Smoker   Smokeless Tobacco Never Used       Alcohol Consumption:  Social History     Substance and Sexual Activity   Alcohol Use Yes    Comment: 08/30/2018 - Patient reports occasional consumption of alcoholic beverages.       Depression Screen:   PHQ-2/PHQ-9 Depression Screening 1/20/2020   Little interest or pleasure in doing things 0   Feeling down, depressed, or hopeless 0   Total Score 0       Fall Risk Screen:  DANKADI Fall Risk Assessment was completed, and patient is at LOW risk for falls.Assessment completed on:1/20/2020    Health Habits and Functional and Cognitive Screening:  Functional & Cognitive Status 1/20/2020   Do you have difficulty preparing food and eating? No   Do you have difficulty bathing yourself, getting dressed or grooming yourself? No   Do you have difficulty using the toilet? No   Do you have difficulty moving around from place to place? No   Do you have trouble with steps or getting out of a bed or a chair? No   Current Diet Well Balanced Diet   Dental Exam Up to date   Eye Exam Up to date   Exercise (times per week) 3 times per week   Current Exercise Activities Include Walking   Do you need help using the phone?  No   Are you deaf or do you have serious difficulty hearing?   No   Do you need help with transportation? No   Do you need help shopping? No   Do you need help preparing meals?  No   Do you need help with housework?  No   Do you need help with laundry? No   Do you need help taking your medications? No   Do you need help managing money? No   Do you ever drive or ride in a car without wearing a seat belt? No   Have you felt unusual stress, anger or loneliness in the last month? Yes   Who do you live with? Alone   If you need help, do you have trouble finding someone available to you? No   Have you been bothered in the last four weeks by sexual problems? -   Do you have difficulty concentrating, remembering or making decisions? No         Does the patient have evidence of cognitive impairment? No    Asprin use counseling:Contraindicated from taking ASA    Age-appropriate Screening Schedule:  Refer to the list below for future screening recommendations based on patient's age, sex and/or medical conditions. Orders for these recommended tests are listed in the plan section. The patient has been provided with a written plan.    Health Maintenance   Topic Date Due   • ZOSTER VACCINE (2 of 2) 01/31/2020 (Originally 2/26/2013)   • LIPID PANEL  12/19/2020   • COLONOSCOPY  08/28/2024   • TDAP/TD VACCINES (2 - Td) 03/31/2027   • INFLUENZA VACCINE  Addressed          The following portions of the patient's history were reviewed and updated as appropriate: allergies, current medications, past family history, past medical history, past social history, past surgical history and problem list.    Outpatient Medications Prior to Visit   Medication Sig Dispense Refill   • calcium carbonate-vitamin d 600-400 MG-UNIT per tablet Take 1 tablet by mouth Daily. 30 tablet 3   • citalopram (CeleXA) 20 MG tablet Take 1 tablet by mouth Daily. 30 tablet 3   • diltiaZEM (TIAZAC) 360 MG 24 hr capsule Take 1 capsule by mouth Daily. 90 capsule 3   • doxazosin (CARDURA) 8 MG tablet Take 1 tablet by mouth Every  Night. 90 tablet 3   • Ferrous Sulfate (IRON) 325 (65 Fe) MG tablet ferrous sulfate 325 mg (65 mg iron) tablet     • hydrochlorothiazide (HYDRODIURIL) 50 MG tablet Take 1 tablet by mouth Daily. 90 tablet 3   • mesalamine (CANASA) 1000 MG suppository Insert 1 suppository into the rectum Every Night. 42 suppository 3   • potassium chloride ER (K-TAB) 20 MEQ tablet controlled-release ER tablet Take 1 tablet by mouth 4 (Four) Times a Day. 360 tablet 3   • pravastatin (PRAVACHOL) 80 MG tablet Take 1 tablet by mouth Every Night. 90 tablet 3   • sodium-potassium-magnesium sulfates (SUPREP BOWEL PREP KIT) 17.5-3.13-1.6 GM/177ML solution oral solution Take 1 bottle by mouth Every 12 (Twelve) Hours. 2 bottle 0   • telmisartan (MICARDIS) 40 MG tablet Take 1 tablet by mouth Daily. 30 tablet 3   • VIAGRA 100 MG tablet Take 1 tablet by mouth Daily As Needed for erectile dysfunction. 10 tablet 3   • vitamin D (ERGOCALCIFEROL) 11484 units capsule capsule Take 1 capsule by mouth Every 7 (Seven) Days. 12 capsule 3     No facility-administered medications prior to visit.        Patient Active Problem List   Diagnosis   • Hyperlipidemia   • Impaired fasting glucose    • Vitamin D deficiency    • Depression   • Bereavement   • Anemia   • Iron deficiency anemia   • Need for hepatitis C screening test   • Palpable mass of neck   • Encounter for immunization   • Radiation proctitis   • Diverticulosis of large intestine without hemorrhage   • Lower GI bleeding   • Pruritic rash   • Lipoma of back   • Essential hypertension   • Erectile dysfunction   • Need for vaccination   • Medicare annual wellness visit, initial   • Rectal bleeding   • Change in bowel habits   • General medical examination   • Sleep disorder   • Other fatigue   • Obesity   • Prediabetes   • Encounter for screening for endocrine disorder   • Moderate episode of recurrent major depressive disorder (CMS/HCC)   • Unintentional weight loss   • Encounter for screening for  malignant neoplasm of prostate    • History of prostate cancer   • Blood in stool       Advanced Care Planning:  Patient has an advance directive - a copy has not been provided. Have asked the patient to send this to us to add to record    Review of Systems    Compared to one year ago, the patient feels his physical health is the same.  Compared to one year ago, the patient feels his mental health is the same.    Reviewed chart for potential of high risk medication in the elderly: yes  Reviewed chart for potential of harmful drug interactions in the elderly:yes    Objective         Vitals:    01/20/20 1004   BP: 138/92   BP Location: Left arm   Patient Position: Sitting   Cuff Size: Adult   Pulse: 66   Temp: 98 °F (36.7 °C)   TempSrc: Oral   SpO2: 99%   Weight: 104 kg (229 lb)   PainSc: 0-No pain       Body mass index is 30.21 kg/m².  Discussed the patient's BMI with him. The BMI is above average; BMI management plan is completed.    Physical Exam    Lab Results   Component Value Date    TRIG 101 12/19/2019    HDL 55 12/19/2019    LDL 84 12/19/2019    VLDL 20.2 12/19/2019        Assessment/Plan   Medicare Risks and Personalized Health Plan  CMS Preventative Services Quick Reference  Abdominal Aortic Aneurysm Screening  Advance Directive Discussion  Alcohol Misuse  Cardiovascular risk  Chronic Pain   Colon Cancer Screening  Dementia/Memory   Depression/Dysphoria  Fall Risk  Glaucoma Risk  Hearing Problem  Inadequate Social Support, Isolation, Loneliness, Lack of Transportation, Financial Difficulties, or Caregiver Stress   Inactivity/Sedentary  Obesity/Overweight   Osteoprorosis Risk    The above risks/problems have been discussed with the patient.  Pertinent information has been shared with the patient in the After Visit Summary.  Follow up plans and orders are seen below in the Assessment/Plan Section.    Diagnoses and all orders for this visit:    1. Medicare annual wellness visit, subsequent (Primary)      Follow  Up:  Return in about 1 year (around 1/20/2021) for Medicare Wellness.     An After Visit Summary and PPPS were given to the patient.     -Action plan discussed please see scanned documents  -repeat in 1 year        This document has been electronically signed by Abel Hoffman MD on January 20, 2020 10:22 AM

## 2020-01-20 NOTE — PATIENT INSTRUCTIONS
Calorie Counting for Weight Loss  Calories are units of energy. Your body needs a certain amount of calories from food to keep you going throughout the day. When you eat more calories than your body needs, your body stores the extra calories as fat. When you eat fewer calories than your body needs, your body burns fat to get the energy it needs.  Calorie counting means keeping track of how many calories you eat and drink each day. Calorie counting can be helpful if you need to lose weight. If you make sure to eat fewer calories than your body needs, you should lose weight. Ask your health care provider what a healthy weight is for you.  For calorie counting to work, you will need to eat the right number of calories in a day in order to lose a healthy amount of weight per week. A dietitian can help you determine how many calories you need in a day and will give you suggestions on how to reach your calorie goal.  · A healthy amount of weight to lose per week is usually 1-2 lb (0.5-0.9 kg). This usually means that your daily calorie intake should be reduced by 500-750 calories.  · Eating 1,200 - 1,500 calories per day can help most women lose weight.  · Eating 1,500 - 1,800 calories per day can help most men lose weight.  What is my plan?  My goal is to have __________ calories per day.  If I have this many calories per day, I should lose around __________ pounds per week.  What do I need to know about calorie counting?  In order to meet your daily calorie goal, you will need to:  · Find out how many calories are in each food you would like to eat. Try to do this before you eat.  · Decide how much of the food you plan to eat.  · Write down what you ate and how many calories it had. Doing this is called keeping a food log.  To successfully lose weight, it is important to balance calorie counting with a healthy lifestyle that includes regular activity. Aim for 150 minutes of moderate exercise (such as walking) or 75  minutes of vigorous exercise (such as running) each week.  Where do I find calorie information?    The number of calories in a food can be found on a Nutrition Facts label. If a food does not have a Nutrition Facts label, try to look up the calories online or ask your dietitian for help.  Remember that calories are listed per serving. If you choose to have more than one serving of a food, you will have to multiply the calories per serving by the amount of servings you plan to eat. For example, the label on a package of bread might say that a serving size is 1 slice and that there are 90 calories in a serving. If you eat 1 slice, you will have eaten 90 calories. If you eat 2 slices, you will have eaten 180 calories.  How do I keep a food log?  Immediately after each meal, record the following information in your food log:  · What you ate. Don't forget to include toppings, sauces, and other extras on the food.  · How much you ate. This can be measured in cups, ounces, or number of items.  · How many calories each food and drink had.  · The total number of calories in the meal.  Keep your food log near you, such as in a small notebook in your pocket, or use a mobile page or website. Some programs will calculate calories for you and show you how many calories you have left for the day to meet your goal.  What are some calorie counting tips?    · Use your calories on foods and drinks that will fill you up and not leave you hungry:  ? Some examples of foods that fill you up are nuts and nut butters, vegetables, lean proteins, and high-fiber foods like whole grains. High-fiber foods are foods with more than 5 g fiber per serving.  ? Drinks such as sodas, specialty coffee drinks, alcohol, and juices have a lot of calories, yet do not fill you up.  · Eat nutritious foods and avoid empty calories. Empty calories are calories you get from foods or beverages that do not have many vitamins or protein, such as candy, sweets, and  "soda. It is better to have a nutritious high-calorie food (such as an avocado) than a food with few nutrients (such as a bag of chips).  · Know how many calories are in the foods you eat most often. This will help you calculate calorie counts faster.  · Pay attention to calories in drinks. Low-calorie drinks include water and unsweetened drinks.  · Pay attention to nutrition labels for \"low fat\" or \"fat free\" foods. These foods sometimes have the same amount of calories or more calories than the full fat versions. They also often have added sugar, starch, or salt, to make up for flavor that was removed with the fat.  · Find a way of tracking calories that works for you. Get creative. Try different apps or programs if writing down calories does not work for you.  What are some portion control tips?  · Know how many calories are in a serving. This will help you know how many servings of a certain food you can have.  · Use a measuring cup to measure serving sizes. You could also try weighing out portions on a kitchen scale. With time, you will be able to estimate serving sizes for some foods.  · Take some time to put servings of different foods on your favorite plates, bowls, and cups so you know what a serving looks like.  · Try not to eat straight from a bag or box. Doing this can lead to overeating. Put the amount you would like to eat in a cup or on a plate to make sure you are eating the right portion.  · Use smaller plates, glasses, and bowls to prevent overeating.  · Try not to multitask (for example, watch TV or use your computer) while eating. If it is time to eat, sit down at a table and enjoy your food. This will help you to know when you are full. It will also help you to be aware of what you are eating and how much you are eating.  What are tips for following this plan?  Reading food labels  · Check the calorie count compared to the serving size. The serving size may be smaller than what you are used to " "eating.  · Check the source of the calories. Make sure the food you are eating is high in vitamins and protein and low in saturated and trans fats.  Shopping  · Read nutrition labels while you shop. This will help you make healthy decisions before you decide to purchase your food.  · Make a grocery list and stick to it.  Cooking  · Try to cook your favorite foods in a healthier way. For example, try baking instead of frying.  · Use low-fat dairy products.  Meal planning  · Use more fruits and vegetables. Half of your plate should be fruits and vegetables.  · Include lean proteins like poultry and fish.  How do I count calories when eating out?  · Ask for smaller portion sizes.  · Consider sharing an entree and sides instead of getting your own entree.  · If you get your own entree, eat only half. Ask for a box at the beginning of your meal and put the rest of your entree in it so you are not tempted to eat it.  · If calories are listed on the menu, choose the lower calorie options.  · Choose dishes that include vegetables, fruits, whole grains, low-fat dairy products, and lean protein.  · Choose items that are boiled, broiled, grilled, or steamed. Stay away from items that are buttered, battered, fried, or served with cream sauce. Items labeled \"crispy\" are usually fried, unless stated otherwise.  · Choose water, low-fat milk, unsweetened iced tea, or other drinks without added sugar. If you want an alcoholic beverage, choose a lower calorie option such as a glass of wine or light beer.  · Ask for dressings, sauces, and syrups on the side. These are usually high in calories, so you should limit the amount you eat.  · If you want a salad, choose a garden salad and ask for grilled meats. Avoid extra toppings like kenney, cheese, or fried items. Ask for the dressing on the side, or ask for olive oil and vinegar or lemon to use as dressing.  · Estimate how many servings of a food you are given. For example, a serving of " cooked rice is ½ cup or about the size of half a baseball. Knowing serving sizes will help you be aware of how much food you are eating at restaurants. The list below tells you how big or small some common portion sizes are based on everyday objects:  ? 1 oz--4 stacked dice.  ? 3 oz--1 deck of cards.  ? 1 tsp--1 die.  ? 1 Tbsp--½ a ping-pong ball.  ? 2 Tbsp--1 ping-pong ball.  ? ½ cup--½ baseball.  ? 1 cup--1 baseball.  Summary  · Calorie counting means keeping track of how many calories you eat and drink each day. If you eat fewer calories than your body needs, you should lose weight.  · A healthy amount of weight to lose per week is usually 1-2 lb (0.5-0.9 kg). This usually means reducing your daily calorie intake by 500-750 calories.  · The number of calories in a food can be found on a Nutrition Facts label. If a food does not have a Nutrition Facts label, try to look up the calories online or ask your dietitian for help.  · Use your calories on foods and drinks that will fill you up, and not on foods and drinks that will leave you hungry.  · Use smaller plates, glasses, and bowls to prevent overeating.  This information is not intended to replace advice given to you by your health care provider. Make sure you discuss any questions you have with your health care provider.  Document Released: 12/18/2006 Document Revised: 09/06/2019 Document Reviewed: 11/17/2017  [employer] Interactive Patient Education © 2019 [employer]  Preventive Care 65 Years and Older, Male  Preventive care refers to lifestyle choices and visits with your health care provider that can promote health and wellness.  What does preventive care include?    · A yearly physical exam. This is also called an annual well check.  · Dental exams once or twice a year.  · Routine eye exams. Ask your health care provider how often you should have your eyes checked.  · Personal lifestyle choices, including:  ? Daily care of your teeth and gums.  ? Regular  physical activity.  ? Eating a healthy diet.  ? Avoiding tobacco and drug use.  ? Limiting alcohol use.  ? Practicing safe sex.  ? Taking low doses of aspirin every day.  ? Taking vitamin and mineral supplements as recommended by your health care provider.  What happens during an annual well check?  The services and screenings done by your health care provider during your annual well check will depend on your age, overall health, lifestyle risk factors, and family history of disease.  Counseling  Your health care provider may ask you questions about your:  · Alcohol use.  · Tobacco use.  · Drug use.  · Emotional well-being.  · Home and relationship well-being.  · Sexual activity.  · Eating habits.  · History of falls.  · Memory and ability to understand (cognition).  · Work and work environment.  Screening  You may have the following tests or measurements:  · Height, weight, and BMI.  · Blood pressure.  · Lipid and cholesterol levels. These may be checked every 5 years, or more frequently if you are over 50 years old.  · Skin check.  · Lung cancer screening. You may have this screening every year starting at age 55 if you have a 30-pack-year history of smoking and currently smoke or have quit within the past 15 years.  · Colorectal cancer screening. All adults should have this screening starting at age 50 and continuing until age 75. You will have tests every 1-10 years, depending on your results and the type of screening test. People at increased risk should start screening at an earlier age. Screening tests may include:  ? Guaiac-based fecal occult blood testing.  ? Fecal immunochemical test (FIT).  ? Stool DNA test.  ? Virtual colonoscopy.  ? Sigmoidoscopy. During this test, a flexible tube with a tiny camera (sigmoidoscope) is used to examine your rectum and lower colon. The sigmoidoscope is inserted through your anus into your rectum and lower colon.  ? Colonoscopy. During this test, a long, thin, flexible tube  with a tiny camera (colonoscope) is used to examine your entire colon and rectum.  · Prostate cancer screening. Recommendations will vary depending on your family history and other risks.  · Hepatitis C blood test.  · Hepatitis B blood test.  · Sexually transmitted disease (STD) testing.  · Diabetes screening. This is done by checking your blood sugar (glucose) after you have not eaten for a while (fasting). You may have this done every 1-3 years.  · Abdominal aortic aneurysm (AAA) screening. You may need this if you are a current or former smoker.  · Osteoporosis. You may be screened starting at age 70 if you are at high risk.  Talk with your health care provider about your test results, treatment options, and if necessary, the need for more tests.  Vaccines  Your health care provider may recommend certain vaccines, such as:  · Influenza vaccine. This is recommended every year.  · Tetanus, diphtheria, and acellular pertussis (Tdap, Td) vaccine. You may need a Td booster every 10 years.  · Varicella vaccine. You may need this if you have not been vaccinated.  · Zoster vaccine. You may need this after age 60.  · Measles, mumps, and rubella (MMR) vaccine. You may need at least one dose of MMR if you were born in 1957 or later. You may also need a second dose.  · Pneumococcal 13-valent conjugate (PCV13) vaccine. One dose is recommended after age 65.  · Pneumococcal polysaccharide (PPSV23) vaccine. One dose is recommended after age 65.  · Meningococcal vaccine. You may need this if you have certain conditions.  · Hepatitis A vaccine. You may need this if you have certain conditions or if you travel or work in places where you may be exposed to hepatitis A.  · Hepatitis B vaccine. You may need this if you have certain conditions or if you travel or work in places where you may be exposed to hepatitis B.  · Haemophilus influenzae type b (Hib) vaccine. You may need this if you have certain risk factors.  Talk to your  health care provider about which screenings and vaccines you need and how often you need them.  This information is not intended to replace advice given to you by your health care provider. Make sure you discuss any questions you have with your health care provider.  Document Released: 01/13/2017 Document Revised: 02/07/2019 Document Reviewed: 10/18/2016  [employer] Interactive Patient Education © 2019 [employer]  Fall Prevention in the Home, Adult  Falls can cause injuries. They can happen to people of all ages. There are many things you can do to make your home safe and to help prevent falls. Ask for help when making these changes, if needed.  What actions can I take to prevent falls?  General Instructions  · Use good lighting in all rooms. Replace any light bulbs that burn out.  · Turn on the lights when you go into a dark area. Use night-lights.  · Keep items that you use often in easy-to-reach places. Lower the shelves around your home if necessary.  · Set up your furniture so you have a clear path. Avoid moving your furniture around.  · Do not have throw rugs and other things on the floor that can make you trip.  · Avoid walking on wet floors.  · If any of your floors are uneven, fix them.  · Add color or contrast paint or tape to clearly lynne and help you see:  ? Any grab bars or handrails.  ? First and last steps of stairways.  ? Where the edge of each step is.  · If you use a stepladder:  ? Make sure that it is fully opened. Do not climb a closed stepladder.  ? Make sure that both sides of the stepladder are locked into place.  ? Ask someone to hold the stepladder for you while you use it.  · If there are any pets around you, be aware of where they are.  What can I do in the bathroom?         · Keep the floor dry. Clean up any water that spills onto the floor as soon as it happens.  · Remove soap buildup in the tub or shower regularly.  · Use non-skid mats or decals on the floor of the tub or  shower.  · Attach bath mats securely with double-sided, non-slip rug tape.  · If you need to sit down in the shower, use a plastic, non-slip stool.  · Install grab bars by the toilet and in the tub and shower. Do not use towel bars as grab bars.  What can I do in the bedroom?  · Make sure that you have a light by your bed that is easy to reach.  · Do not use any sheets or blankets that are too big for your bed. They should not hang down onto the floor.  · Have a firm chair that has side arms. You can use this for support while you get dressed.  What can I do in the kitchen?  · Clean up any spills right away.  · If you need to reach something above you, use a strong step stool that has a grab bar.  · Keep electrical cords out of the way.  · Do not use floor polish or wax that makes floors slippery. If you must use wax, use non-skid floor wax.  What can I do with my stairs?  · Do not leave any items on the stairs.  · Make sure that you have a light switch at the top of the stairs and the bottom of the stairs. If you do not have them, ask someone to add them for you.  · Make sure that there are handrails on both sides of the stairs, and use them. Fix handrails that are broken or loose. Make sure that handrails are as long as the stairways.  · Install non-slip stair treads on all stairs in your home.  · Avoid having throw rugs at the top or bottom of the stairs. If you do have throw rugs, attach them to the floor with carpet tape.  · Choose a carpet that does not hide the edge of the steps on the stairway.  · Check any carpeting to make sure that it is firmly attached to the stairs. Fix any carpet that is loose or worn.  What can I do on the outside of my home?  · Use bright outdoor lighting.  · Regularly fix the edges of walkways and driveways and fix any cracks.  · Remove anything that might make you trip as you walk through a door, such as a raised step or threshold.  · Trim any bushes or trees on the path to your  home.  · Regularly check to see if handrails are loose or broken. Make sure that both sides of any steps have handrails.  · Install guardrails along the edges of any raised decks and porches.  · Clear walking paths of anything that might make someone trip, such as tools or rocks.  · Have any leaves, snow, or ice cleared regularly.  · Use sand or salt on walking paths during winter.  · Clean up any spills in your garage right away. This includes grease or oil spills.  What other actions can I take?  · Wear shoes that:  ? Have a low heel. Do not wear high heels.  ? Have rubber bottoms.  ? Are comfortable and fit you well.  ? Are closed at the toe. Do not wear open-toe sandals.  · Use tools that help you move around (mobility aids) if they are needed. These include:  ? Canes.  ? Walkers.  ? Scooters.  ? Crutches.  · Review your medicines with your doctor. Some medicines can make you feel dizzy. This can increase your chance of falling.  Ask your doctor what other things you can do to help prevent falls.  Where to find more information  · Centers for Disease Control and Prevention, [employer]: https://cdc.gov  · National [place] on Aging: https://ce3cqho.tho.nih.gov  Contact a doctor if:  · You are afraid of falling at home.  · You feel weak, drowsy, or dizzy at home.  · You fall at home.  Summary  · There are many simple things that you can do to make your home safe and to help prevent falls.  · Ways to make your home safe include removing tripping hazards and installing grab bars in the bathroom.  · Ask for help when making these changes in your home.  This information is not intended to replace advice given to you by your health care provider. Make sure you discuss any questions you have with your health care provider.  Document Released: 10/14/2010 Document Revised: 08/02/2018 Document Reviewed: 08/02/2018  [employer] Interactive Patient Education © 2019 [employer]  Exercising to Stay Healthy  To become healthy and  stay healthy, it is recommended that you do moderate-intensity and vigorous-intensity exercise. You can tell that you are exercising at a moderate intensity if your heart starts beating faster and you start breathing faster but can still hold a conversation. You can tell that you are exercising at a vigorous intensity if you are breathing much harder and faster and cannot hold a conversation while exercising.  Exercising regularly is important. It has many health benefits, such as:  · Improving overall fitness, flexibility, and endurance.  · Increasing bone density.  · Helping with weight control.  · Decreasing body fat.  · Increasing muscle strength.  · Reducing stress and tension.  · Improving overall health.  How often should I exercise?  Choose an activity that you enjoy, and set realistic goals. Your health care provider can help you make an activity plan that works for you.  Exercise regularly as told by your health care provider. This may include:  · Doing strength training two times a week, such as:  ? Lifting weights.  ? Using resistance bands.  ? Push-ups.  ? Sit-ups.  ? Yoga.  · Doing a certain intensity of exercise for a given amount of time. Choose from these options:  ? A total of 150 minutes of moderate-intensity exercise every week.  ? A total of 75 minutes of vigorous-intensity exercise every week.  ? A mix of moderate-intensity and vigorous-intensity exercise every week.  Children, pregnant women, people who have not exercised regularly, people who are overweight, and older adults may need to talk with a health care provider about what activities are safe to do. If you have a medical condition, be sure to talk with your health care provider before you start a new exercise program.  What are some exercise ideas?  Moderate-intensity exercise ideas include:  · Walking 1 mile (1.6 km) in about 15 minutes.  · Biking.  · Hiking.  · Golfing.  · Dancing.  · Water aerobics.  Vigorous-intensity exercise  ideas include:  · Walking 4.5 miles (7.2 km) or more in about 1 hour.  · Jogging or running 5 miles (8 km) in about 1 hour.  · Biking 10 miles (16.1 km) or more in about 1 hour.  · Lap swimming.  · Roller-skating or in-line skating.  · Cross-country skiing.  · Vigorous competitive sports, such as football, basketball, and soccer.  · Jumping rope.  · Aerobic dancing.  What are some everyday activities that can help me to get exercise?  · Yard work, such as:  ? Pushing a .  ? Raking and bagging leaves.  · Washing your car.  · Pushing a stroller.  · Shoveling snow.  · Gardening.  · Washing windows or floors.  How can I be more active in my day-to-day activities?  · Use stairs instead of an elevator.  · Take a walk during your lunch break.  · If you drive, park your car farther away from your work or school.  · If you take public transportation, get off one stop early and walk the rest of the way.  · Stand up or walk around during all of your indoor phone calls.  · Get up, stretch, and walk around every 30 minutes throughout the day.  · Enjoy exercise with a friend. Support to continue exercising will help you keep a regular routine of activity.  What guidelines can I follow while exercising?  · Before you start a new exercise program, talk with your health care provider.  · Do not exercise so much that you hurt yourself, feel dizzy, or get very short of breath.  · Wear comfortable clothes and wear shoes with good support.  · Drink plenty of water while you exercise to prevent dehydration or heat stroke.  · Work out until your breathing and your heartbeat get faster.  Where to find more information  · [place] Department of Health and Human Services: www.hhs.gov  · Centers for Disease Control and Prevention (CDC): www.cdc.gov  Summary  · Exercising regularly is important. It will improve your overall fitness, flexibility, and endurance.  · Regular exercise also will improve your overall health. It can help you  control your weight, reduce stress, and improve your bone density.  · Do not exercise so much that you hurt yourself, feel dizzy, or get very short of breath.  · Before you start a new exercise program, talk with your health care provider.  This information is not intended to replace advice given to you by your health care provider. Make sure you discuss any questions you have with your health care provider.  Document Released: 2012 Document Revised: 2018 Document Reviewed: 2018  [employer] Interactive Patient Education © 2019 [employer]    Medicare Wellness  Personal Prevention Plan of Service     Date of Office Visit:  2020  Encounter Provider:  Abel Hoffman MD  Place of Service:  [facility]  Patient Name: German Ray  :  1950    As part of the Medicare Wellness portion of your visit today, we are providing you with this personalized preventive plan of services (PPPS). This plan is based upon recommendations of the [place] Preventive Services Task Force (USPSTF) and the Advisory Committee on Immunization Practices (ACIP).    This lists the preventive care services that should be considered, and provides dates of when you are due. Items listed as completed are up-to-date and do not require any further intervention.    Health Maintenance   Topic Date Due   • MEDICARE ANNUAL WELLNESS  2020   • ZOSTER VACCINE (2 of 2) 2020 (Originally 2013)   • LIPID PANEL  2020   • COLONOSCOPY  2024   • TDAP/TD VACCINES (2 - Td) 2027   • HEPATITIS C SCREENING  Completed   • Pneumococcal Vaccine Once at 65 Years Old  Completed   • INFLUENZA VACCINE  Addressed       No orders of the defined types were placed in this encounter.      Return in about 1 year (around 2021) for Medicare Wellness.

## 2020-01-29 ENCOUNTER — OFFICE VISIT (OUTPATIENT)
Dept: FAMILY MEDICINE CLINIC | Facility: CLINIC | Age: 70
End: 2020-01-29

## 2020-01-29 VITALS
TEMPERATURE: 97.9 F | SYSTOLIC BLOOD PRESSURE: 146 MMHG | HEIGHT: 73 IN | WEIGHT: 230.4 LBS | HEART RATE: 66 BPM | DIASTOLIC BLOOD PRESSURE: 78 MMHG | BODY MASS INDEX: 30.54 KG/M2 | OXYGEN SATURATION: 98 %

## 2020-01-29 DIAGNOSIS — N52.9 ERECTILE DYSFUNCTION, UNSPECIFIED ERECTILE DYSFUNCTION TYPE: ICD-10-CM

## 2020-01-29 DIAGNOSIS — R09.81 NASAL CONGESTION: ICD-10-CM

## 2020-01-29 DIAGNOSIS — D50.8 OTHER IRON DEFICIENCY ANEMIA: ICD-10-CM

## 2020-01-29 DIAGNOSIS — F33.9 EPISODE OF RECURRENT MAJOR DEPRESSIVE DISORDER, UNSPECIFIED DEPRESSION EPISODE SEVERITY (HCC): ICD-10-CM

## 2020-01-29 DIAGNOSIS — E78.5 HYPERLIPIDEMIA, UNSPECIFIED HYPERLIPIDEMIA TYPE: ICD-10-CM

## 2020-01-29 DIAGNOSIS — K62.5 RECTAL BLEEDING: ICD-10-CM

## 2020-01-29 DIAGNOSIS — E55.9 VITAMIN D DEFICIENCY: ICD-10-CM

## 2020-01-29 DIAGNOSIS — K92.2 LOWER GI BLEEDING: ICD-10-CM

## 2020-01-29 DIAGNOSIS — E66.9 OBESITY, UNSPECIFIED CLASSIFICATION, UNSPECIFIED OBESITY TYPE, UNSPECIFIED WHETHER SERIOUS COMORBIDITY PRESENT: ICD-10-CM

## 2020-01-29 DIAGNOSIS — R73.03 PREDIABETES: ICD-10-CM

## 2020-01-29 DIAGNOSIS — I10 ESSENTIAL HYPERTENSION: Primary | ICD-10-CM

## 2020-01-29 PROCEDURE — 99214 OFFICE O/P EST MOD 30 MIN: CPT | Performed by: FAMILY MEDICINE

## 2020-01-29 RX ORDER — PNV NO.95/FERROUS FUM/FOLIC AC 28MG-0.8MG
325 TABLET ORAL
Qty: 30 TABLET | Refills: 3 | Status: SHIPPED | OUTPATIENT
Start: 2020-01-29 | End: 2020-09-16 | Stop reason: SDUPTHER

## 2020-01-29 RX ORDER — ERGOCALCIFEROL 1.25 MG/1
50000 CAPSULE ORAL
Qty: 12 CAPSULE | Refills: 3 | Status: SHIPPED | OUTPATIENT
Start: 2020-01-29 | End: 2020-09-16 | Stop reason: SDUPTHER

## 2020-01-29 RX ORDER — TELMISARTAN 40 MG/1
40 TABLET ORAL DAILY
Qty: 90 TABLET | Refills: 0 | Status: SHIPPED | OUTPATIENT
Start: 2020-01-29 | End: 2020-04-23 | Stop reason: SDUPTHER

## 2020-01-29 RX ORDER — CHLORTHALIDONE 25 MG/1
25 TABLET ORAL DAILY
Qty: 30 TABLET | Refills: 3 | Status: SHIPPED | OUTPATIENT
Start: 2020-01-29 | End: 2020-09-16 | Stop reason: SDUPTHER

## 2020-01-29 RX ORDER — CHLORTHALIDONE 50 MG/1
50 TABLET ORAL DAILY
Qty: 30 TABLET | Refills: 3 | Status: SHIPPED | OUTPATIENT
Start: 2020-01-29 | End: 2020-01-29

## 2020-01-29 RX ORDER — SILDENAFIL CITRATE 100 MG
100 TABLET ORAL DAILY PRN
Qty: 10 TABLET | Refills: 3 | Status: SHIPPED | OUTPATIENT
Start: 2020-01-29 | End: 2020-09-16 | Stop reason: SDUPTHER

## 2020-01-29 RX ORDER — HYDROCHLOROTHIAZIDE 50 MG/1
50 TABLET ORAL DAILY
Qty: 90 TABLET | Refills: 3 | Status: CANCELLED | OUTPATIENT
Start: 2020-01-29

## 2020-01-29 RX ORDER — DOXAZOSIN 8 MG/1
8 TABLET ORAL NIGHTLY
Qty: 90 TABLET | Refills: 3 | Status: SHIPPED | OUTPATIENT
Start: 2020-01-29 | End: 2020-09-16 | Stop reason: SDUPTHER

## 2020-01-29 RX ORDER — FLUTICASONE PROPIONATE 50 MCG
2 SPRAY, SUSPENSION (ML) NASAL DAILY
Qty: 1 BOTTLE | Refills: 3 | Status: SHIPPED | OUTPATIENT
Start: 2020-01-29 | End: 2020-02-28

## 2020-01-29 RX ORDER — CITALOPRAM 20 MG/1
20 TABLET ORAL DAILY
Qty: 90 TABLET | Refills: 0 | Status: SHIPPED | OUTPATIENT
Start: 2020-01-29 | End: 2020-09-16

## 2020-01-29 RX ORDER — PRAVASTATIN SODIUM 80 MG/1
80 TABLET ORAL NIGHTLY
Qty: 90 TABLET | Refills: 3 | Status: SHIPPED | OUTPATIENT
Start: 2020-01-29 | End: 2020-09-16 | Stop reason: SDUPTHER

## 2020-01-29 RX ORDER — DILTIAZEM HYDROCHLORIDE 360 MG/1
360 CAPSULE, EXTENDED RELEASE ORAL DAILY
Qty: 90 CAPSULE | Refills: 3 | Status: SHIPPED | OUTPATIENT
Start: 2020-01-29 | End: 2020-09-16 | Stop reason: SDUPTHER

## 2020-01-29 RX ORDER — POTASSIUM CHLORIDE 1500 MG/1
20 TABLET, FILM COATED, EXTENDED RELEASE ORAL 4 TIMES DAILY
Qty: 360 TABLET | Refills: 3 | Status: SHIPPED | OUTPATIENT
Start: 2020-01-29 | End: 2020-09-16 | Stop reason: SDUPTHER

## 2020-01-29 NOTE — PATIENT INSTRUCTIONS
Stop HCTZ 50 mg daily and start on chlorthalidone    Bring blood pressure readings next visit     Chlorthalidone tablets take 25 mg daily for hypertension.     Recheck in 4 weeks   What is this medicine?  CHLORTHALIDONE (klor THAL i done) is a diuretic. It increases the amount of urine passed, which causes the body to lose salt and water. This medicine is used to treat high blood pressure and edema or water retention.  This medicine may be used for other purposes; ask your health care provider or pharmacist if you have questions.  COMMON BRAND NAME(S): Thalitone  What should I tell my health care provider before I take this medicine?  They need to know if you have any of these conditions:  -asthma  -diabetes  -gout  -kidney disease  -liver disease  -parathyroid disease  -systemic lupus erythematosus (SLE)  -taking cortisone, digoxin, lithium carbonate, or drugs for diabetes  -an unusual or allergic reaction to chlorthalidone, sulfa drugs, other medicines, foods, dyes, or preservatives  -pregnant or trying to get pregnant  -breast-feeding  How should I use this medicine?  Take this medicine by mouth with a glass of water. Follow the directions on the prescription label. It is best to take your dose in the morning with food. Take your medicine at regular intervals. Do not take your medicine more often than directed. Do not stop taking except on your doctor's advice.  Talk to your pediatrician regarding the use of this medicine in children. Special care may be needed.  Overdosage: If you think you have taken too much of this medicine contact a poison control center or emergency room at once.  NOTE: This medicine is only for you. Do not share this medicine with others.  What if I miss a dose?  If you miss a dose, take it as soon as you can. If it is almost time for your next dose, take only that dose. Do not take double or extra doses.  What may interact with this medicine?  -barbiturate medicines for sleep or seizure  control  -digoxin  -lithium  -medicines for diabetes  -norepinephrine  -other medicines for high blood pressure  -some pain medicines  -steroid hormones like prednisone, cortisone, hydrocortisone, corticotropin  -tubocurarine  This list may not describe all possible interactions. Give your health care provider a list of all the medicines, herbs, non-prescription drugs, or dietary supplements you use. Also tell them if you smoke, drink alcohol, or use illegal drugs. Some items may interact with your medicine.  What should I watch for while using this medicine?  Visit your doctor or health care professional for regular check ups. Check your blood pressure as directed. Ask your doctor or health care professional what your blood pressure should be and when you should contact him or her.  You may need to be on a special diet while taking this medicine. Ask your doctor.  You may get drowsy or dizzy. Do not drive, use machinery, or do anything that needs mental alertness until you know how this medicine affects you. Do not stand or sit up quickly, especially if you are an older patient. This reduces the risk of dizzy or fainting spells. Alcohol may interfere with the effect of this medicine. Avoid alcoholic drinks.  This medicine may affect your blood sugar level. If you have diabetes, check with your doctor or health care professional before changing the dose of your diabetic medicine.  This medicine can make you more sensitive to the sun. Keep out of the sun. If you cannot avoid being in the sun, wear protective clothing and use sunscreen. Do not use sun lamps or tanning beds/booths.  What side effects may I notice from receiving this medicine?  Side effects that you should report to your doctor or health care professional as soon as possible:  -allergic reactions like skin rash, itching or hives, swelling of the face, lips, or tongue  -dark urine  -dry mouth  -excess thirst  -fast, irregular heart rate  -fever,  chills  -muscle pain, cramps, or spasm  -nausea, vomiting  -redness, blistering, peeling or loosening of the skin, including inside the mouth  -tingling, pain or numbness in the hands or feet  -unusually weak or tired  -yellowing of the eyes or skin  Side effects that usually do not require medical attention (report to your doctor or health care professional if they continue or are bothersome):  -diarrhea or constipation  -headache  -impotence  -loss of appetite  -stomach upset  This list may not describe all possible side effects. Call your doctor for medical advice about side effects. You may report side effects to FDA at 8-192-JCE-7087.  Where should I keep my medicine?  Keep out of the reach of children.  Store at room temperature between 15 and 30 degrees C (59 and 86 degrees F). Keep container tightly closed. Throw away any unused medicine after the expiration date.  NOTE: This sheet is a summary. It may not cover all possible information. If you have questions about this medicine, talk to your doctor, pharmacist, or health care provider.  © 2019 [employer] (2009-03-24 15:28:48)

## 2020-04-23 ENCOUNTER — TELEPHONE (OUTPATIENT)
Dept: FAMILY MEDICINE CLINIC | Facility: CLINIC | Age: 70
End: 2020-04-23

## 2020-04-23 RX ORDER — TELMISARTAN 40 MG/1
40 TABLET ORAL DAILY
Qty: 90 TABLET | Refills: 1 | Status: SHIPPED | OUTPATIENT
Start: 2020-04-23 | End: 2020-09-16 | Stop reason: SDUPTHER

## 2020-04-23 NOTE — TELEPHONE ENCOUNTER
PT CALLED IN TO HAVE MEDICATION REFILLED.    PT CONTACT: [phone]    PT MEDS: telmisartan (MICARDIS) 40 MG tablet

## 2020-09-16 ENCOUNTER — OFFICE VISIT (OUTPATIENT)
Dept: FAMILY MEDICINE CLINIC | Facility: CLINIC | Age: 70
End: 2020-09-16

## 2020-09-16 VITALS
HEART RATE: 68 BPM | BODY MASS INDEX: 30.62 KG/M2 | HEIGHT: 73 IN | SYSTOLIC BLOOD PRESSURE: 134 MMHG | WEIGHT: 231 LBS | OXYGEN SATURATION: 99 % | DIASTOLIC BLOOD PRESSURE: 76 MMHG | TEMPERATURE: 98.5 F

## 2020-09-16 DIAGNOSIS — I10 ESSENTIAL HYPERTENSION: Primary | ICD-10-CM

## 2020-09-16 DIAGNOSIS — R73.03 PREDIABETES: ICD-10-CM

## 2020-09-16 DIAGNOSIS — E66.9 CLASS 1 OBESITY WITH SERIOUS COMORBIDITY AND BODY MASS INDEX (BMI) OF 30.0 TO 30.9 IN ADULT, UNSPECIFIED OBESITY TYPE: ICD-10-CM

## 2020-09-16 DIAGNOSIS — Z85.46 HISTORY OF PROSTATE CANCER: ICD-10-CM

## 2020-09-16 DIAGNOSIS — D50.9 IRON DEFICIENCY ANEMIA, UNSPECIFIED IRON DEFICIENCY ANEMIA TYPE: ICD-10-CM

## 2020-09-16 DIAGNOSIS — E55.9 VITAMIN D DEFICIENCY: ICD-10-CM

## 2020-09-16 DIAGNOSIS — Z23 NEED FOR IMMUNIZATION AGAINST INFLUENZA: ICD-10-CM

## 2020-09-16 DIAGNOSIS — E78.5 HYPERLIPIDEMIA, UNSPECIFIED HYPERLIPIDEMIA TYPE: ICD-10-CM

## 2020-09-16 DIAGNOSIS — R25.2 MUSCLE CRAMP: ICD-10-CM

## 2020-09-16 DIAGNOSIS — N52.9 ERECTILE DYSFUNCTION, UNSPECIFIED ERECTILE DYSFUNCTION TYPE: ICD-10-CM

## 2020-09-16 PROBLEM — E66.811 CLASS 1 OBESITY WITH SERIOUS COMORBIDITY AND BODY MASS INDEX (BMI) OF 30.0 TO 30.9 IN ADULT: Status: ACTIVE | Noted: 2020-09-16

## 2020-09-16 PROCEDURE — 90694 VACC AIIV4 NO PRSRV 0.5ML IM: CPT | Performed by: FAMILY MEDICINE

## 2020-09-16 PROCEDURE — 99214 OFFICE O/P EST MOD 30 MIN: CPT | Performed by: FAMILY MEDICINE

## 2020-09-16 PROCEDURE — G0008 ADMIN INFLUENZA VIRUS VAC: HCPCS | Performed by: FAMILY MEDICINE

## 2020-09-16 RX ORDER — PRAVASTATIN SODIUM 80 MG/1
80 TABLET ORAL NIGHTLY
Qty: 90 TABLET | Refills: 3 | Status: SHIPPED | OUTPATIENT
Start: 2020-09-16 | End: 2021-09-22 | Stop reason: SDUPTHER

## 2020-09-16 RX ORDER — ERGOCALCIFEROL 1.25 MG/1
50000 CAPSULE ORAL
Qty: 12 CAPSULE | Refills: 3 | Status: SHIPPED | OUTPATIENT
Start: 2020-09-16 | End: 2021-09-22 | Stop reason: SDUPTHER

## 2020-09-16 RX ORDER — POTASSIUM CHLORIDE 1500 MG/1
20 TABLET, FILM COATED, EXTENDED RELEASE ORAL 4 TIMES DAILY
Qty: 360 TABLET | Refills: 3 | Status: SHIPPED | OUTPATIENT
Start: 2020-09-16 | End: 2021-10-13 | Stop reason: SDUPTHER

## 2020-09-16 RX ORDER — CHLORTHALIDONE 25 MG/1
25 TABLET ORAL DAILY
Qty: 30 TABLET | Refills: 3 | Status: SHIPPED | OUTPATIENT
Start: 2020-09-16 | End: 2021-01-20 | Stop reason: SDUPTHER

## 2020-09-16 RX ORDER — PNV NO.95/FERROUS FUM/FOLIC AC 28MG-0.8MG
325 TABLET ORAL
Qty: 30 TABLET | Refills: 3 | Status: SHIPPED | OUTPATIENT
Start: 2020-09-16 | End: 2021-01-20 | Stop reason: SDUPTHER

## 2020-09-16 RX ORDER — DILTIAZEM HYDROCHLORIDE 360 MG/1
360 CAPSULE, EXTENDED RELEASE ORAL DAILY
Qty: 90 CAPSULE | Refills: 3 | Status: SHIPPED | OUTPATIENT
Start: 2020-09-16 | End: 2021-09-22 | Stop reason: SDUPTHER

## 2020-09-16 RX ORDER — TELMISARTAN 40 MG/1
40 TABLET ORAL DAILY
Qty: 90 TABLET | Refills: 1 | Status: SHIPPED | OUTPATIENT
Start: 2020-09-16 | End: 2021-05-19 | Stop reason: SDUPTHER

## 2020-09-16 RX ORDER — SILDENAFIL CITRATE 100 MG
100 TABLET ORAL DAILY PRN
Qty: 10 TABLET | Refills: 3 | Status: SHIPPED | OUTPATIENT
Start: 2020-09-16 | End: 2021-09-22 | Stop reason: SDUPTHER

## 2020-09-16 RX ORDER — DOXAZOSIN 8 MG/1
8 TABLET ORAL NIGHTLY
Qty: 90 TABLET | Refills: 3 | Status: SHIPPED | OUTPATIENT
Start: 2020-09-16 | End: 2021-09-22 | Stop reason: SDUPTHER

## 2020-09-16 NOTE — PATIENT INSTRUCTIONS
Have ONcologist seen office note to clinic    Will refer to URologist Dr. Ferreira in [place]    Get labwork and urine test

## 2020-09-18 ENCOUNTER — LAB (OUTPATIENT)
Dept: LAB | Facility: HOSPITAL | Age: 70
End: 2020-09-18

## 2020-09-18 DIAGNOSIS — Z85.46 HISTORY OF PROSTATE CANCER: ICD-10-CM

## 2020-09-18 DIAGNOSIS — R73.03 PREDIABETES: ICD-10-CM

## 2020-09-18 DIAGNOSIS — D50.9 IRON DEFICIENCY ANEMIA, UNSPECIFIED IRON DEFICIENCY ANEMIA TYPE: ICD-10-CM

## 2020-09-18 DIAGNOSIS — E78.5 HYPERLIPIDEMIA, UNSPECIFIED HYPERLIPIDEMIA TYPE: ICD-10-CM

## 2020-09-18 DIAGNOSIS — E55.9 VITAMIN D DEFICIENCY: ICD-10-CM

## 2020-09-18 DIAGNOSIS — N52.9 ERECTILE DYSFUNCTION, UNSPECIFIED ERECTILE DYSFUNCTION TYPE: ICD-10-CM

## 2020-09-18 DIAGNOSIS — I10 ESSENTIAL HYPERTENSION: ICD-10-CM

## 2020-09-18 DIAGNOSIS — R25.2 MUSCLE CRAMP: ICD-10-CM

## 2020-09-18 PROCEDURE — 80053 COMPREHEN METABOLIC PANEL: CPT

## 2020-09-18 PROCEDURE — 85025 COMPLETE CBC W/AUTO DIFF WBC: CPT

## 2020-09-18 PROCEDURE — 81015 MICROSCOPIC EXAM OF URINE: CPT

## 2020-09-18 PROCEDURE — 84466 ASSAY OF TRANSFERRIN: CPT

## 2020-09-18 PROCEDURE — 82728 ASSAY OF FERRITIN: CPT

## 2020-09-18 PROCEDURE — 83036 HEMOGLOBIN GLYCOSYLATED A1C: CPT

## 2020-09-18 PROCEDURE — 83540 ASSAY OF IRON: CPT

## 2020-09-18 PROCEDURE — 81003 URINALYSIS AUTO W/O SCOPE: CPT

## 2020-09-18 PROCEDURE — 80061 LIPID PANEL: CPT

## 2020-09-18 PROCEDURE — 82607 VITAMIN B-12: CPT

## 2020-09-18 PROCEDURE — 82306 VITAMIN D 25 HYDROXY: CPT

## 2020-09-19 LAB
25(OH)D3 SERPL-MCNC: 54.9 NG/ML (ref 30–100)
ALBUMIN SERPL-MCNC: 3.8 G/DL (ref 3.5–5.2)
ALBUMIN/GLOB SERPL: 1.3 G/DL
ALP SERPL-CCNC: 72 U/L (ref 39–117)
ALT SERPL W P-5'-P-CCNC: 14 U/L (ref 1–41)
ANION GAP SERPL CALCULATED.3IONS-SCNC: 7.6 MMOL/L (ref 5–15)
AST SERPL-CCNC: 18 U/L (ref 1–40)
BACTERIA UR QL AUTO: NORMAL /HPF
BASOPHILS # BLD AUTO: 0.04 10*3/MM3 (ref 0–0.2)
BASOPHILS NFR BLD AUTO: 1.2 % (ref 0–1.5)
BILIRUB SERPL-MCNC: 0.3 MG/DL (ref 0–1.2)
BILIRUB UR QL STRIP: NEGATIVE
BUN SERPL-MCNC: 21 MG/DL (ref 8–23)
BUN/CREAT SERPL: 20.4 (ref 7–25)
CALCIUM SPEC-SCNC: 9.3 MG/DL (ref 8.6–10.5)
CHLORIDE SERPL-SCNC: 107 MMOL/L (ref 98–107)
CHOLEST SERPL-MCNC: 160 MG/DL (ref 0–200)
CLARITY UR: CLEAR
CO2 SERPL-SCNC: 26.4 MMOL/L (ref 22–29)
COLOR UR: YELLOW
CREAT SERPL-MCNC: 1.03 MG/DL (ref 0.76–1.27)
DEPRECATED RDW RBC AUTO: 43.3 FL (ref 37–54)
EOSINOPHIL # BLD AUTO: 0.24 10*3/MM3 (ref 0–0.4)
EOSINOPHIL NFR BLD AUTO: 7.4 % (ref 0.3–6.2)
ERYTHROCYTE [DISTWIDTH] IN BLOOD BY AUTOMATED COUNT: 13.3 % (ref 12.3–15.4)
FERRITIN SERPL-MCNC: 41 NG/ML (ref 30–400)
GFR SERPL CREATININE-BSD FRML MDRD: 86 ML/MIN/1.73
GLOBULIN UR ELPH-MCNC: 3 GM/DL
GLUCOSE SERPL-MCNC: 91 MG/DL (ref 65–99)
GLUCOSE UR STRIP-MCNC: NEGATIVE MG/DL
HBA1C MFR BLD: 5.65 % (ref 4.8–5.6)
HCT VFR BLD AUTO: 33.2 % (ref 37.5–51)
HDLC SERPL-MCNC: 50 MG/DL (ref 40–60)
HGB BLD-MCNC: 11.3 G/DL (ref 13–17.7)
HGB UR QL STRIP.AUTO: NEGATIVE
HYALINE CASTS UR QL AUTO: NORMAL /LPF
IMM GRANULOCYTES # BLD AUTO: 0.01 10*3/MM3 (ref 0–0.05)
IMM GRANULOCYTES NFR BLD AUTO: 0.3 % (ref 0–0.5)
IRON 24H UR-MRATE: 47 MCG/DL (ref 59–158)
IRON SATN MFR SERPL: 15 % (ref 20–50)
KETONES UR QL STRIP: NEGATIVE
LDLC SERPL CALC-MCNC: 96 MG/DL (ref 0–100)
LDLC/HDLC SERPL: 1.92 {RATIO}
LEUKOCYTE ESTERASE UR QL STRIP.AUTO: NEGATIVE
LYMPHOCYTES # BLD AUTO: 0.68 10*3/MM3 (ref 0.7–3.1)
LYMPHOCYTES NFR BLD AUTO: 21.1 % (ref 19.6–45.3)
MCH RBC QN AUTO: 30.1 PG (ref 26.6–33)
MCHC RBC AUTO-ENTMCNC: 34 G/DL (ref 31.5–35.7)
MCV RBC AUTO: 88.3 FL (ref 79–97)
MONOCYTES # BLD AUTO: 0.5 10*3/MM3 (ref 0.1–0.9)
MONOCYTES NFR BLD AUTO: 15.5 % (ref 5–12)
NEUTROPHILS NFR BLD AUTO: 1.76 10*3/MM3 (ref 1.7–7)
NEUTROPHILS NFR BLD AUTO: 54.5 % (ref 42.7–76)
NITRITE UR QL STRIP: NEGATIVE
NRBC BLD AUTO-RTO: 0 /100 WBC (ref 0–0.2)
PH UR STRIP.AUTO: 7.5 [PH] (ref 5–9)
PLATELET # BLD AUTO: 252 10*3/MM3 (ref 140–450)
PMV BLD AUTO: 10.4 FL (ref 6–12)
POTASSIUM SERPL-SCNC: 3.7 MMOL/L (ref 3.5–5.2)
PROT SERPL-MCNC: 6.8 G/DL (ref 6–8.5)
PROT UR QL STRIP: NEGATIVE
RBC # BLD AUTO: 3.76 10*6/MM3 (ref 4.14–5.8)
RBC # UR: NORMAL /HPF
REF LAB TEST METHOD: NORMAL
SODIUM SERPL-SCNC: 141 MMOL/L (ref 136–145)
SP GR UR STRIP: 1.02 (ref 1–1.03)
SQUAMOUS #/AREA URNS HPF: NORMAL /HPF
TIBC SERPL-MCNC: 322 MCG/DL (ref 298–536)
TRANSFERRIN SERPL-MCNC: 216 MG/DL (ref 200–360)
TRIGL SERPL-MCNC: 69 MG/DL (ref 0–150)
UROBILINOGEN UR QL STRIP: NORMAL
VIT B12 BLD-MCNC: 558 PG/ML (ref 211–946)
VLDLC SERPL-MCNC: 13.8 MG/DL (ref 5–40)
WBC # BLD AUTO: 3.23 10*3/MM3 (ref 3.4–10.8)
WBC UR QL AUTO: NORMAL /HPF

## 2020-09-21 ENCOUNTER — TELEPHONE (OUTPATIENT)
Dept: FAMILY MEDICINE CLINIC | Facility: CLINIC | Age: 70
End: 2020-09-21

## 2020-09-21 NOTE — TELEPHONE ENCOUNTER
----- Message from Abel Hoffman MD sent at 9/19/2020  6:35 PM CDT -----  Please call pt    Urine studies normal    Iron levels still low and on cBC hemoglobin is at 11.3 recommend pt go up on ferrous sulfate from 325 mg daily to TID give 90 pills and 3 refills take with Vitamin C for better absorption. May cause constipation and black stools     Also WBC on lower side at 3.23. highly recommend pt followup with Hematologist/Oncologist     On hga1c it is at 5.65 and pt is borderline prediabetic. Recommend pt watch sugar and carb intake \    Vitamin D and b12 levels normal     On CMP kidney function and liver function normal     Recheck on next visit. Thanks

## 2020-10-05 ENCOUNTER — LAB (OUTPATIENT)
Dept: LAB | Facility: HOSPITAL | Age: 70
End: 2020-10-05

## 2020-10-05 DIAGNOSIS — Z85.46 PERSONAL HISTORY OF PROSTATE CANCER: ICD-10-CM

## 2020-10-05 PROCEDURE — 84153 ASSAY OF PSA TOTAL: CPT

## 2020-10-05 NOTE — PROGRESS NOTES
Subjective    Mr. Ray is 69 y.o. male    Chief Complaint: Personal History of prostate Cancer    History of Present Illness  69-year-old male established patient formally seen by me in [place] follow-up for history of prostate cancer status post radiation therapy along with history of erectile dysfunction.  Quality painless.  Severity stable.  PSA drawn 10/5/2020 normal at 0.7.  Associated symptoms he denies gross hematuria or flank pain.  Patient states he has seen some blood in his stool.    The following portions of the patient's history were reviewed and updated as appropriate: allergies, current medications, past family history, past medical history, past social history, past surgical history and problem list.    Review of Systems   Constitutional: Negative for chills and fever.   Gastrointestinal: Negative for abdominal pain, anal bleeding and blood in stool.   Genitourinary: Negative for dysuria, frequency, hematuria and urgency.   All other systems reviewed and are negative.        Current Outpatient Medications:   •  calcium carbonate-vitamin d 600-400 MG-UNIT per tablet, Take 1 tablet by mouth Daily., Disp: 90 tablet, Rfl: 0  •  chlorthalidone (HYGROTON) 25 MG tablet, Take 1 tablet by mouth Daily., Disp: 30 tablet, Rfl: 3  •  dilTIAZem (TIAZAC) 360 MG 24 hr capsule, Take 1 capsule by mouth Daily., Disp: 90 capsule, Rfl: 3  •  doxazosin (CARDURA) 8 MG tablet, Take 1 tablet by mouth Every Night., Disp: 90 tablet, Rfl: 3  •  ferrous sulfate 325 (65 Fe) MG tablet, Take 1 tablet by mouth Daily With Breakfast., Disp: 30 tablet, Rfl: 3  •  potassium chloride ER (K-TAB) 20 MEQ tablet controlled-release ER tablet, Take 1 tablet by mouth 4 (Four) Times a Day., Disp: 360 tablet, Rfl: 3  •  pravastatin (PRAVACHOL) 80 MG tablet, Take 1 tablet by mouth Every Night., Disp: 90 tablet, Rfl: 3  •  telmisartan (MICARDIS) 40 MG tablet, Take 1 tablet by mouth Daily., Disp: 90 tablet, Rfl: 1  •  Viagra 100 MG tablet,  Take 1 tablet by mouth Daily As Needed for Erectile Dysfunction., Disp: 10 tablet, Rfl: 3  •  vitamin D (ERGOCALCIFEROL) 1.25 MG (97944 UT) capsule capsule, Take 1 capsule by mouth Every 7 (Seven) Days., Disp: 12 capsule, Rfl: 3    Past Medical History:   Diagnosis Date   • Anemia    • Depression     Major, single episode, unspecified   • Diverticular disease of colon    • Encounter for screening for diabetes mellitus    • Encounter for screening for endocrine disorder     Suspected   • Hemorrhage of anus and rectum    • Hyperlipidemia    • Hypertension    • Hypokalemia    • Primary malignant neoplasm of prostate (CMS/HCC)     prostate   • Radiation proctitis    • Vitamin D deficiency        Past Surgical History:   Procedure Laterality Date   • APPENDECTOMY     • CATARACT EXTRACTION Bilateral 2013   • COLONOSCOPY      diverticulosis in hte sigmoid colon. Altered vascular mucosa in hte rectum. hemorrhoids, no specimens collected, radation proctitis   • COLONOSCOPY N/A 10/19/2017    Procedure: COLONOSCOPY;  Surgeon: Patric Wooten MD;  Location: [facility] ENDOSCOPY;  Service:        Social History     Socioeconomic History   • Marital status:      Spouse name: Not on file   • Number of children: Not on file   • Years of education: Not on file   • Highest education level: Not on file   Tobacco Use   • Smoking status: Never Smoker   • Smokeless tobacco: Never Used   Substance and Sexual Activity   • Alcohol use: Yes     Comment: 08/30/2018 - Patient reports occasional consumption of alcoholic beverages.   • Drug use: No   • Sexual activity: Defer       Family History   Problem Relation Age of Onset   • Cancer Mother    • Hypertension Brother        Objective    There were no vitals taken for this visit.    Physical Exam  Constitutional: Well nourished, Well developed; No apparent distress.  His vital signs are reviewed  Psychiatric: Appropriate affect; Alert and oriented  Eyes: Unremarkable  Musculoskeletal:  Normal gait and station  GI: Abdomen is soft, non-tender  Respiratory: No distress; Unlabored movement; No accessory musculature needed with symmetric movements  Skin: No pallor or diaphoresis  ; Penis and testicles are normal; Prostate flat and atrophic, no blood on the examining finger.      Results for orders placed or performed in visit on 09/18/20   CBC Auto Differential    Specimen: Blood   Result Value Ref Range    WBC 3.23 (L) 3.40 - 10.80 10*3/mm3    RBC 3.76 (L) 4.14 - 5.80 10*6/mm3    Hemoglobin 11.3 (L) 13.0 - 17.7 g/dL    Hematocrit 33.2 (L) 37.5 - 51.0 %    MCV 88.3 79.0 - 97.0 fL    MCH 30.1 26.6 - 33.0 pg    MCHC 34.0 31.5 - 35.7 g/dL    RDW 13.3 12.3 - 15.4 %    RDW-SD 43.3 37.0 - 54.0 fl    MPV 10.4 6.0 - 12.0 fL    Platelets 252 140 - 450 10*3/mm3    Neutrophil % 54.5 42.7 - 76.0 %    Lymphocyte % 21.1 19.6 - 45.3 %    Monocyte % 15.5 (H) 5.0 - 12.0 %    Eosinophil % 7.4 (H) 0.3 - 6.2 %    Basophil % 1.2 0.0 - 1.5 %    Immature Grans % 0.3 0.0 - 0.5 %    Neutrophils, Absolute 1.76 1.70 - 7.00 10*3/mm3    Lymphocytes, Absolute 0.68 (L) 0.70 - 3.10 10*3/mm3    Monocytes, Absolute 0.50 0.10 - 0.90 10*3/mm3    Eosinophils, Absolute 0.24 0.00 - 0.40 10*3/mm3    Basophils, Absolute 0.04 0.00 - 0.20 10*3/mm3    Immature Grans, Absolute 0.01 0.00 - 0.05 10*3/mm3    nRBC 0.0 0.0 - 0.2 /100 WBC   Comprehensive Metabolic Panel    Specimen: Blood   Result Value Ref Range    Glucose 91 65 - 99 mg/dL    BUN 21 8 - 23 mg/dL    Creatinine 1.03 0.76 - 1.27 mg/dL    Sodium 141 136 - 145 mmol/L    Potassium 3.7 3.5 - 5.2 mmol/L    Chloride 107 98 - 107 mmol/L    CO2 26.4 22.0 - 29.0 mmol/L    Calcium 9.3 8.6 - 10.5 mg/dL    Total Protein 6.8 6.0 - 8.5 g/dL    Albumin 3.80 3.50 - 5.20 g/dL    ALT (SGPT) 14 1 - 41 U/L    AST (SGOT) 18 1 - 40 U/L    Alkaline Phosphatase 72 39 - 117 U/L    Total Bilirubin 0.3 0.0 - 1.2 mg/dL    eGFR  African Amer 86 >60 mL/min/1.73    Globulin 3.0 gm/dL    A/G Ratio 1.3 g/dL     BUN/Creatinine Ratio 20.4 7.0 - 25.0    Anion Gap 7.6 5.0 - 15.0 mmol/L   Hemoglobin A1c    Specimen: Blood   Result Value Ref Range    Hemoglobin A1C 5.65 (H) 4.80 - 5.60 %   Lipid Panel    Specimen: Blood   Result Value Ref Range    Total Cholesterol 160 0 - 200 mg/dL    Triglycerides 69 0 - 150 mg/dL    HDL Cholesterol 50 40 - 60 mg/dL    LDL Cholesterol  96 0 - 100 mg/dL    VLDL Cholesterol 13.8 5 - 40 mg/dL    LDL/HDL Ratio 1.92    Vitamin D 25 Hydroxy    Specimen: Blood   Result Value Ref Range    25 Hydroxy, Vitamin D 54.9 30.0 - 100.0 ng/ml   Iron Profile    Specimen: Blood   Result Value Ref Range    Iron 47 (L) 59 - 158 mcg/dL    Iron Saturation 15 (L) 20 - 50 %    Transferrin 216 200 - 360 mg/dL    TIBC 322 298 - 536 mcg/dL   Ferritin    Specimen: Blood   Result Value Ref Range    Ferritin 41.00 30.00 - 400.00 ng/mL   Vitamin B12    Specimen: Blood   Result Value Ref Range    Vitamin B-12 558 211 - 946 pg/mL   Urinalysis without microscopic (no culture) - Urine, Clean Catch    Specimen: Urine, Clean Catch   Result Value Ref Range    Color, UA Yellow Yellow, Straw, Dark Yellow, Leighann    Appearance, UA Clear Clear    pH, UA 7.5 5.0 - 9.0    Specific Gravity, UA 1.020 1.003 - 1.030    Glucose, UA Negative Negative    Ketones, UA Negative Negative    Bilirubin, UA Negative Negative    Blood, UA Negative Negative    Protein, UA Negative Negative    Leuk Esterase, UA Negative Negative    Nitrite, UA Negative Negative    Urobilinogen, UA 0.2 E.U./dL 0.2 - 1.0 E.U./dL   Urinalysis, Microscopic Only - Urine, Clean Catch    Specimen: Urine, Clean Catch   Result Value Ref Range    RBC, UA 0-2 None Seen, 0-2 /HPF    WBC, UA 0-2 None Seen, 0-2 /HPF    Bacteria, UA None Seen None Seen /HPF    Squamous Epithelial Cells, UA 0-2 None Seen, 0-2 /HPF    Hyaline Casts, UA None Seen None Seen /LPF    Methodology Automated Microscopy      Assessment and Plan    Diagnoses and all orders for this visit:    1. Personal history of  prostate cancer (Primary)  -     POC Urinalysis Dipstick, Multipro  -     PSA Diagnostic; Future  -     PSA DIAGNOSTIC; Future    2. Essential hypertension    3. Erectile dysfunction following radiation therapy    4. Hematochezia      Doing well overall status post radiation therapy for prostate cancer with low/stable PSA.  He will continue the Viagra for his ED.  I did recommend he discuss his blood per rectum with Dr. Hoffman.  Follow-up in 1 year with preclinic PSA or sooner as needed.      This document has been signed by MARLI Ferreira MD on October 13, 2020 20:59 CDT

## 2020-10-07 LAB — PSA SERPL-MCNC: 0.7 NG/ML (ref 0–4)

## 2020-10-12 ENCOUNTER — OFFICE VISIT (OUTPATIENT)
Dept: UROLOGY | Facility: CLINIC | Age: 70
End: 2020-10-12

## 2020-10-12 VITALS — HEIGHT: 73 IN | BODY MASS INDEX: 30.35 KG/M2 | WEIGHT: 229 LBS | TEMPERATURE: 98.6 F

## 2020-10-12 DIAGNOSIS — N52.35 ERECTILE DYSFUNCTION FOLLOWING RADIATION THERAPY: ICD-10-CM

## 2020-10-12 DIAGNOSIS — K92.1 HEMATOCHEZIA: ICD-10-CM

## 2020-10-12 DIAGNOSIS — I10 ESSENTIAL HYPERTENSION: ICD-10-CM

## 2020-10-12 DIAGNOSIS — Z85.46 PERSONAL HISTORY OF PROSTATE CANCER: Primary | ICD-10-CM

## 2020-10-12 LAB
BILIRUB BLD-MCNC: NEGATIVE MG/DL
CLARITY, POC: CLEAR
COLOR UR: YELLOW
GLUCOSE UR STRIP-MCNC: NEGATIVE MG/DL
KETONES UR QL: NEGATIVE
LEUKOCYTE EST, POC: NEGATIVE
NITRITE UR-MCNC: NEGATIVE MG/ML
PH UR: 8.5 [PH] (ref 5–8)
PROT UR STRIP-MCNC: NEGATIVE MG/DL
RBC # UR STRIP: NEGATIVE /UL
SP GR UR: 1.02 (ref 1–1.03)
UROBILINOGEN UR QL: NORMAL

## 2020-10-12 PROCEDURE — 99213 OFFICE O/P EST LOW 20 MIN: CPT | Performed by: UROLOGY

## 2020-10-12 PROCEDURE — 81003 URINALYSIS AUTO W/O SCOPE: CPT | Performed by: UROLOGY

## 2020-10-13 PROBLEM — N52.35 ERECTILE DYSFUNCTION FOLLOWING RADIATION THERAPY: Status: ACTIVE | Noted: 2020-10-13

## 2020-10-13 PROBLEM — K92.1 HEMATOCHEZIA: Status: ACTIVE | Noted: 2020-10-13

## 2021-01-09 NOTE — PROGRESS NOTES
Chief Complaint  Follow-up    Subjective          German Ray presents to [facility] for   History of Present Illness       Pt is 69 yo male with management of being overweight, ED, HTN, major depression, HLP, history of prostate cancer with radiation therapy, iron deficiency anemia, history of rectal bleeding/ulcer, history of radiation proctitis, diverticulosis, vitamin D deficiency, prediabetes, sp appendectomy, sp cataract surgery,      9/16/20 in office visit for recheck on pt's above medical issues. Pt is due for new labwork he was in [place] and will be moving to [place].  He has yet to make an appt with Gastroenterologist. He has upcoming appt with Oncologist soon. He has history of prostate cancer and was seeing Urologist Dr. Ferreira in the past.  At times he may have muscle cramping.  He continues to take his iron pill. Denies any chest pain no dizziness. He did have episode of blood in stool about 1 month ago that has now resolved. Denies any abdominal pain.      1/20/21 in office visit for recheck of pt's above medical issues. Saw  Urology on 10/12/20  For history of prostate cancer, ED.  Pt has low/stable PSA  Pt is due for new labwork.  His last labwork on 9/18/20 showed normal vitamin B12, iron profile showed iron at 47 and iron saturation at 15. Vitamin D was normal  Lipid panel stable. hga1c at 5.65. CMP showed stable kidney function CBC showed hemoglobin at 11.3 .  He has been seeing Dr. Ryan and has pending colonoscopy and EGD for iron deficiency.   He is seeing also Hematology for history of prostate cancer.    He is feeling fair.  No chest pain no dizziness.  He continues to take his medicaitons for HTN and ED, and for his HLP.  He travels back and forth to [place] to see his GF. He is going to move in the future. He is putting his house for sale in [place].         Male  Problem  The patient's pertinent negatives include no genital  injury, genital itching, genital lesions, pelvic pain, penile discharge, penile pain, priapism, scrotal swelling or testicular pain. Primary symptoms comment: history of prostate cancer . This is a chronic problem. The current episode started more than 1 month ago. The problem occurs constantly. The problem has been unchanged. Pertinent negatives include no abdominal pain, anorexia, chest pain, chills, constipation, coughing, diarrhea, discolored urine, dysuria, fever, flank pain, frequency, headaches, hematuria, hesitancy, joint pain, joint swelling, nausea, painful intercourse, rash, shortness of breath, sore throat, urgency, urinary retention or vomiting.   Obesity  This is a chronic problem. The current episode started more than 1 year ago. The problem occurs constantly. The problem has been unchanged. Associated symptoms include arthralgias, fatigue, myalgias, numbness and weakness. Pertinent negatives include no abdominal pain, anorexia, chest pain, chills, congestion, coughing, diaphoresis, fever, headaches, nausea, rash, sore throat or vomiting. Nothing aggravates the symptoms. He has tried nothing for the symptoms. The treatment provided no relief.   Hypertension   This is a chronic problem. The current episode started more than 1 year ago. The problem is unchanged. The problem is controlled. Pertinent negatives include no anxiety, blurred vision, chest pain, headaches, malaise/fatigue, neck pain, orthopnea, palpitations, peripheral edema, PND, shortness of breath or sweats. Risk factors for coronary artery disease include male gender, obesity, dyslipidemia and sedentary lifestyle. Past treatments include angiotensin blockers, calcium channel blockers and diuretics. Current antihypertension treatment includes angiotensin blockers, calcium channel blockers and diuretics. The current treatment provides moderate improvement. There is no history of angina, kidney disease, CAD/MI, CVA, heart failure, left  "ventricular hypertrophy, PVD or retinopathy. There is no history of chronic renal disease, coarctation of the aorta, hyperaldosteronism, hypercortisolism, hyperparathyroidism, a hypertension causing med, pheochromocytoma, renovascular disease, sleep apnea or a thyroid problem.   Anemia   Presents for follow-up visit. There has been no abdominal pain, anorexia, bruising/bleeding easily, confusion, fever, leg swelling, light-headedness, malaise/fatigue, pallor, palpitations, paresthesias, pica or weight loss. Signs of blood loss that are not present include hematemesis, hematochezia, melena, menorrhagia and vaginal bleeding. There is no history of chronic renal disease or heart failure. There are no compliance problems.  Compliance with medications is 0-25%.        Objective   Vital Signs:   /74 (BP Location: Left arm, Patient Position: Sitting, Cuff Size: Large Adult)   Pulse 70   Temp 98.6 °F (37 °C)   Ht 185.4 cm (73\")   Wt 104 kg (229 lb)   SpO2 96%   BMI 30.21 kg/m²     There were no vitals taken for this visit.    Physical Exam  Vitals signs and nursing note reviewed.   Constitutional:       Appearance: He is well-developed. He is not diaphoretic.   HENT:      Head: Normocephalic and atraumatic.      Right Ear: External ear normal.   Eyes:      Conjunctiva/sclera: Conjunctivae normal.      Pupils: Pupils are equal, round, and reactive to light.   Neck:      Musculoskeletal: Normal range of motion and neck supple.   Cardiovascular:      Rate and Rhythm: Normal rate and regular rhythm.      Heart sounds: Normal heart sounds. No murmur.   Pulmonary:      Effort: Pulmonary effort is normal. No respiratory distress.      Breath sounds: Normal breath sounds.   Abdominal:      General: Bowel sounds are normal. There is no distension.      Palpations: Abdomen is soft.      Tenderness: There is abdominal tenderness.   Musculoskeletal: Normal range of motion.         General: Tenderness present. No deformity. "   Skin:     General: Skin is warm.      Coloration: Skin is not pale.      Findings: No erythema or rash.   Neurological:      Mental Status: He is alert and oriented to person, place, and time.      Cranial Nerves: No cranial nerve deficit.   Psychiatric:         Behavior: Behavior normal.        Result Review :                 Assessment and Plan    Problem List Items Addressed This Visit        Cardiac and Vasculature    Hyperlipidemia    Relevant Orders    CBC Auto Differential    Comprehensive Metabolic Panel    Hemoglobin A1c    Lipid Panel    Vitamin D 25 Hydroxy    Iron Profile    Ferritin    Essential hypertension - Primary    Relevant Orders    CBC Auto Differential    Comprehensive Metabolic Panel    Hemoglobin A1c    Lipid Panel    Vitamin D 25 Hydroxy    Iron Profile    Ferritin       Endocrine and Metabolic    Vitamin D deficiency     Relevant Orders    CBC Auto Differential    Comprehensive Metabolic Panel    Hemoglobin A1c    Lipid Panel    Vitamin D 25 Hydroxy    Iron Profile    Ferritin    Prediabetes    Relevant Orders    CBC Auto Differential    Comprehensive Metabolic Panel    Hemoglobin A1c    Lipid Panel    Vitamin D 25 Hydroxy    Iron Profile    Ferritin    Class 1 obesity with serious comorbidity and body mass index (BMI) of 30.0 to 30.9 in adult    Relevant Orders    CBC Auto Differential    Comprehensive Metabolic Panel    Hemoglobin A1c    Lipid Panel    Vitamin D 25 Hydroxy    Iron Profile    Ferritin       Genitourinary and Reproductive     Encounter for screening for malignant neoplasm of prostate     Relevant Orders    CBC Auto Differential    Comprehensive Metabolic Panel    Hemoglobin A1c    Lipid Panel    Vitamin D 25 Hydroxy    Iron Profile    Ferritin    Erectile dysfunction following radiation therapy    Relevant Orders    CBC Auto Differential    Comprehensive Metabolic Panel    Hemoglobin A1c    Lipid Panel    Vitamin D 25 Hydroxy    Iron Profile    Ferritin       Hematology  and Neoplasia    History of prostate cancer    Relevant Orders    CBC Auto Differential    Comprehensive Metabolic Panel    Hemoglobin A1c    Lipid Panel    Vitamin D 25 Hydroxy    Iron Profile    Ferritin      Other Visit Diagnoses     Iron deficiency        Relevant Orders    CBC Auto Differential    Comprehensive Metabolic Panel    Hemoglobin A1c    Lipid Panel    Vitamin D 25 Hydroxy    Iron Profile    Ferritin         -recommend labwork  -recommend influenza vaccination   -  Given today   -recommend shingles vaccination   -muscle cramps - check b12 levels   -nasal congestion - flonase nasal spray   -history of prostate cancer -Urology following -last PSA was normal. Will refer back to Dr. Ferreira in [place]. Oncology following   -fatigue/sleep disorder -  Pt referred to sleep study with Dr. Bella but did not complete sleep study. Pt was given number to reschedule sleep study. He could not go to sleep..  He did not reschedule an appt   -Vitamin D deficiency - continue vitamin D pill. Recheck vitamin D levels   -Essential hypertension -  Controlled on chlorthalidone 25  mg dialy. .  on micardis 40 mg PO q daily. And dilitzem 360 mg PO q daily.    -obesity  - counseled weight loss >5 minutes BMI at 30.21   -hyperlipidemia -Continue pravastatin.  He is not taking aspirin   -rectal ulcer/diverticulosis/ rectal bleeding/history of radiation proctitis - Gi following was on  mesalamine suppository 1000 mg rectal at bedtime.  Recommend high fiber diet.  he will need to call for an appt   -depression - off celexa 20 mg PO q daily.   -Normocytic Anemia - Hematologist following . Iron deficiency anemia. Continue iron pill TID. Hematology following.  Have last Hematologist office note.   He had iron infusion therapy  -mole on back.  Has stuck on appearance. Consider cryotherapy or shave biopsy in the future. Refferral to Dr. Neves for further evaluation was made but mole came off before appt. e  -advised pt to be safe  and call with questions and concerns  -advised pt to go to ER or call 911 if symptoms worrisome or severe  -advised to followup with specialist and referrals   -total time with pt >25 minutes         This document has been electronically signed by Abel Hoffman MD on January 20, 2021 14:46 CST        Follow Up   Return in about 3 months (around 4/20/2021).  Patient was given instructions and counseling regarding his condition or for health maintenance advice. Please see specific information pulled into the AVS if appropriate.

## 2021-01-15 NOTE — PROGRESS NOTES
The ABCs of the Annual Wellness Visit  Subsequent Medicare Wellness Visit    No chief complaint on file.      Subjective   History of Present Illness:  German Ray is a 70 y.o. male who presents for a Subsequent Medicare Wellness Visit.    HEALTH RISK ASSESSMENT    Recent Hospitalizations:  No hospitalization(s) within the last year.    Current Medical Providers:  Patient Care Team:  Abel Hoffman MD as PCP - General  Fox Beth MD as Consulting Physician (Medical Oncology)  Sarita Cotton OD (Optometry)    Smoking Status:  Social History     Tobacco Use   Smoking Status Never Smoker   Smokeless Tobacco Never Used       Alcohol Consumption:  Social History     Substance and Sexual Activity   Alcohol Use Yes    Comment: 08/30/2018 - Patient reports occasional consumption of alcoholic beverages.       Depression Screen:   PHQ-2/PHQ-9 Depression Screening 1/20/2020   Little interest or pleasure in doing things 0   Feeling down, depressed, or hopeless 0   Total Score 0       Fall Risk Screen:  DANKADI Fall Risk Assessment has not been completed.    Health Habits and Functional and Cognitive Screening:  Functional & Cognitive Status 1/20/2020   Do you have difficulty preparing food and eating? No   Do you have difficulty bathing yourself, getting dressed or grooming yourself? No   Do you have difficulty using the toilet? No   Do you have difficulty moving around from place to place? No   Do you have trouble with steps or getting out of a bed or a chair? No   Current Diet Well Balanced Diet   Dental Exam Up to date        Dental Exam Comment Pennyrile Family Denistry   Eye Exam Up to date        Eye Exam Comment Gabbie Perdue   Exercise (times per week) 3 times per week   Current Exercise Activities Include Walking   Do you need help using the phone?  No   Are you deaf or do you have serious difficulty hearing?  No   Do you need help with transportation? No   Do you need help shopping? No     Do you need help preparing meals?  No   Do you need help with housework?  No   Do you need help with laundry? No   Do you need help taking your medications? No   Do you need help managing money? No   Do you ever drive or ride in a car without wearing a seat belt? No   Have you felt unusual stress, anger or loneliness in the last month? Yes   Who do you live with? Alone   If you need help, do you have trouble finding someone available to you? No   Have you been bothered in the last four weeks by sexual problems? -   Do you have difficulty concentrating, remembering or making decisions? No         Does the patient have evidence of cognitive impairment? No    Asprin use counseling:Does not need ASA (and currently is not on it)    Age-appropriate Screening Schedule:  Refer to the list below for future screening recommendations based on patient's age, sex and/or medical conditions. Orders for these recommended tests are listed in the plan section. The patient has been provided with a written plan.    Health Maintenance   Topic Date Due   • ZOSTER VACCINE (2 of 2) 02/26/2013   • LIPID PANEL  09/18/2021   • COLONOSCOPY  08/28/2024   • TDAP/TD VACCINES (2 - Td) 03/31/2027   • INFLUENZA VACCINE  Completed          The following portions of the patient's history were reviewed and updated as appropriate: allergies, current medications, past family history, past medical history, past social history, past surgical history and problem list.    Outpatient Medications Prior to Visit   Medication Sig Dispense Refill   • calcium carbonate-vitamin d 600-400 MG-UNIT per tablet Take 1 tablet by mouth Daily. 90 tablet 0   • chlorthalidone (HYGROTON) 25 MG tablet Take 1 tablet by mouth Daily. 30 tablet 3   • dilTIAZem (TIAZAC) 360 MG 24 hr capsule Take 1 capsule by mouth Daily. 90 capsule 3   • doxazosin (CARDURA) 8 MG tablet Take 1 tablet by mouth Every Night. 90 tablet 3   • ferrous sulfate 325 (65 Fe) MG tablet Take 1 tablet by mouth  Daily With Breakfast. 30 tablet 3   • potassium chloride ER (K-TAB) 20 MEQ tablet controlled-release ER tablet Take 1 tablet by mouth 4 (Four) Times a Day. 360 tablet 3   • pravastatin (PRAVACHOL) 80 MG tablet Take 1 tablet by mouth Every Night. 90 tablet 3   • telmisartan (MICARDIS) 40 MG tablet Take 1 tablet by mouth Daily. 90 tablet 1   • Viagra 100 MG tablet Take 1 tablet by mouth Daily As Needed for Erectile Dysfunction. 10 tablet 3   • vitamin D (ERGOCALCIFEROL) 1.25 MG (15756 UT) capsule capsule Take 1 capsule by mouth Every 7 (Seven) Days. 12 capsule 3     No facility-administered medications prior to visit.        Patient Active Problem List   Diagnosis   • Hyperlipidemia   • Impaired fasting glucose    • Vitamin D deficiency    • Depression   • Bereavement   • Anemia   • Iron deficiency anemia   • Need for hepatitis C screening test   • Palpable mass of neck   • Encounter for immunization   • Radiation proctitis   • Diverticulosis of large intestine without hemorrhage   • Lower GI bleeding   • Pruritic rash   • Lipoma of back   • Essential hypertension   • Erectile dysfunction   • Need for vaccination   • Medicare annual wellness visit, initial   • Rectal bleeding   • Change in bowel habits   • General medical examination   • Sleep disorder   • Other fatigue   • Obesity   • Prediabetes   • Encounter for screening for endocrine disorder   • Moderate episode of recurrent major depressive disorder (CMS/HCC)   • Unintentional weight loss   • Encounter for screening for malignant neoplasm of prostate    • History of prostate cancer   • Blood in stool   • Nasal congestion   • Class 1 obesity with serious comorbidity and body mass index (BMI) of 30.0 to 30.9 in adult   • Erectile dysfunction following radiation therapy   • Hematochezia       Advanced Care Planning:  ACP discussion was held with the patient during this visit. Patient has an advance directive (not in EMR), copy requested.    Review of  Systems    Compared to one year ago, the patient feels his physical health is the same.  Compared to one year ago, the patient feels his mental health is the same.    Reviewed chart for potential of high risk medication in the elderly: yes  Reviewed chart for potential of harmful drug interactions in the elderly:yes    Objective       There were no vitals filed for this visit.    There is no height or weight on file to calculate BMI.  Discussed the patient's BMI with him. The BMI is above average; BMI management plan is completed.    Physical Exam          Assessment/Plan   Medicare Risks and Personalized Health Plan  CMS Preventative Services Quick Reference  Abdominal Aortic Aneurysm Screening  Advance Directive Discussion  Alcohol Misuse  Cardiovascular risk  Chronic Pain   Colon Cancer Screening  Dementia/Memory   Depression/Dysphoria  Diabetic Lab Screening   Fall Risk  Glaucoma Risk  Hearing Problem  Inadequate Social Support, Isolation, Loneliness, Lack of Transportation, Financial Difficulties, or Caregiver Stress   Inactivity/Sedentary  Lung Cancer Risk  Polypharmacy  Prostate Cancer Screening   Urinary Incontinence    The above risks/problems have been discussed with the patient.  Pertinent information has been shared with the patient in the After Visit Summary.  Follow up plans and orders are seen below in the Assessment/Plan Section.    There are no diagnoses linked to this encounter.  Follow Up:  No follow-ups on file.     An After Visit Summary and PPPS were given to the patient.       -Action plan discussed. Please see scanned documents  -repeat in 1 year         This document has been electronically signed by Abel Hoffman MD on January 25, 2021 10:17 CST

## 2021-01-20 ENCOUNTER — OFFICE VISIT (OUTPATIENT)
Dept: FAMILY MEDICINE CLINIC | Facility: CLINIC | Age: 71
End: 2021-01-20

## 2021-01-20 VITALS
BODY MASS INDEX: 30.35 KG/M2 | HEIGHT: 73 IN | WEIGHT: 229 LBS | HEART RATE: 70 BPM | SYSTOLIC BLOOD PRESSURE: 126 MMHG | DIASTOLIC BLOOD PRESSURE: 74 MMHG | OXYGEN SATURATION: 96 % | TEMPERATURE: 98.6 F

## 2021-01-20 DIAGNOSIS — N52.35 ERECTILE DYSFUNCTION FOLLOWING RADIATION THERAPY: ICD-10-CM

## 2021-01-20 DIAGNOSIS — E55.9 VITAMIN D DEFICIENCY: ICD-10-CM

## 2021-01-20 DIAGNOSIS — I10 ESSENTIAL HYPERTENSION: Primary | ICD-10-CM

## 2021-01-20 DIAGNOSIS — E78.2 MIXED HYPERLIPIDEMIA: ICD-10-CM

## 2021-01-20 DIAGNOSIS — E66.9 CLASS 1 OBESITY WITH SERIOUS COMORBIDITY AND BODY MASS INDEX (BMI) OF 30.0 TO 30.9 IN ADULT, UNSPECIFIED OBESITY TYPE: ICD-10-CM

## 2021-01-20 DIAGNOSIS — E61.1 IRON DEFICIENCY: ICD-10-CM

## 2021-01-20 DIAGNOSIS — R73.03 PREDIABETES: ICD-10-CM

## 2021-01-20 DIAGNOSIS — Z12.5 ENCOUNTER FOR SCREENING FOR MALIGNANT NEOPLASM OF PROSTATE: ICD-10-CM

## 2021-01-20 DIAGNOSIS — Z85.46 HISTORY OF PROSTATE CANCER: ICD-10-CM

## 2021-01-20 PROCEDURE — 99214 OFFICE O/P EST MOD 30 MIN: CPT | Performed by: FAMILY MEDICINE

## 2021-01-20 RX ORDER — PNV NO.95/FERROUS FUM/FOLIC AC 28MG-0.8MG
325 TABLET ORAL
Qty: 90 TABLET | Refills: 1 | Status: SHIPPED | OUTPATIENT
Start: 2021-01-20 | End: 2021-06-21 | Stop reason: SDUPTHER

## 2021-01-20 RX ORDER — CHLORTHALIDONE 25 MG/1
25 TABLET ORAL DAILY
Qty: 90 TABLET | Refills: 1 | Status: SHIPPED | OUTPATIENT
Start: 2021-01-20 | End: 2021-05-19 | Stop reason: SDUPTHER

## 2021-01-22 ENCOUNTER — TELEPHONE (OUTPATIENT)
Dept: FAMILY MEDICINE CLINIC | Facility: CLINIC | Age: 71
End: 2021-01-22

## 2021-01-25 ENCOUNTER — OFFICE VISIT (OUTPATIENT)
Dept: FAMILY MEDICINE CLINIC | Facility: CLINIC | Age: 71
End: 2021-01-25

## 2021-01-25 VITALS
HEIGHT: 73 IN | OXYGEN SATURATION: 97 % | BODY MASS INDEX: 30.88 KG/M2 | SYSTOLIC BLOOD PRESSURE: 120 MMHG | TEMPERATURE: 98.4 F | HEART RATE: 74 BPM | WEIGHT: 233 LBS | DIASTOLIC BLOOD PRESSURE: 68 MMHG

## 2021-01-25 DIAGNOSIS — Z00.00 MEDICARE ANNUAL WELLNESS VISIT, SUBSEQUENT: Primary | ICD-10-CM

## 2021-01-25 PROCEDURE — G0439 PPPS, SUBSEQ VISIT: HCPCS | Performed by: FAMILY MEDICINE

## 2021-01-25 NOTE — PATIENT INSTRUCTIONS
Medicare Wellness  Personal Prevention Plan of Service     Date of Office Visit:  2021  Encounter Provider:  Abel Hoffman MD  Place of Service:  [facility]  Patient Name: German Ray  :  1950    As part of the Medicare Wellness portion of your visit today, we are providing you with this personalized preventive plan of services (PPPS). This plan is based upon recommendations of the [place] Preventive Services Task Force (USPSTF) and the Advisory Committee on Immunization Practices (ACIP).    This lists the preventive care services that should be considered, and provides dates of when you are due. Items listed as completed are up-to-date and do not require any further intervention.    Health Maintenance   Topic Date Due   • ZOSTER VACCINE (2 of 2) 2013   • LIPID PANEL  2021   • ANNUAL WELLNESS VISIT  2022   • COLONOSCOPY  2024   • TDAP/TD VACCINES (2 - Td) 2027   • HEPATITIS C SCREENING  Completed   • INFLUENZA VACCINE  Completed   • Pneumococcal Vaccine 65+  Completed   • MENINGOCOCCAL VACCINE  Aged Out       No orders of the defined types were placed in this encounter.      Return in about 1 year (around 2022) for Medicare Wellness.

## 2021-05-19 ENCOUNTER — TELEPHONE (OUTPATIENT)
Dept: FAMILY MEDICINE CLINIC | Facility: CLINIC | Age: 71
End: 2021-05-19

## 2021-05-20 RX ORDER — TELMISARTAN 40 MG/1
40 TABLET ORAL DAILY
Qty: 90 TABLET | Refills: 1 | Status: SHIPPED | OUTPATIENT
Start: 2021-05-20 | End: 2021-06-10 | Stop reason: SDUPTHER

## 2021-05-20 RX ORDER — CHLORTHALIDONE 25 MG/1
25 TABLET ORAL DAILY
Qty: 90 TABLET | Refills: 1 | Status: SHIPPED | OUTPATIENT
Start: 2021-05-20 | End: 2021-06-10 | Stop reason: SDUPTHER

## 2021-06-07 NOTE — PROGRESS NOTES
Chief Complaint  Hyperlipidemia, Hypertension, Vitamin D Deficiency, Anemia, Depression, and Fatigue    Subjective          German Ray presents to [facility]     Pt is 71 yo male with management of being overweight, ED, HTN, major depression, HLP, history of prostate cancer with radiation therapy, iron deficiency anemia, history of rectal bleeding/ulcer, history of radiation proctitis, diverticulosis, vitamin D deficiency, prediabetes, sp appendectomy, sp cataract surgery, gastritis       1/20/21 in office visit for recheck of pt's above medical issues. Saw  Urology on 10/12/20  For history of prostate cancer, ED.  Pt has low/stable PSA  Pt is due for new labwork.  His last labwork on 9/18/20 showed normal vitamin B12, iron profile showed iron at 47 and iron saturation at 15. Vitamin D was normal  Lipid panel stable. hga1c at 5.65. CMP showed stable kidney function CBC showed hemoglobin at 11.3 .  He has been seeing Dr. Ryan and has pending colonoscopy and EGD for iron deficiency.   He is seeing also Hematology for history of prostate cancer.    He is feeling fair.  No chest pain no dizziness.  He continues to take his medicaitons for HTN and ED, and for his HLP.  He travels back and forth to [place] to see his GF. He is going to move in the future. He is putting his house for sale in [place].     6/21/21 in office visit for recheck on pt's above medical issues. Pt has yet to get labwork ordered on 1/20/21. Pt continues to take his medications for his HTN, hypokalemia, HLP iron deficiency anemia and ED.  I have recent report from Urology in [place]  On 6/16/21 in regards to his history of prostate cancer and iron deficiency anemia. Pt still has intermittent rectal bleeding, BP is stable today.  Pt is doing fine. No chest pain no dizziness.  He is undecided to move to [place]. He lost 3 lbs since last visit .  He is stressed with son living with him who is an adult.         Hyperlipidemia  This is a chronic problem. The current episode started more than 1 year ago. The problem is controlled. Recent lipid tests were reviewed and are variable. He has no history of chronic renal disease, diabetes, hypothyroidism, liver disease, obesity or nephrotic syndrome. The current treatment provides moderate improvement of lipids. There are no compliance problems.  Risk factors for coronary artery disease include hypertension, male sex, a sedentary lifestyle and dyslipidemia.   Male  Problem  The patient's pertinent negatives include no genital injury, genital itching, genital lesions, pelvic pain, penile discharge, penile pain, priapism, scrotal swelling or testicular pain. Primary symptoms comment: history of prostate cancer . This is a chronic problem. The current episode started more than 1 month ago. The problem occurs constantly. The problem has been unchanged. Pertinent negatives include no abdominal pain, anorexia, chest pain, chills, constipation, coughing, diarrhea, discolored urine, dysuria, fever, flank pain, frequency, headaches, hematuria, hesitancy, joint pain, joint swelling, nausea, painful intercourse, rash, shortness of breath, sore throat, urgency, urinary retention or vomiting.   Hypertension   This is a chronic problem. The current episode started more than 1 year ago. The problem is unchanged. The problem is controlled. Pertinent negatives include no anxiety, blurred vision, chest pain, headaches, malaise/fatigue, neck pain, orthopnea, palpitations, peripheral edema, PND, shortness of breath or sweats. Risk factors for coronary artery disease include male gender, obesity, dyslipidemia and sedentary lifestyle. Past treatments include angiotensin blockers, calcium channel blockers and diuretics. Current antihypertension treatment includes angiotensin blockers, calcium channel blockers and diuretics. The current treatment provides moderate improvement. There is no  "history of angina, kidney disease, CAD/MI, CVA, heart failure, left ventricular hypertrophy, PVD or retinopathy. There is no history of chronic renal disease, coarctation of the aorta, hyperaldosteronism, hypercortisolism, hyperparathyroidism, a hypertension causing med, pheochromocytoma, renovascular disease, sleep apnea or a thyroid problem.   Anemia   Presents for follow-up visit. There has been no abdominal pain, anorexia, bruising/bleeding easily, confusion, fever, leg swelling, light-headedness, malaise/fatigue, pallor, palpitations, paresthesias, pica or weight loss. Signs of blood loss that are not present include hematemesis, hematochezia, melena, menorrhagia and vaginal bleeding. There is no history of chronic renal disease or heart failure. There are no compliance problems.  Compliance with medications is 0-25%.       Objective   Vital Signs:   /58 (BP Location: Left arm, Patient Position: Sitting, Cuff Size: Large Adult)   Pulse 58   Temp 98.6 °F (37 °C)   Ht 185.4 cm (73\")   Wt 103 kg (226 lb)   SpO2 98%   BMI 29.82 kg/m²         Physical Exam  Vitals and nursing note reviewed.   Constitutional:       Appearance: He is well-developed. He is not diaphoretic.   HENT:      Head: Normocephalic and atraumatic.      Right Ear: External ear normal.   Eyes:      Conjunctiva/sclera: Conjunctivae normal.      Pupils: Pupils are equal, round, and reactive to light.   Cardiovascular:      Rate and Rhythm: Normal rate and regular rhythm.      Heart sounds: Normal heart sounds. No murmur heard.     Pulmonary:      Effort: Pulmonary effort is normal. No respiratory distress.      Breath sounds: Normal breath sounds.   Abdominal:      General: Bowel sounds are normal. There is no distension.      Palpations: Abdomen is soft.      Tenderness: There is no abdominal tenderness.   Musculoskeletal:         General: No deformity. Normal range of motion.      Cervical back: Normal range of motion and neck supple. "   Skin:     General: Skin is warm.      Coloration: Skin is not pale.      Findings: No erythema or rash.   Neurological:      Mental Status: He is alert and oriented to person, place, and time.      Cranial Nerves: No cranial nerve deficit.   Psychiatric:         Behavior: Behavior normal.        Result Review :                 Assessment and Plan    Diagnoses and all orders for this visit:    1. Essential hypertension (Primary)    2. Vitamin D deficiency     3. Prediabetes    4. Mixed hyperlipidemia    5. Iron deficiency anemia, unspecified iron deficiency anemia type    6. Erectile dysfunction, unspecified erectile dysfunction type    7. History of prostate cancer    8. Overweight    Other orders  -     ferrous sulfate 325 (65 Fe) MG tablet; Take 1 tablet by mouth 3 (Three) Times a Day With Meals.  Dispense: 270 tablet; Refill: 1  -     telmisartan (MICARDIS) 40 MG tablet; Take 1 tablet by mouth Daily.  Dispense: 90 tablet; Refill: 1         -recommend labwork  -recommend influenza vaccination   -  Given today   -recommend shingles vaccination   -ED - on viagra 100 mg PO PRN.    -history of prostate cancer -Urology in [facility] -last PSA was normal.  -fatigue/sleep disorder -  Pt referred to sleep study with Dr. Bella but did not complete sleep study. Pt was given number to reschedule sleep study  -Vitamin D deficiency - continue vitamin D pill. Recheck vitamin D levels    hypertension -  Controlled on chlorthalidone 25  mg dialy. on micardis 40 mg PO q daily. And dilitzem 360 mg PO q daily.  on cardura 8 mg PO qhs.     -overweight - counseled weight loss >5 minutes BMI at 29.82    -hyperlipidemia -Continue pravastatin.  recommend heart healthy diet   -rectal ulcer/diverticulosis/ rectal bleeding/history of radiation proctitis - GI following was on  mesalamine suppository 1000 mg rectal at bedtime.  Recommend high fiber diet.  he will let me know if symptoms have worsening   -depression - off  celexa 20 mg PO q daily.   -iron deficiency anemia - Hematologist following.on ferrous sulfate 325 mg PO TID   -mole on back.  Has stuck on appearance. Consider cryotherapy or shave biopsy in the future. Refferral to Dr. Neves for further evaluation was made but mole came off before appt.   -advised pt to be safe and call with questions and concerns  -advised pt to go to ER or call 911 if symptoms worrisome or severe  -advised to followup with specialist and referrals   I spent 30  minutes caring for German on this date of service. This time includes time spent by me in the following activities: preparing for the visit, reviewing tests, obtaining and/or reviewing a separately obtained history, performing a medically appropriate examination and/or evaluation, counseling and educating the patient/family/caregiver, ordering medications, tests, or procedures, referring and communicating with other health care professionals, documenting information in the medical record, independently interpreting results and communicating that information with the patient/family/caregiver and care coordination.   -recheck in 3 monhts         This document has been electronically signed by Abel Hoffman MD on June 21, 2021 14:24 CDT          Follow Up   No follow-ups on file.  Patient was given instructions and counseling regarding his condition or for health maintenance advice. Please see specific information pulled into the AVS if appropriate.

## 2021-06-10 RX ORDER — CHLORTHALIDONE 25 MG/1
25 TABLET ORAL DAILY
Qty: 90 TABLET | Refills: 1 | Status: SHIPPED | OUTPATIENT
Start: 2021-06-10 | End: 2021-09-22 | Stop reason: SDUPTHER

## 2021-06-10 RX ORDER — TELMISARTAN 40 MG/1
40 TABLET ORAL DAILY
Qty: 90 TABLET | Refills: 1 | Status: SHIPPED | OUTPATIENT
Start: 2021-06-10 | End: 2021-06-14 | Stop reason: SDUPTHER

## 2021-06-14 RX ORDER — TELMISARTAN 40 MG/1
40 TABLET ORAL DAILY
Qty: 90 TABLET | Refills: 1 | Status: SHIPPED | OUTPATIENT
Start: 2021-06-14 | End: 2021-06-21 | Stop reason: SDUPTHER

## 2021-06-21 ENCOUNTER — OFFICE VISIT (OUTPATIENT)
Dept: FAMILY MEDICINE CLINIC | Facility: CLINIC | Age: 71
End: 2021-06-21

## 2021-06-21 ENCOUNTER — LAB (OUTPATIENT)
Dept: LAB | Facility: HOSPITAL | Age: 71
End: 2021-06-21

## 2021-06-21 VITALS
HEIGHT: 73 IN | BODY MASS INDEX: 29.95 KG/M2 | TEMPERATURE: 98.6 F | SYSTOLIC BLOOD PRESSURE: 118 MMHG | WEIGHT: 226 LBS | DIASTOLIC BLOOD PRESSURE: 58 MMHG | OXYGEN SATURATION: 98 % | HEART RATE: 58 BPM

## 2021-06-21 DIAGNOSIS — E78.2 MIXED HYPERLIPIDEMIA: ICD-10-CM

## 2021-06-21 DIAGNOSIS — I10 ESSENTIAL HYPERTENSION: ICD-10-CM

## 2021-06-21 DIAGNOSIS — N52.9 ERECTILE DYSFUNCTION, UNSPECIFIED ERECTILE DYSFUNCTION TYPE: ICD-10-CM

## 2021-06-21 DIAGNOSIS — E66.9 CLASS 1 OBESITY WITH SERIOUS COMORBIDITY AND BODY MASS INDEX (BMI) OF 30.0 TO 30.9 IN ADULT, UNSPECIFIED OBESITY TYPE: ICD-10-CM

## 2021-06-21 DIAGNOSIS — R73.03 PREDIABETES: ICD-10-CM

## 2021-06-21 DIAGNOSIS — E55.9 VITAMIN D DEFICIENCY: ICD-10-CM

## 2021-06-21 DIAGNOSIS — E66.3 OVERWEIGHT: ICD-10-CM

## 2021-06-21 DIAGNOSIS — Z85.46 PERSONAL HISTORY OF PROSTATE CANCER: ICD-10-CM

## 2021-06-21 DIAGNOSIS — Z85.46 HISTORY OF PROSTATE CANCER: ICD-10-CM

## 2021-06-21 DIAGNOSIS — I10 ESSENTIAL HYPERTENSION: Primary | ICD-10-CM

## 2021-06-21 DIAGNOSIS — D50.9 IRON DEFICIENCY ANEMIA, UNSPECIFIED IRON DEFICIENCY ANEMIA TYPE: ICD-10-CM

## 2021-06-21 DIAGNOSIS — N52.35 ERECTILE DYSFUNCTION FOLLOWING RADIATION THERAPY: ICD-10-CM

## 2021-06-21 DIAGNOSIS — Z12.5 ENCOUNTER FOR SCREENING FOR MALIGNANT NEOPLASM OF PROSTATE: ICD-10-CM

## 2021-06-21 DIAGNOSIS — E61.1 IRON DEFICIENCY: ICD-10-CM

## 2021-06-21 PROCEDURE — 82306 VITAMIN D 25 HYDROXY: CPT

## 2021-06-21 PROCEDURE — 80061 LIPID PANEL: CPT

## 2021-06-21 PROCEDURE — 84466 ASSAY OF TRANSFERRIN: CPT

## 2021-06-21 PROCEDURE — 83036 HEMOGLOBIN GLYCOSYLATED A1C: CPT

## 2021-06-21 PROCEDURE — 84153 ASSAY OF PSA TOTAL: CPT

## 2021-06-21 PROCEDURE — 80053 COMPREHEN METABOLIC PANEL: CPT

## 2021-06-21 PROCEDURE — 83540 ASSAY OF IRON: CPT

## 2021-06-21 PROCEDURE — 82728 ASSAY OF FERRITIN: CPT

## 2021-06-21 PROCEDURE — 99214 OFFICE O/P EST MOD 30 MIN: CPT | Performed by: FAMILY MEDICINE

## 2021-06-21 PROCEDURE — 85025 COMPLETE CBC W/AUTO DIFF WBC: CPT

## 2021-06-21 RX ORDER — OMEPRAZOLE 20 MG/1
CAPSULE, DELAYED RELEASE ORAL
COMMUNITY
Start: 2021-05-27 | End: 2021-09-22

## 2021-06-21 RX ORDER — TELMISARTAN 40 MG/1
40 TABLET ORAL DAILY
Qty: 90 TABLET | Refills: 1 | Status: SHIPPED | OUTPATIENT
Start: 2021-06-21 | End: 2021-09-22 | Stop reason: SDUPTHER

## 2021-06-21 RX ORDER — PNV NO.95/FERROUS FUM/FOLIC AC 28MG-0.8MG
325 TABLET ORAL
Qty: 270 TABLET | Refills: 1 | Status: SHIPPED | OUTPATIENT
Start: 2021-06-21 | End: 2021-09-22 | Stop reason: SDUPTHER

## 2021-06-21 NOTE — PATIENT INSTRUCTIONS
Preventing Type 2 Diabetes Mellitus  Type 2 diabetes (type 2 diabetes mellitus) is a long-term (chronic) disease that affects blood sugar (glucose) levels. Normally, a hormone called insulin allows glucose to enter cells in the body. The cells use glucose for energy. In type 2 diabetes, one or both of these problems may be present:  · The body does not make enough insulin.  · The body does not respond properly to insulin that it makes (insulin resistance).  Insulin resistance or lack of insulin causes excess glucose to build up in the blood instead of going into cells. As a result, high blood glucose (hyperglycemia) develops, which can cause many complications. Being overweight or obese and having an inactive (sedentary) lifestyle can increase your risk for diabetes. Type 2 diabetes can be delayed or prevented by making certain nutrition and lifestyle changes.  What nutrition changes can be made?    · Eat healthy meals and snacks regularly. Keep a healthy snack with you for when you get hungry between meals, such as fruit or a handful of nuts.  · Eat lean meats and proteins that are low in saturated fats, such as chicken, fish, egg whites, and beans. Avoid processed meats.  · Eat plenty of fruits and vegetables and plenty of grains that have not been processed (whole grains). It is recommended that you eat:  ? 1½?2 cups of fruit every day.  ? 2½?3 cups of vegetables every day.  ? 6?8 oz of whole grains every day, such as oats, whole wheat, bulgur, brown rice, quinoa, and millet.  · Eat low-fat dairy products, such as milk, yogurt, and cheese.  · Eat foods that contain healthy fats, such as nuts, avocado, olive oil, and canola oil.  · Drink water throughout the day. Avoid drinks that contain added sugar, such as soda or sweet tea.  · Follow instructions from your health care provider about specific eating or drinking restrictions.  · Control how much food you eat at a time (portion size).  ? Check food labels to find  out the serving sizes of foods.  ? Use a kitchen scale to weigh amounts of foods.  · Saute or steam food instead of frying it. Cook with water or broth instead of oils or butter.  · Limit your intake of:  ? Salt (sodium). Have no more than 1 tsp (2,400 mg) of sodium a day. If you have heart disease or high blood pressure, have less than ½?¾ tsp (1,500 mg) of sodium a day.  ? Saturated fat. This is fat that is solid at room temperature, such as butter or fat on meat.  What lifestyle changes can be made?  Activity    · Do moderate-intensity physical activity for at least 30 minutes on at least 5 days of the week, or as much as told by your health care provider.  · Ask your health care provider what activities are safe for you. A mix of physical activities may be best, such as walking, swimming, cycling, and strength training.  · Try to add physical activity into your day. For example:  ? Park in spots that are farther away than usual, so that you walk more. For example, park in a far corner of the parking lot when you go to the office or the grocery store.  ? Take a walk during your lunch break.  ? Use stairs instead of elevators or escalators.  Weight Loss  · Lose weight as directed. Your health care provider can determine how much weight loss is best for you and can help you lose weight safely.  · If you are overweight or obese, you may be instructed to lose at least 5?7 % of your body weight.  Alcohol and Tobacco    · Limit alcohol intake to no more than 1 drink a day for nonpregnant women and 2 drinks a day for men. One drink equals 12 oz of beer, 5 oz of wine, or 1½ oz of hard liquor.  · Do not use any tobacco products, such as cigarettes, chewing tobacco, and e-cigarettes. If you need help quitting, ask your health care provider.  Work With Your Health Care Provider  · Have your blood glucose tested regularly, as told by your health care provider.  · Discuss your risk factors and how you can reduce your risk for  diabetes.  · Get screening tests as told by your health care provider. You may have screening tests regularly, especially if you have certain risk factors for type 2 diabetes.  · Make an appointment with a diet and nutrition specialist (registered dietitian). A registered dietitian can help you make a healthy eating plan and can help you understand portion sizes and food labels.  Why are these changes important?  · It is possible to prevent or delay type 2 diabetes and related health problems by making lifestyle and nutrition changes.  · It can be difficult to recognize signs of type 2 diabetes. The best way to avoid possible damage to your body is to take actions to prevent the disease before you develop symptoms.  What can happen if changes are not made?  · Your blood glucose levels may keep increasing. Having high blood glucose for a long time is dangerous. Too much glucose in your blood can damage your blood vessels, heart, kidneys, nerves, and eyes.  · You may develop prediabetes or type 2 diabetes. Type 2 diabetes can lead to many chronic health problems and complications, such as:  ? Heart disease.  ? Stroke.  ? Blindness.  ? Kidney disease.  ? Depression.  ? Poor circulation in the feet and legs, which could lead to surgical removal (amputation) in severe cases.  Where to find support  · Ask your health care provider to recommend a registered dietitian, diabetes educator, or weight loss program.  · Look for local or online weight loss groups.  · Join a gym, fitness club, or outdoor activity group, such as a walking club.  Where to find more information  To learn more about diabetes and diabetes prevention, visit:  · American Diabetes Association (ADA): www.diabetes.org  · National [place] of Diabetes and Digestive and Kidney Diseases: www.niddk.nih.gov/health-information/diabetes  To learn more about healthy eating, visit:  · The U.S. Department of Agriculture (USDA), Choose My Plate:  www.Snehtamyplate.gov/food-groups  · Office of Disease Prevention and Health Promotion (ODPHP), Dietary Guidelines: www.health.gov/dietaryguidelines  Summary  · You can reduce your risk for type 2 diabetes by increasing your physical activity, eating healthy foods, and losing weight as directed.  · Talk with your health care provider about your risk for type 2 diabetes. Ask about any blood tests or screening tests that you need to have.  This information is not intended to replace advice given to you by your health care provider. Make sure you discuss any questions you have with your health care provider.  Document Revised: 04/10/2020 Document Reviewed: 02/07/2017  [employer] Patient Education © 2020 [employer]    Prediabetes  Prediabetes is the condition of having a blood sugar or blood glucose level that is higher than it should be but not high enough for you to be diagnosed with type 2 diabetes (type 2 diabetes mellitus). Having prediabetes puts you at risk for developing type 2 diabetes. Prediabetes may be called impaired glucose tolerance or impaired fasting glucose.  Prediabetes usually does not cause symptoms. Your health care provider can diagnose this condition with blood tests. You may be tested for prediabetes if you are overweight and if you have at least one other risk factor for prediabetes. With certain lifestyle changes, you may be able to delay or prevent type 2 diabetes.  What is blood glucose and how is it measured?  Blood glucose refers to the amount of glucose in your bloodstream. Glucose comes from eating foods that contain sugars and starches (carbohydrates). The body breaks down these carbohydrates into glucose. Your blood glucose level may be measured in milligrams per deciliter (mg/dL) or millimoles per liter (mmol/L). Your blood glucose may be checked with one or more of the following blood tests:  · A fasting blood glucose (FBG) test. You will not be allowed to eat for 8 hours or longer  before a blood sample is taken.  ? A normal range for FBG is  mg/dL (3.9-5.6 mmol/L).  · An A1c blood test (hemoglobin A1c). This test provides information about blood glucose control over the previous 2?3 months.  · An oral glucose tolerance test (OGTT). This test measures your blood glucose at two points in time:  ? After fasting. This is your baseline level.  ? Two hours after you drink a beverage that contains glucose.  You may be diagnosed with prediabetes if:  · Your FBG is 100?125 mg/dL (5.6-6.9 mmol/L).  · Your A1c level is 5.7?6.4% (39-46 mmol/mol).  · Your OGTT result is 140?199 mg/dL (7.8-11 mmol/L).  These blood tests may be repeated to confirm your diagnosis.  How can this condition affect me?  The pancreas makes a hormone called insulin that helps to move glucose from the bloodstream into cells. Insulin resistance develops when cells in the body do not respond properly to insulin that the body makes. When this happens, excess glucose builds up in the blood instead of going into cells. As a result, high blood glucose (hyperglycemia) can develop, which can cause many complications. Hyperglycemia is a symptom of prediabetes.  Having high blood glucose for a long time is dangerous. Too much glucose in your blood can damage nerves and blood vessels. Long-term damage can lead to complications from diabetes, which may include:  · Heart disease.  · Stroke.  · Blindness.  · Kidney disease.  · Depression.  · Poor circulation in the feet and legs. In severe cases, this could lead to surgical removal of a leg (amputation).  What can increase my risk?  You are more likely to develop this condition if:  · You have a family member with type 2 diabetes.  · You are older than 45 years.  · You have gotten diabetes during a pregnancy (gestational diabetes) or have had polycystic ovary syndrome (PCOS). PCOS affects the hormones that regulate reproduction in women.  · You are overweight.  · You live a life without  physical activity or exercise (sedentary lifestyle).  · You have a history of heart disease, including problems with cholesterol levels, high levels of blood fats, or high blood pressure.  What actions can I take to prevent diabetes?  Nutrition    · Follow a healthy meal plan. This includes eating lean proteins, whole grains, legumes, fresh fruits and vegetables, low-fat dairy products, and healthy fats.  · Follow instructions from your health care provider about eating or drinking restrictions.  · Make an appointment to see a dietitian who can help you create a healthy eating plan that is right for you.  Lifestyle  · Be physically active.  ? Do moderate-intensity physical activity for 30 or more minutes on 5 or more days of the week, or as much as told by your health care provider. This could be brisk walking, biking, or water aerobics.  ? Ask your health care provider what activities are safe for you. A mix of physical activities may be best, such as walking, swimming, biking, and strength training.  · Lose weight as told by your health care provider.  ? Losing 5-7% of your body weight can reverse insulin resistance.  ? Your health care provider can determine how much weight loss is best for you. He or she can also help you lose weight safely.  · Do not drink alcohol if:  ? Your health care provider tells you not to drink.  ? You are pregnant, may be pregnant, or are planning to become pregnant.  · If you drink alcohol:  ? Limit how much you use to:  § 0-1 drink a day for women.  § 0-2 drinks a day for men.  ? Be aware of how much alcohol is in your drink. In the [place] one drink equals one 12 oz bottle of beer (355 mL), one 5 oz glass of wine (148 mL), or one 1½ oz glass of hard liquor (44 mL).  General information  · Do not use any products that contain nicotine or tobacco, such as cigarettes, e-cigarettes, and chewing tobacco. If you need help quitting, ask your health care provider.  · Take over-the-counter and  prescription medicines as told by your health care provider. You may be prescribed medicines that help lower the risk of type 2 diabetes.  · Keep all follow-up visits as told by your health care provider. This is important.  Where to find more information  American Diabetes Association: www.diabetes.org  Academy of Nutrition and Dietetics: www.eatright.org  American Heart Association: www.heart.org  Summary  · Prediabetes is the condition of having a blood sugar (blood glucose) level that is higher than it should be but not high enough for you to be diagnosed with type 2 diabetes.  · Having prediabetes puts you at risk for developing type 2 diabetes (type 2 diabetes mellitus).  · To help prevent type 2 diabetes, make lifestyle changes such as eating a healthy diet and being physically active. Lose weight as told by your health care provider.  This information is not intended to replace advice given to you by your health care provider. Make sure you discuss any questions you have with your health care provider.  Document Revised: 11/28/2020 Document Reviewed: 11/28/2020  [employer] Patient Education © 2021 [employer]

## 2021-06-22 LAB
25(OH)D3 SERPL-MCNC: 60.9 NG/ML (ref 30–100)
ALBUMIN SERPL-MCNC: 4.2 G/DL (ref 3.5–5.2)
ALBUMIN/GLOB SERPL: 1.4 G/DL
ALP SERPL-CCNC: 89 U/L (ref 39–117)
ALT SERPL W P-5'-P-CCNC: 30 U/L (ref 1–41)
ANION GAP SERPL CALCULATED.3IONS-SCNC: 11.1 MMOL/L (ref 5–15)
AST SERPL-CCNC: 25 U/L (ref 1–40)
BASOPHILS # BLD AUTO: 0.04 10*3/MM3 (ref 0–0.2)
BASOPHILS NFR BLD AUTO: 0.9 % (ref 0–1.5)
BILIRUB SERPL-MCNC: 0.3 MG/DL (ref 0–1.2)
BUN SERPL-MCNC: 20 MG/DL (ref 8–23)
BUN/CREAT SERPL: 16.7 (ref 7–25)
CALCIUM SPEC-SCNC: 9.7 MG/DL (ref 8.6–10.5)
CHLORIDE SERPL-SCNC: 102 MMOL/L (ref 98–107)
CHOLEST SERPL-MCNC: 186 MG/DL (ref 0–200)
CO2 SERPL-SCNC: 27.9 MMOL/L (ref 22–29)
CREAT SERPL-MCNC: 1.2 MG/DL (ref 0.76–1.27)
DEPRECATED RDW RBC AUTO: 47.9 FL (ref 37–54)
EOSINOPHIL # BLD AUTO: 0.08 10*3/MM3 (ref 0–0.4)
EOSINOPHIL NFR BLD AUTO: 1.8 % (ref 0.3–6.2)
ERYTHROCYTE [DISTWIDTH] IN BLOOD BY AUTOMATED COUNT: 14.4 % (ref 12.3–15.4)
FERRITIN SERPL-MCNC: 357 NG/ML (ref 30–400)
GFR SERPL CREATININE-BSD FRML MDRD: 72 ML/MIN/1.73
GLOBULIN UR ELPH-MCNC: 3.1 GM/DL
GLUCOSE SERPL-MCNC: 76 MG/DL (ref 65–99)
HBA1C MFR BLD: 5.49 % (ref 4.8–5.6)
HCT VFR BLD AUTO: 41.5 % (ref 37.5–51)
HDLC SERPL-MCNC: 65 MG/DL (ref 40–60)
HGB BLD-MCNC: 13.9 G/DL (ref 13–17.7)
IMM GRANULOCYTES # BLD AUTO: 0.03 10*3/MM3 (ref 0–0.05)
IMM GRANULOCYTES NFR BLD AUTO: 0.7 % (ref 0–0.5)
LDLC SERPL CALC-MCNC: 103 MG/DL (ref 0–100)
LDLC/HDLC SERPL: 1.54 {RATIO}
LYMPHOCYTES # BLD AUTO: 1.02 10*3/MM3 (ref 0.7–3.1)
LYMPHOCYTES NFR BLD AUTO: 23.3 % (ref 19.6–45.3)
MCH RBC QN AUTO: 30.8 PG (ref 26.6–33)
MCHC RBC AUTO-ENTMCNC: 33.5 G/DL (ref 31.5–35.7)
MCV RBC AUTO: 91.8 FL (ref 79–97)
MONOCYTES # BLD AUTO: 0.4 10*3/MM3 (ref 0.1–0.9)
MONOCYTES NFR BLD AUTO: 9.2 % (ref 5–12)
NEUTROPHILS NFR BLD AUTO: 2.8 10*3/MM3 (ref 1.7–7)
NEUTROPHILS NFR BLD AUTO: 64.1 % (ref 42.7–76)
NRBC BLD AUTO-RTO: 0.2 /100 WBC (ref 0–0.2)
PLATELET # BLD AUTO: 269 10*3/MM3 (ref 140–450)
PMV BLD AUTO: 10.1 FL (ref 6–12)
POTASSIUM SERPL-SCNC: 3.3 MMOL/L (ref 3.5–5.2)
PROT SERPL-MCNC: 7.3 G/DL (ref 6–8.5)
PSA SERPL-MCNC: 0.72 NG/ML (ref 0–4)
RBC # BLD AUTO: 4.52 10*6/MM3 (ref 4.14–5.8)
SODIUM SERPL-SCNC: 141 MMOL/L (ref 136–145)
TRIGL SERPL-MCNC: 103 MG/DL (ref 0–150)
VLDLC SERPL-MCNC: 18 MG/DL (ref 5–40)
WBC # BLD AUTO: 4.37 10*3/MM3 (ref 3.4–10.8)

## 2021-06-23 ENCOUNTER — TELEPHONE (OUTPATIENT)
Dept: FAMILY MEDICINE CLINIC | Facility: CLINIC | Age: 71
End: 2021-06-23

## 2021-06-23 LAB
IRON 24H UR-MRATE: 50 MCG/DL (ref 59–158)
IRON SATN MFR SERPL: 17 % (ref 20–50)
TIBC SERPL-MCNC: 289 MCG/DL (ref 298–536)
TRANSFERRIN SERPL-MCNC: 194 MG/DL (ref 200–360)

## 2021-06-23 RX ORDER — POTASSIUM CHLORIDE 20 MEQ/1
20 TABLET, EXTENDED RELEASE ORAL 2 TIMES DAILY
Qty: 28 TABLET | Refills: 0 | Status: SHIPPED | OUTPATIENT
Start: 2021-06-23 | End: 2021-09-22 | Stop reason: SDUPTHER

## 2021-06-23 NOTE — TELEPHONE ENCOUNTER
----- Message from Abel Hoffman MD sent at 6/23/2021  7:20 AM CDT -----  Please let pt know labwork stable except potassium low at 3.3 recommend pt start taking K-DUR 20 MEQ PO BID for 2 weeks give 28 pills and no refills. Also recommend high potassium foods    Iron profile shows low iron levels and continue with iron supplementation.  His CBC shows stable hemoglobin and WBC    On lipid panel LDL elevated at 103 and recommend heart healthy diet    Recheck on next visit thanks

## 2021-09-21 NOTE — PROGRESS NOTES
Chief Complaint  Follow-up (3 MO) and Hypertension    Subjective          German Ray presents to [facility] [place]       Pt is 71 yo male with management of being overweight, ED, HTN, major depression, HLP, history of prostate cancer with radiation therapy, iron deficiency anemia, history of rectal bleeding/ulcer, history of radiation proctitis, diverticulosis, vitamin D deficiency, prediabetes, sp appendectomy, sp cataract surgery, gastritis, CKD stage 2      6/21/21 in office visit for recheck on pt's above medical issues. Pt has yet to get labwork ordered on 1/20/21. Pt continues to take his medications for his HTN, hypokalemia, HLP iron deficiency anemia and ED.  I have recent report from Urology in [place]  On 6/16/21 in regards to his history of prostate cancer and iron deficiency anemia. Pt still has intermittent rectal bleeding, BP is stable today.  Pt is doing fine. No chest pain no dizziness.  He is undecided to move to [place]. He lost 3 lbs since last visit .  He is stressed with son living with him who is an adult.     9/22/21 in office visit for recheck on pt's above medical issues. Pt continues to take his medications for HTN, GERD, ED, HLP, hypokalemia, BPH. Pt saw Hematology/Oncology on 9/8/21 for iron deficiency anemia and history of prostate cancer. Pt had labwork done on 6/21/21 that showed iron profile showed iron at 50 and iron saturaiton at 17. Vitamin D was at  60.9 lipid panel showed LDL at 103 HDL at 65. hga1c is normal CBC showed normal hemoglobin and WBC. PSA was normal at 0.719. CMP showed potassium at 3.3 with normal liver enzymes. CMP showed GFR at 72. He is doing fair. BP is high today. He did not take BP medication this morning. Denies any chest pain dizziness or headaches. He does have some abdominal discomfort mainly when he drinks juice. He did have EGD and colonoscopy                Chronic Kidney Disease  This is a  chronic problem. The current episode started more than 1 year ago. The problem occurs constantly. The problem has been unchanged. Associated symptoms include arthralgias and fatigue. Pertinent negatives include no abdominal pain, anorexia, change in bowel habit, chest pain, chills, congestion, coughing, diaphoresis, fever, headaches, joint swelling, myalgias, nausea, neck pain, numbness, rash, sore throat, swollen glands, urinary symptoms, vertigo, visual change, vomiting or weakness. Nothing aggravates the symptoms. He has tried nothing for the symptoms. The treatment provided no relief.   Heartburn  He complains of heartburn and water brash. He reports no abdominal pain, no belching, no chest pain, no choking, no coughing, no dysphagia, no early satiety, no globus sensation, no hoarse voice, no nausea, no sore throat, no stridor, no tooth decay or no wheezing. This is a recurrent problem. The current episode started today. The problem occurs constantly. The problem has been unchanged. The heartburn is of moderate intensity. The heartburn does not wake him from sleep. The heartburn does not limit his activity. Nothing aggravates the symptoms. Associated symptoms include fatigue. He has tried nothing for the symptoms. The treatment provided no relief. Past procedures do not include an abdominal ultrasound, an EGD, esophageal manometry, esophageal pH monitoring, H. pylori antibody titer or a UGI. Past invasive treatments do not include gastroplasty, gastroplication or reflux surgery.   Hyperlipidemia  This is a chronic problem. The current episode started more than 1 year ago. The problem is controlled. Recent lipid tests were reviewed and are variable. He has no history of chronic renal disease, diabetes, hypothyroidism, liver disease, obesity or nephrotic syndrome. The current treatment provides moderate improvement of lipids. There are no compliance problems.  Risk factors for coronary artery disease include  hypertension, male sex, a sedentary lifestyle and dyslipidemia.   Male  Problem  The patient's pertinent negatives include no genital injury, genital itching, genital lesions, pelvic pain, penile discharge, penile pain, priapism, scrotal swelling or testicular pain. Primary symptoms comment: history of prostate cancer . This is a chronic problem. The current episode started more than 1 month ago. The problem occurs constantly. The problem has been unchanged. Pertinent negatives include no abdominal pain, anorexia, chest pain, chills, constipation, coughing, diarrhea, discolored urine, dysuria, fever, flank pain, frequency, headaches, hematuria, hesitancy, joint pain, joint swelling, nausea, painful intercourse, rash, shortness of breath, sore throat, urgency, urinary retention or vomiting.   Hypertension   This is a chronic problem. The current episode started more than 1 year ago. The problem is unchanged. The problem is controlled. Pertinent negatives include no anxiety, blurred vision, chest pain, headaches, malaise/fatigue, neck pain, orthopnea, palpitations, peripheral edema, PND, shortness of breath or sweats. Risk factors for coronary artery disease include male gender, obesity, dyslipidemia and sedentary lifestyle. Past treatments include angiotensin blockers, calcium channel blockers and diuretics. Current antihypertension treatment includes angiotensin blockers, calcium channel blockers and diuretics. The current treatment provides moderate improvement. There is no history of angina, kidney disease, CAD/MI, CVA, heart failure, left ventricular hypertrophy, PVD or retinopathy. There is no history of chronic renal disease, coarctation of the aorta, hyperaldosteronism, hypercortisolism, hyperparathyroidism, a hypertension causing med, pheochromocytoma, renovascular disease, sleep apnea or a thyroid problem.   Anemia   Presents for follow-up visit. There has been no abdominal  "pain, anorexia, bruising/bleeding easily, confusion, fever, leg swelling, light-headedness, malaise/fatigue, pallor, palpitations, paresthesias, pica or weight loss. Signs of blood loss that are not present include hematemesis, hematochezia, melena, menorrhagia and vaginal bleeding. There is no history of chronic renal disease or heart failure. There are no compliance problems.  Compliance with medications is 0-25%.     Objective   Vital Signs:   /72   Pulse 63   Ht 185.4 cm (73\")   Wt 104 kg (229 lb)   SpO2 98%   BMI 30.21 kg/m²     Physical Exam   Result Review :                 Assessment and Plan    Diagnoses and all orders for this visit:    1. Essential hypertension (Primary)  -     CBC Auto Differential; Future  -     Comprehensive Metabolic Panel; Future  -     Lipid Panel; Future  -     Vitamin D 25 Hydroxy; Future  -     Vitamin B12; Future  -     Iron Profile; Future  -     Ferritin; Future    2. Mixed hyperlipidemia  -     CBC Auto Differential; Future  -     Comprehensive Metabolic Panel; Future  -     Lipid Panel; Future  -     Vitamin D 25 Hydroxy; Future  -     Vitamin B12; Future  -     Iron Profile; Future  -     Ferritin; Future    3. Vitamin D deficiency   -     CBC Auto Differential; Future  -     Comprehensive Metabolic Panel; Future  -     Lipid Panel; Future  -     Vitamin D 25 Hydroxy; Future  -     Vitamin B12; Future  -     Iron Profile; Future  -     Ferritin; Future    4. Erectile dysfunction, unspecified erectile dysfunction type  -     CBC Auto Differential; Future  -     Comprehensive Metabolic Panel; Future  -     Lipid Panel; Future  -     Vitamin D 25 Hydroxy; Future  -     Vitamin B12; Future  -     Iron Profile; Future  -     Ferritin; Future    5. Iron deficiency anemia, unspecified iron deficiency anemia type  -     CBC Auto Differential; Future  -     Comprehensive Metabolic Panel; Future  -     Lipid Panel; Future  -     Vitamin D 25 Hydroxy; Future  -     Vitamin " B12; Future  -     Iron Profile; Future  -     Ferritin; Future    6. Hypokalemia  -     CBC Auto Differential; Future  -     Comprehensive Metabolic Panel; Future  -     Lipid Panel; Future  -     Vitamin D 25 Hydroxy; Future  -     Vitamin B12; Future  -     Iron Profile; Future  -     Ferritin; Future    7. Class 1 obesity with serious comorbidity and body mass index (BMI) of 30.0 to 30.9 in adult, unspecified obesity type  -     CBC Auto Differential; Future  -     Comprehensive Metabolic Panel; Future  -     Lipid Panel; Future  -     Vitamin D 25 Hydroxy; Future  -     Vitamin B12; Future  -     Iron Profile; Future  -     Ferritin; Future    8. Gastroesophageal reflux disease, unspecified whether esophagitis present  -     CBC Auto Differential; Future  -     Comprehensive Metabolic Panel; Future  -     Lipid Panel; Future  -     Vitamin D 25 Hydroxy; Future  -     Vitamin B12; Future  -     Iron Profile; Future  -     Ferritin; Future    Other orders  -     famotidine (Pepcid) 20 MG tablet; Take 1 tablet by mouth 2 (Two) Times a Day.  Dispense: 60 tablet; Refill: 3            -recommend labwork  -recommend shingles vaccination   -ED - on viagra 100 mg PO PRN.   -GERD will get last EGD and colonoscopy from Dr. Ryan. Start on pepcid 20 mg PO BID. Information provided   -CKD stage 2 - continue to monitor  -hypokalemia - on potassium supplement     -history of prostate cancer -Urology in [facility] -last PSA was normal.  -fatigue/sleep disorder -  Pt referred to sleep study with Dr. Bella but did not complete sleep study. Pt was given number to reschedule sleep study  -Vitamin D deficiency - continue vitamin D pill. Recheck vitamin D levels    hypertension -  elevated today but pt did not take medications  on chlorthalidone 25  mg dialy. on micardis 40 mg PO q daily. And dilitzem 360 mg PO q daily.  on cardura 8 mg PO qhs.     -obesity  - counseled weight loss >5 minutes BMI at 30.21     -hyperlipidemia -Continue pravastatin.  recommend heart healthy diet   -rectal ulcer/diverticulosis/ rectal bleeding/history of radiation proctitis - GI following was on  mesalamine suppository 1000 mg rectal at bedtime.  Recommend high fiber diet.  he will let me know if symptoms have worsening   -depression - off celexa 20 mg PO q daily.   -iron deficiency anemia - Hematologist following.on ferrous sulfate 325 mg PO TID   -mole on back.  Has stuck on appearance. Consider cryotherapy or shave biopsy in the future. Refferral to Dr. Neves for further evaluation was made but mole came off before appt.   -advised pt to be safe and call with questions and concerns  -advised pt to go to ER or call 911 if symptoms worrisome or severe  -advised to followup with specialist and referrals   I spent 30  minutes caring for German on this date of service. This time includes time spent by me in the following activities: preparing for the visit, reviewing tests, obtaining and/or reviewing a separately obtained history, performing a medically appropriate examination and/or evaluation, counseling and educating the patient/family/caregiver, ordering medications, tests, or procedures, referring and communicating with other health care professionals, documenting information in the medical record, independently interpreting results and communicating that information with the patient/family/caregiver and care coordination.         This document has been electronically signed by Abel Hoffman MD on September 22, 2021 09:47 CDT        Follow Up   Return in about 6 weeks (around 11/3/2021).  Patient was given instructions and counseling regarding his condition or for health maintenance advice. Please see specific information pulled into the AVS if appropriate.

## 2021-09-22 ENCOUNTER — OFFICE VISIT (OUTPATIENT)
Dept: FAMILY MEDICINE CLINIC | Facility: CLINIC | Age: 71
End: 2021-09-22

## 2021-09-22 ENCOUNTER — TELEPHONE (OUTPATIENT)
Dept: FAMILY MEDICINE CLINIC | Facility: CLINIC | Age: 71
End: 2021-09-22

## 2021-09-22 VITALS
DIASTOLIC BLOOD PRESSURE: 72 MMHG | HEIGHT: 73 IN | WEIGHT: 229 LBS | SYSTOLIC BLOOD PRESSURE: 146 MMHG | BODY MASS INDEX: 30.35 KG/M2 | HEART RATE: 63 BPM | OXYGEN SATURATION: 98 %

## 2021-09-22 DIAGNOSIS — K21.9 GASTROESOPHAGEAL REFLUX DISEASE, UNSPECIFIED WHETHER ESOPHAGITIS PRESENT: ICD-10-CM

## 2021-09-22 DIAGNOSIS — D50.9 IRON DEFICIENCY ANEMIA, UNSPECIFIED IRON DEFICIENCY ANEMIA TYPE: ICD-10-CM

## 2021-09-22 DIAGNOSIS — E66.9 CLASS 1 OBESITY WITH SERIOUS COMORBIDITY AND BODY MASS INDEX (BMI) OF 30.0 TO 30.9 IN ADULT, UNSPECIFIED OBESITY TYPE: ICD-10-CM

## 2021-09-22 DIAGNOSIS — E55.9 VITAMIN D DEFICIENCY: ICD-10-CM

## 2021-09-22 DIAGNOSIS — E78.2 MIXED HYPERLIPIDEMIA: ICD-10-CM

## 2021-09-22 DIAGNOSIS — N52.9 ERECTILE DYSFUNCTION, UNSPECIFIED ERECTILE DYSFUNCTION TYPE: ICD-10-CM

## 2021-09-22 DIAGNOSIS — E87.6 HYPOKALEMIA: ICD-10-CM

## 2021-09-22 DIAGNOSIS — I10 ESSENTIAL HYPERTENSION: Primary | ICD-10-CM

## 2021-09-22 PROCEDURE — 99214 OFFICE O/P EST MOD 30 MIN: CPT | Performed by: FAMILY MEDICINE

## 2021-09-22 RX ORDER — DOXAZOSIN 8 MG/1
8 TABLET ORAL NIGHTLY
Qty: 90 TABLET | Refills: 3 | Status: SHIPPED | OUTPATIENT
Start: 2021-09-22 | End: 2022-08-10 | Stop reason: SDUPTHER

## 2021-09-22 RX ORDER — PNV NO.95/FERROUS FUM/FOLIC AC 28MG-0.8MG
325 TABLET ORAL
Qty: 270 TABLET | Refills: 1 | Status: SHIPPED | OUTPATIENT
Start: 2021-09-22 | End: 2022-03-17 | Stop reason: SDUPTHER

## 2021-09-22 RX ORDER — SILDENAFIL CITRATE 100 MG
100 TABLET ORAL DAILY PRN
Qty: 10 TABLET | Refills: 3 | Status: SHIPPED | OUTPATIENT
Start: 2021-09-22 | End: 2022-06-10 | Stop reason: SDUPTHER

## 2021-09-22 RX ORDER — FAMOTIDINE 20 MG/1
20 TABLET, FILM COATED ORAL 2 TIMES DAILY
Qty: 60 TABLET | Refills: 3 | Status: SHIPPED | OUTPATIENT
Start: 2021-09-22 | End: 2021-11-03 | Stop reason: SDUPTHER

## 2021-09-22 RX ORDER — TELMISARTAN 40 MG/1
40 TABLET ORAL DAILY
Qty: 90 TABLET | Refills: 1 | Status: SHIPPED | OUTPATIENT
Start: 2021-09-22 | End: 2022-03-17 | Stop reason: SDUPTHER

## 2021-09-22 RX ORDER — CHLORTHALIDONE 25 MG/1
25 TABLET ORAL DAILY
Qty: 90 TABLET | Refills: 1 | Status: SHIPPED | OUTPATIENT
Start: 2021-09-22 | End: 2022-03-17 | Stop reason: SDUPTHER

## 2021-09-22 RX ORDER — OMEPRAZOLE 20 MG/1
TABLET, DELAYED RELEASE ORAL
COMMUNITY
End: 2021-09-22

## 2021-09-22 RX ORDER — DILTIAZEM HYDROCHLORIDE 360 MG/1
360 CAPSULE, EXTENDED RELEASE ORAL DAILY
Qty: 90 CAPSULE | Refills: 3 | Status: SHIPPED | OUTPATIENT
Start: 2021-09-22 | End: 2022-06-10 | Stop reason: SDUPTHER

## 2021-09-22 RX ORDER — FAMOTIDINE 20 MG/1
20 TABLET, FILM COATED ORAL 2 TIMES DAILY
Qty: 60 TABLET | Refills: 3 | Status: SHIPPED | OUTPATIENT
Start: 2021-09-22 | End: 2021-09-22 | Stop reason: SDUPTHER

## 2021-09-22 RX ORDER — ERGOCALCIFEROL 1.25 MG/1
50000 CAPSULE ORAL
Qty: 12 CAPSULE | Refills: 3 | Status: SHIPPED | OUTPATIENT
Start: 2021-09-22 | End: 2022-06-10 | Stop reason: SDUPTHER

## 2021-09-22 RX ORDER — POTASSIUM CHLORIDE 20 MEQ/1
20 TABLET, EXTENDED RELEASE ORAL 2 TIMES DAILY
Qty: 28 TABLET | Refills: 0 | Status: SHIPPED | OUTPATIENT
Start: 2021-09-22 | End: 2021-10-13 | Stop reason: DRUGHIGH

## 2021-09-22 RX ORDER — PRAVASTATIN SODIUM 80 MG/1
80 TABLET ORAL NIGHTLY
Qty: 90 TABLET | Refills: 3 | Status: SHIPPED | OUTPATIENT
Start: 2021-09-22 | End: 2022-06-10 | Stop reason: SDUPTHER

## 2021-09-22 NOTE — TELEPHONE ENCOUNTER
Called in stating that when he was here today he requested some medications to be refilled on his medication list and they were not called in.

## 2021-09-22 NOTE — PATIENT INSTRUCTIONS
New medications pepcid 20 mg twice a day    Recheck in 6 weeks      Bring blood pressure readings next visit     Get labwork before next visit     Food Choices for Gastroesophageal Reflux Disease, Adult  When you have gastroesophageal reflux disease (GERD), the foods you eat and your eating habits are very important. Choosing the right foods can help ease the discomfort of GERD. Consider working with a dietitian to help you make healthy food choices.  What are tips for following this plan?  Reading food labels  · Read the label for foods that are low in saturated fat. Foods that have less than 5% of daily value (DV) of fat and 0 g of trans fats may help with your symptoms.  Cooking  · Cook foods using methods other than frying. This may include baking, steaming, grilling, or broiling. These are all methods that do not need a lot of fat for cooking.  · To add flavor, try to use herbs that are low in spice and acidity.  Meal planning    · Choose healthy foods that are low in fat, such as fruits, vegetables, whole grains, low-fat dairy products, lean meats, fish, and poultry.  · Eat frequent, small meals instead of three large meals each day. Eat your meals slowly, in a relaxed setting. Avoid bending over or lying down until 2-3 hours after eating.  · Limit high-fat foods such as fatty meats or fried foods.  · Limit your intake of oils, butter, and shortening to less than 8 teaspoons each day.  · Avoid the following:  ? Foods that cause symptoms. These may be different for different people. Keep a food diary to keep track of foods that cause symptoms.  ? Alcohol.  ? Drinking large amounts of liquid with meals.  ? Eating meals during the 2-3 hours before bed.    Lifestyle  · Maintain a healthy weight. Ask your health care provider what weight is healthy for you. If you need to lose weight, work with your health care provider to do so safely.  · Exercise for at least 30 minutes on 5 or more days each week, or as told  by your health care provider.  · Avoid wearing clothes that fit tightly around your waist and chest.  · Do not use any products that contain nicotine or tobacco, such as cigarettes, e-cigarettes, and chewing tobacco. If you need help quitting, ask your health care provider.  · Sleep with the head of your bed raised. Use a wedge under the mattress or blocks under the bed frame to raise the head of the bed.  What foods should I eat?    Eat a healthy, well-balanced diet of fruits, vegetables, whole grains, low-fat dairy products, lean meats, fish, and poultry. Each person is different. Foods that may trigger symptoms in one person may not trigger any symptoms in another person. Work with your health care provider to identify foods that are safe for you.  The items listed above may not be a complete list of foods and beverages you can eat. Contact a dietitian for more information.  What foods should I avoid?  Limiting some of these foods may help in managing the symptoms of GERD. Everyone is different. Consult a dietitian or your health care provider to help you identify the exact foods to avoid, if any.  Fruits  Any fruits prepared with added fat. Any fruits that cause symptoms. For some people this may include citrus fruits, such as oranges, grapefruit, pineapple, and chandler.  Vegetables  Deep-fried vegetables. French fries. Any vegetables prepared with added fat. Any vegetables that cause symptoms. For some people, this may include tomatoes and tomato products, chili peppers, onions and garlic, and horseradish.  Grains  Pastries or quick breads with added fat.  Meats and other proteins  High-fat meats, such as fatty beef or pork, hot dogs, ribs, ham, sausage, salami, and kenney. Fried meat or protein, including fried fish and fried chicken. Nuts and nut butters.  Dairy  Whole milk and chocolate milk. Sour cream. Cream. Ice cream. Cream cheese. Milkshakes.  Fats and oils  Butter. Margarine. Shortening.  Ghee.  Beverages  Coffee and tea, with or without caffeine. Carbonated beverages. Sodas. Energy drinks. Fruit juice made with acidic fruits (such as orange or grapefruit). Tomato juice. Alcoholic drinks.  Sweets and desserts  Chocolate and cocoa. Donuts.  Seasonings and condiments  Pepper. Peppermint and spearmint. Added salt. Any condiments, herbs, or seasonings that cause symptoms. For some people, this may include stack, hot sauce, or vinegar-based salad dressings.  The items listed above may not be a complete list of foods and beverages you should avoid. Contact a dietitian for more information.  Questions to ask your health care provider  Diet and lifestyle changes are usually the first steps that are taken to manage symptoms of GERD. If diet and lifestyle changes do not improve your symptoms, talk with your health care provider about taking medicines.  Where to find more information  · [employer]intestinal [employer]: aboutgerd.org  Summary  · When you have gastroesophageal reflux disease (GERD), food and lifestyle choices may be very helpful in easing the discomfort of GERD.  · Eat frequent, small meals instead of three large meals each day. Eat your meals slowly, in a relaxed setting. Avoid bending over or lying down until 2-3 hours after eating.  · Limit high-fat foods such as fatty meat or fried foods.  This information is not intended to replace advice given to you by your health care provider. Make sure you discuss any questions you have with your health care provider.  Document Revised: 10/12/2020 Document Reviewed: 10/12/2020  [employer] Patient Education © 2021 [employer]    [place] Diet  A bland diet consists of foods that are often soft and do not have a lot of fat, fiber, or extra seasonings. Foods without fat, fiber, or seasoning are easier for the body to digest. They are also less likely to irritate your mouth, throat, stomach, and other parts of your digestive system. A  bland diet is sometimes called a BRAT diet.  What is my plan?  Your health care provider or food and nutrition specialist (dietitian) may recommend specific changes to your diet to prevent symptoms or to treat your symptoms. These changes may include:  · Eating small meals often.  · Cooking food until it is soft enough to chew easily.  · Chewing your food well.  · Drinking fluids slowly.  · Not eating foods that are very spicy, sour, or fatty.  · Not eating citrus fruits, such as oranges and grapefruit.  What do I need to know about this diet?  · Eat a variety of foods from the bland diet food list.  · Do not follow a bland diet longer than needed.  · Ask your health care provider whether you should take vitamins or supplements.  What foods can I eat?  Grains    Hot cereals, such as cream of wheat. Rice. Bread, crackers, or tortillas made from refined white flour.  Vegetables  Canned or cooked vegetables. Mashed or boiled potatoes.  Fruits    Bananas. Applesauce. Other types of cooked or canned fruit with the skin and seeds removed, such as canned peaches or pears.  Meats and other proteins    Scrambled eggs. Creamy peanut butter or other nut butters. Lean, well-cooked meats, such as chicken or fish. Tofu. Soups or broths.  Dairy  Low-fat dairy products, such as milk, cottage cheese, or yogurt.  Beverages    Water. Herbal tea. Apple juice.  Fats and oils  Mild salad dressings. Canola or olive oil.  Sweets and desserts  Pudding. Custard. Fruit gelatin. Ice cream.  The items listed above may not be a complete list of recommended foods and beverages. Contact a dietitian for more options.  What foods are not recommended?  Grains  Whole grain breads and cereals.  Vegetables  Raw vegetables.  Fruits  Raw fruits, especially citrus, berries, or dried fruits.  Dairy  Whole fat dairy foods.  Beverages  Caffeinated drinks. Alcohol.  Seasonings and condiments  Strongly flavored seasonings or condiments. Hot sauce. Salsa.  Other  "foods  Spicy foods. Fried foods. Sour foods, such as pickled or fermented foods. Foods with high sugar content. Foods high in fiber.  The items listed above may not be a complete list of foods and beverages to avoid. Contact a dietitian for more information.  Summary  · A bland diet consists of foods that are often soft and do not have a lot of fat, fiber, or extra seasonings.  · Foods without fat, fiber, or seasoning are easier for the body to digest.  · Check with your health care provider to see how long you should follow this diet plan. It is not meant to be followed for long periods.  This information is not intended to replace advice given to you by your health care provider. Make sure you discuss any questions you have with your health care provider.  Document Revised: 01/16/2019 Document Reviewed: 01/16/2019  [employer] Patient Education © 2021 [employer]    [employer] Current Therapy 2021 (pp. 213-216). [place]: [employer].\">   Gastroesophageal Reflux Disease, Adult  Gastroesophageal reflux (LASHAWN) happens when acid from the stomach flows up into the tube that connects the mouth and the stomach (esophagus). Normally, food travels down the esophagus and stays in the stomach to be digested. However, when a person has LASHAWN, food and stomach acid sometimes move back up into the esophagus. If this becomes a more serious problem, the person may be diagnosed with a disease called gastroesophageal reflux disease (GERD). GERD occurs when the reflux:  · Happens often.  · Causes frequent or severe symptoms.  · Causes problems such as damage to the esophagus.  When stomach acid comes in contact with the esophagus, the acid may cause soreness (inflammation) in the esophagus. Over time, GERD may create small holes (ulcers) in the lining of the esophagus.  What are the causes?  This condition is caused by a problem with the muscle between the esophagus and the stomach (lower esophageal sphincter, or LES). Normally, the " LES muscle closes after food passes through the esophagus to the stomach. When the LES is weakened or abnormal, it does not close properly, and that allows food and stomach acid to go back up into the esophagus.  The LES can be weakened by certain dietary substances, medicines, and medical conditions, including:  · Tobacco use.  · Pregnancy.  · Having a hiatal hernia.  · Alcohol use.  · Certain foods and beverages, such as coffee, chocolate, onions, and peppermint.  What increases the risk?  You are more likely to develop this condition if you:  · Have an increased body weight.  · Have a connective tissue disorder.  · Use NSAID medicines.  What are the signs or symptoms?  Symptoms of this condition include:  · Heartburn.  · Difficult or painful swallowing.  · The feeling of having a lump in the throat.  · A bitter taste in the mouth.  · Bad breath.  · Having a large amount of saliva.  · Having an upset or bloated stomach.  · Belching.  · Chest pain. Different conditions can cause chest pain. Make sure you see your health care provider if you experience chest pain.  · Shortness of breath or wheezing.  · Ongoing (chronic) cough or a night-time cough.  · Wearing away of tooth enamel.  · Weight loss.  How is this diagnosed?  Your health care provider will take a medical history and perform a physical exam. To determine if you have mild or severe GERD, your health care provider may also monitor how you respond to treatment. You may also have tests, including:  · A test to examine your stomach and esophagus with a small camera (endoscopy).  · A test that measures the acidity level in your esophagus.  · A test that measures how much pressure is on your esophagus.  · A barium swallow or modified barium swallow test to show the shape, size, and functioning of your esophagus.  How is this treated?  The goal of treatment is to help relieve your symptoms and to prevent complications. Treatment for this condition may vary  depending on how severe your symptoms are. Your health care provider may recommend:  · Changes to your diet.  · Medicine.  · Surgery.  Follow these instructions at home:  Eating and drinking    · Follow a diet as recommended by your health care provider. This may involve avoiding foods and drinks such as:  ? Coffee and tea (with or without caffeine).  ? Drinks that contain alcohol.  ? Energy drinks and sports drinks.  ? Carbonated drinks or sodas.  ? Chocolate and cocoa.  ? Peppermint and mint flavorings.  ? Garlic and onions.  ? Horseradish.  ? Spicy and acidic foods, including peppers, chili powder, stack powder, vinegar, hot sauces, and barbecue sauce.  ? Citrus fruit juices and citrus fruits, such as oranges, chandler, and limes.  ? Tomato-based foods, such as red sauce, chili, salsa, and pizza with red sauce.  ? Fried and fatty foods, such as donuts, french fries, potato chips, and high-fat dressings.  ? High-fat meats, such as hot dogs and fatty cuts of red and white meats, such as rib eye steak, sausage, ham, and kenney.  ? High-fat dairy items, such as whole milk, butter, and cream cheese.  · Eat small, frequent meals instead of large meals.  · Avoid drinking large amounts of liquid with your meals.  · Avoid eating meals during the 2-3 hours before bedtime.  · Avoid lying down right after you eat.  · Do not exercise right after you eat.    Lifestyle    · Do not use any products that contain nicotine or tobacco, such as cigarettes, e-cigarettes, and chewing tobacco. If you need help quitting, ask your health care provider.  · Try to reduce your stress by using methods such as yoga or meditation. If you need help reducing stress, ask your health care provider.  · If you are overweight, reduce your weight to an amount that is healthy for you. Ask your health care provider for guidance about a safe weight loss goal.    General instructions  · Pay attention to any changes in your symptoms.  · Take over-the-counter  and prescription medicines only as told by your health care provider. Do not take aspirin, ibuprofen, or other NSAIDs unless your health care provider told you to do so.  · Wear loose-fitting clothing. Do not wear anything tight around your waist that causes pressure on your abdomen.  · Raise (elevate) the head of your bed about 6 inches (15 cm).  · Avoid bending over if this makes your symptoms worse.  · Keep all follow-up visits as told by your health care provider. This is important.  Contact a health care provider if:  · You have:  ? New symptoms.  ? Unexplained weight loss.  ? Difficulty swallowing or it hurts to swallow.  ? Wheezing or a persistent cough.  ? A hoarse voice.  · Your symptoms do not improve with treatment.  Get help right away if you:  · Have pain in your arms, neck, jaw, teeth, or back.  · Feel sweaty, dizzy, or light-headed.  · Have chest pain or shortness of breath.  · Vomit and your vomit looks like blood or coffee grounds.  · Faint.  · Have stool that is bloody or black.  · Cannot swallow, drink, or eat.  Summary  · Gastroesophageal reflux happens when acid from the stomach flows up into the esophagus. GERD is a disease in which the reflux happens often, causes frequent or severe symptoms, or causes problems such as damage to the esophagus.  · Treatment for this condition may vary depending on how severe your symptoms are. Your health care provider may recommend diet and lifestyle changes, medicine, or surgery.  · Contact a health care provider if you have new or worsening symptoms.  · Take over-the-counter and prescription medicines only as told by your health care provider. Do not take aspirin, ibuprofen, or other NSAIDs unless your health care provider told you to do so.  · Keep all follow-up visits as told by your health care provider. This is important.  This information is not intended to replace advice given to you by your health care provider. Make sure you discuss any questions you  have with your health care provider.  Document Revised: 06/26/2019 Document Reviewed: 06/26/2019  [employer] Patient Education © 2021 [employer]  Famotidine tablets or gelcaps  What is this medicine?  FAMOTIDINE (marilyn jeromeen) is a type of antihistamine that blocks the release of stomach acid. It is used to treat stomach or intestinal ulcers. It can also relieve heartburn from acid reflux.  This medicine may be used for other purposes; ask your health care provider or pharmacist if you have questions.  COMMON BRAND NAME(S): Heartburn Relief, Pepcid, Pepcid AC, Pepcid AC Maximum Strength, Zantac  What should I tell my health care provider before I take this medicine?  They need to know if you have any of these conditions:  · kidney or liver disease  · trouble swallowing  · an unusual or allergic reaction to famotidine, other medicines, foods, dyes, or preservatives  · pregnant or trying to get pregnant  · breast-feeding  How should I use this medicine?  Take this medicine by mouth with a glass of water. Follow the directions on the prescription label. If you only take this medicine once a day, take it at bedtime. Take your doses at regular intervals. Do not take your medicine more often than directed.  Talk to your pediatrician regarding the use of this medicine in children. Special care may be needed.  Overdosage: If you think you have taken too much of this medicine contact a poison control center or emergency room at once.  NOTE: This medicine is only for you. Do not share this medicine with others.  What if I miss a dose?  If you miss a dose, take it as soon as you can. If it is almost time for your next dose, take only that dose. Do not take double or extra doses.  What may interact with this medicine?  · delavirdine  · itraconazole  · ketoconazole  This list may not describe all possible interactions. Give your health care provider a list of all the medicines, herbs, non-prescription drugs, or dietary  supplements you use. Also tell them if you smoke, drink alcohol, or use illegal drugs. Some items may interact with your medicine.  What should I watch for while using this medicine?  Tell your doctor or health care professional if your condition does not start to get better or if it gets worse. Finish the full course of tablets prescribed, even if you feel better.  Do not take with aspirin, ibuprofen or other antiinflammatory medicines. These can make your condition worse.  Do not smoke cigarettes or drink alcohol. These cause irritation in your stomach and can increase the time it will take for ulcers to heal.  If you get black, tarry stools or vomit up what looks like coffee grounds, call your doctor or health care professional at once. You may have a bleeding ulcer.  This medicine may cause a decrease in vitamin B12. You should make sure that you get enough vitamin B12 while you are taking this medicine. Discuss the foods you eat and the vitamins you take with your health care professional.  What side effects may I notice from receiving this medicine?  Side effects that you should report to your doctor or health care professional as soon as possible:  · agitation, nervousness  · confusion  · hallucinations  · skin rash, itching  Side effects that usually do not require medical attention (report to your doctor or health care professional if they continue or are bothersome):  · constipation  · diarrhea  · dizziness  · headache  This list may not describe all possible side effects. Call your doctor for medical advice about side effects. You may report side effects to FDA at 3-783-FDA-9216.  Where should I keep my medicine?  Keep out of the reach of children.  Store at room temperature between 15 and 30 degrees C (59 and 86 degrees F). Do not freeze. Throw away any unused medicine after the expiration date.  NOTE: This sheet is a summary. It may not cover all possible information. If you have questions about this  medicine, talk to your doctor, pharmacist, or health care provider.  © 2021 [employer] (2018-08-03 13:17:50)    Chronic Kidney Disease, Adult  Chronic kidney disease (CKD) occurs when the kidneys are slowly and permanently damaged over a long period of time. The kidneys are a pair of organs that do many important jobs in the body, including:  · Removing waste and extra fluid from the blood to make urine.  · Making hormones that maintain the amount of fluid in tissues and blood vessels.  · Maintaining the right amount of fluids and chemicals in the body.  A small amount of kidney damage may not cause problems, but a large amount of damage may make it hard or impossible for the kidneys to work right. Steps must be taken to slow kidney damage or to stop it from getting worse. If steps are not taken, the kidneys may stop working permanently (end-stage renal disease, or ESRD). Most of the time, CKD does not go away, but it can often be controlled. People who have CKD are usually able to live full lives.  What are the causes?  The most common causes of this condition are diabetes and high blood pressure (hypertension).  Other causes include:  · Cardiovascular diseases. These affect the heart and blood vessels.  · Kidney diseases. These include:  ? Glomerulonephritis, or inflammation of the tiny filters in the kidneys.  ? Interstitial nephritis. This is swelling of the small tubes of the kidneys and of the surrounding structures.  ? Polycystic kidney disease, in which clusters of fluid-filled sacs form within the kidneys.  ? Renal vascular disease. This includes disorders that affect the arteries and veins of the kidneys.  · Diseases that affect the body's defense system (immune system).  · A problem with urine flow. This may be caused by:  ? Kidney stones.  ? Cancer.  ? An enlarged prostate, in males.  · A kidney infection or urinary tract infection (UTI) that keeps coming back.  · Vasculitis. This is swelling  or inflammation of the blood vessels.  What increases the risk?  Your chances of having kidney disease increase with age.  The following factors may make you more likely to develop this condition:  · A family history of kidney disease or kidney failure. Kidney failure means the kidneys can no longer work right.  · Certain genetic diseases.  · Taking medicines often that are damaging to the kidneys.  · Being around or being in contact with toxic substances.  · Obesity.  · A history of tobacco use.  What are the signs or symptoms?  Symptoms of this condition include:  · Feeling very tired (lethargic) and having less energy.  · Swelling, or edema, of the face, legs, ankles, or feet.  · Nausea or vomiting, or loss of appetite.  · Confusion or trouble concentrating.  · Muscle twitches and cramps, especially in the legs.  · Dry, itchy skin.  · A metallic taste in the mouth.  · Producing less urine, or producing more urine (especially at night).  · Shortness of breath.  · Trouble sleeping.  CKD may also result in not having enough red blood cells or hemoglobin in the blood (anemia) or having weak bones (bone disease).  Symptoms develop slowly and may not be obvious until the kidney damage becomes severe. It is possible to have kidney disease for years without having symptoms.  How is this diagnosed?  This condition may be diagnosed based on:  · Blood tests.  · Urine tests.  · Imaging tests, such as an ultrasound or a CT scan.  · A kidney biopsy. This involves removing a sample of kidney tissue to be looked at under a microscope.  Results from these tests will help to determine how serious the CKD is.  How is this treated?  There is no cure for most cases of this condition, but treatment usually relieves symptoms and prevents or slows the worsening of the disease. Treatment may include:  · Diet changes, which may require you to avoid alcohol and foods that are high in salt, potassium, phosphorous, and protein.  · Medicines.  These may:  ? Lower blood pressure.  ? Control blood sugar (glucose).  ? Relieve anemia.  ? Relieve swelling.  ? Protect your bones.  ? Improve the balance of salts and minerals in your blood (electrolytes).  · Dialysis, which is a type of treatment that removes toxic waste from the body. It may be needed if you have kidney failure.  · Managing any other conditions that are causing your CKD or making it worse.  Follow these instructions at home:  Medicines  · Take over-the-counter and prescription medicines only as told by your health care provider. The amount of some medicines that you take may need to be changed.  · Do not take any new medicines unless approved by your health care provider. Many medicines can make kidney damage worse.  · Do not take any vitamin and mineral supplements unless approved by your health care provider. Many nutritional supplements can make kidney damage worse.  Lifestyle    · Do not use any products that contain nicotine or tobacco, such as cigarettes, e-cigarettes, and chewing tobacco. If you need help quitting, ask your health care provider.  · If you drink alcohol:  ? Limit how much you use to:  § 0-1 drink a day for women who are not pregnant.  § 0-2 drinks a day for men.  ? Know how much alcohol is in your drink. In the [place] one drink equals one 12 oz bottle of beer (355 mL), one 5 oz glass of wine (148 mL), or one 1½ oz glass of hard liquor (44 mL).  · Maintain a healthy weight. If you need help, ask your health care provider.    General instructions    · Follow instructions from your health care provider about eating or drinking restrictions, including any prescribed diet.  · Track your blood pressure at home. Report changes in your blood pressure as told.  · If you are being treated for diabetes, track your blood glucose levels as told.  · Start or continue an exercise plan. Exercise at least 30 minutes a day, 5 days a week.  · Keep your immunizations up to date as  told.  · Keep all follow-up visits. This is important.    Where to find more information  · American Association of Kidney Patients: www.aakp.org  · National Kidney Foundation: www.kidney.org  · American Kidney Fund: www.akfinc.org  · Life Options: www.lifeoptions.org  · Kidney School: www.kidneyschool.org  Contact a health care provider if:  · Your symptoms get worse.  · You develop new symptoms.  Get help right away if:  · You develop symptoms of ESRD. These include:  ? Headaches.  ? Numbness in your hands or feet.  ? Easy bruising.  ? Frequent hiccups.  ? Chest pain.  ? Shortness of breath.  ? Lack of menstrual periods, in women.  · You have a fever.  · You are producing less urine than usual.  · You have pain or bleeding when you urinate or when you have a bowel movement.  These symptoms may represent a serious problem that is an emergency. Do not wait to see if the symptoms will go away. Get medical help right away. Call your local emergency services (911 in the [place] Do not drive yourself to the hospital.  Summary  · Chronic kidney disease (CKD) occurs when the kidneys become damaged slowly over a long period of time.  · The most common causes of this condition are diabetes and high blood pressure (hypertension).  · There is no cure for most cases of CKD, but treatment usually relieves symptoms and prevents or slows the worsening of the disease. Treatment may include a combination of lifestyle changes, medicines, and dialysis.  This information is not intended to replace advice given to you by your health care provider. Make sure you discuss any questions you have with your health care provider.  Document Revised: 03/24/2021 Document Reviewed: 03/24/2021  Elsevier Patient Education © 2021 [employer] Inc.

## 2021-09-23 ENCOUNTER — TELEPHONE (OUTPATIENT)
Dept: FAMILY MEDICINE CLINIC | Facility: CLINIC | Age: 71
End: 2021-09-23

## 2021-10-12 ENCOUNTER — LAB (OUTPATIENT)
Dept: LAB | Facility: HOSPITAL | Age: 71
End: 2021-10-12

## 2021-10-12 DIAGNOSIS — K21.9 GASTROESOPHAGEAL REFLUX DISEASE, UNSPECIFIED WHETHER ESOPHAGITIS PRESENT: ICD-10-CM

## 2021-10-12 DIAGNOSIS — D50.9 IRON DEFICIENCY ANEMIA, UNSPECIFIED IRON DEFICIENCY ANEMIA TYPE: ICD-10-CM

## 2021-10-12 DIAGNOSIS — E87.6 HYPOKALEMIA: ICD-10-CM

## 2021-10-12 DIAGNOSIS — E55.9 VITAMIN D DEFICIENCY: ICD-10-CM

## 2021-10-12 DIAGNOSIS — E66.9 CLASS 1 OBESITY WITH SERIOUS COMORBIDITY AND BODY MASS INDEX (BMI) OF 30.0 TO 30.9 IN ADULT, UNSPECIFIED OBESITY TYPE: ICD-10-CM

## 2021-10-12 DIAGNOSIS — N52.9 ERECTILE DYSFUNCTION, UNSPECIFIED ERECTILE DYSFUNCTION TYPE: ICD-10-CM

## 2021-10-12 DIAGNOSIS — I10 ESSENTIAL HYPERTENSION: ICD-10-CM

## 2021-10-12 DIAGNOSIS — E78.2 MIXED HYPERLIPIDEMIA: ICD-10-CM

## 2021-10-12 LAB
25(OH)D3 SERPL-MCNC: 64.7 NG/ML
ALBUMIN SERPL-MCNC: 4.2 G/DL (ref 3.5–5.2)
ALBUMIN/GLOB SERPL: 1.5 G/DL
ALP SERPL-CCNC: 79 U/L (ref 39–117)
ALT SERPL W P-5'-P-CCNC: 15 U/L (ref 1–41)
ANION GAP SERPL CALCULATED.3IONS-SCNC: 9.5 MMOL/L (ref 5–15)
AST SERPL-CCNC: 17 U/L (ref 1–40)
BASOPHILS # BLD AUTO: 0.03 10*3/MM3 (ref 0–0.2)
BASOPHILS NFR BLD AUTO: 0.9 % (ref 0–1.5)
BILIRUB SERPL-MCNC: 0.4 MG/DL (ref 0–1.2)
BUN SERPL-MCNC: 14 MG/DL (ref 8–23)
BUN/CREAT SERPL: 13.3 (ref 7–25)
CALCIUM SPEC-SCNC: 9.4 MG/DL (ref 8.6–10.5)
CHLORIDE SERPL-SCNC: 104 MMOL/L (ref 98–107)
CHOLEST SERPL-MCNC: 172 MG/DL (ref 0–200)
CO2 SERPL-SCNC: 28.5 MMOL/L (ref 22–29)
CREAT SERPL-MCNC: 1.05 MG/DL (ref 0.76–1.27)
DEPRECATED RDW RBC AUTO: 41.8 FL (ref 37–54)
EOSINOPHIL # BLD AUTO: 0.19 10*3/MM3 (ref 0–0.4)
EOSINOPHIL NFR BLD AUTO: 5.9 % (ref 0.3–6.2)
ERYTHROCYTE [DISTWIDTH] IN BLOOD BY AUTOMATED COUNT: 12.5 % (ref 12.3–15.4)
FERRITIN SERPL-MCNC: 264 NG/ML (ref 30–400)
GFR SERPL CREATININE-BSD FRML MDRD: 84 ML/MIN/1.73
GLOBULIN UR ELPH-MCNC: 2.8 GM/DL
GLUCOSE SERPL-MCNC: 96 MG/DL (ref 65–99)
HCT VFR BLD AUTO: 38.5 % (ref 37.5–51)
HDLC SERPL-MCNC: 46 MG/DL (ref 40–60)
HGB BLD-MCNC: 12.8 G/DL (ref 13–17.7)
IMM GRANULOCYTES # BLD AUTO: 0 10*3/MM3 (ref 0–0.05)
IMM GRANULOCYTES NFR BLD AUTO: 0 % (ref 0–0.5)
IRON 24H UR-MRATE: 62 MCG/DL (ref 59–158)
IRON SATN MFR SERPL: 23 % (ref 20–50)
LDLC SERPL CALC-MCNC: 107 MG/DL (ref 0–100)
LDLC/HDLC SERPL: 2.28 {RATIO}
LYMPHOCYTES # BLD AUTO: 1.08 10*3/MM3 (ref 0.7–3.1)
LYMPHOCYTES NFR BLD AUTO: 33.3 % (ref 19.6–45.3)
MCH RBC QN AUTO: 30.6 PG (ref 26.6–33)
MCHC RBC AUTO-ENTMCNC: 33.2 G/DL (ref 31.5–35.7)
MCV RBC AUTO: 92.1 FL (ref 79–97)
MONOCYTES # BLD AUTO: 0.31 10*3/MM3 (ref 0.1–0.9)
MONOCYTES NFR BLD AUTO: 9.6 % (ref 5–12)
NEUTROPHILS NFR BLD AUTO: 1.63 10*3/MM3 (ref 1.7–7)
NEUTROPHILS NFR BLD AUTO: 50.3 % (ref 42.7–76)
NRBC BLD AUTO-RTO: 0 /100 WBC (ref 0–0.2)
PLATELET # BLD AUTO: 222 10*3/MM3 (ref 140–450)
PMV BLD AUTO: 10 FL (ref 6–12)
POTASSIUM SERPL-SCNC: 3.3 MMOL/L (ref 3.5–5.2)
PROT SERPL-MCNC: 7 G/DL (ref 6–8.5)
RBC # BLD AUTO: 4.18 10*6/MM3 (ref 4.14–5.8)
SODIUM SERPL-SCNC: 142 MMOL/L (ref 136–145)
TIBC SERPL-MCNC: 271 MCG/DL (ref 298–536)
TRANSFERRIN SERPL-MCNC: 182 MG/DL (ref 200–360)
TRIGL SERPL-MCNC: 106 MG/DL (ref 0–150)
VIT B12 BLD-MCNC: 736 PG/ML (ref 211–946)
VLDLC SERPL-MCNC: 19 MG/DL (ref 5–40)
WBC # BLD AUTO: 3.24 10*3/MM3 (ref 3.4–10.8)

## 2021-10-12 PROCEDURE — 82607 VITAMIN B-12: CPT

## 2021-10-12 PROCEDURE — 83540 ASSAY OF IRON: CPT

## 2021-10-12 PROCEDURE — 82306 VITAMIN D 25 HYDROXY: CPT

## 2021-10-12 PROCEDURE — 80061 LIPID PANEL: CPT

## 2021-10-12 PROCEDURE — 82728 ASSAY OF FERRITIN: CPT

## 2021-10-12 PROCEDURE — 85025 COMPLETE CBC W/AUTO DIFF WBC: CPT

## 2021-10-12 PROCEDURE — 84466 ASSAY OF TRANSFERRIN: CPT

## 2021-10-12 PROCEDURE — 80053 COMPREHEN METABOLIC PANEL: CPT

## 2021-10-13 ENCOUNTER — TELEPHONE (OUTPATIENT)
Dept: FAMILY MEDICINE CLINIC | Facility: CLINIC | Age: 71
End: 2021-10-13

## 2021-10-13 RX ORDER — POTASSIUM CHLORIDE 20 MEQ/1
20 TABLET, EXTENDED RELEASE ORAL 3 TIMES DAILY
Qty: 90 TABLET | Refills: 3 | Status: SHIPPED | OUTPATIENT
Start: 2021-10-13 | End: 2022-06-10 | Stop reason: SDUPTHER

## 2021-10-13 NOTE — TELEPHONE ENCOUNTER
----- Message from Abel Hoffman MD sent at 10/13/2021  7:22 AM CDT -----  Please let pt know labwork stable except potassium continues to be low. Recommend high potassium diet with bananas, tomatoes.  Also increased potassium chloride form BID to TID give 90 pills and 3 refills    Hemoglobin slightly low at 12.8    Recheck on next visit thanks

## 2021-11-03 ENCOUNTER — OFFICE VISIT (OUTPATIENT)
Dept: FAMILY MEDICINE CLINIC | Facility: CLINIC | Age: 71
End: 2021-11-03

## 2021-11-03 VITALS
BODY MASS INDEX: 29.69 KG/M2 | OXYGEN SATURATION: 98 % | WEIGHT: 224 LBS | DIASTOLIC BLOOD PRESSURE: 64 MMHG | HEIGHT: 73 IN | HEART RATE: 82 BPM | TEMPERATURE: 97.5 F | SYSTOLIC BLOOD PRESSURE: 130 MMHG

## 2021-11-03 DIAGNOSIS — D50.9 IRON DEFICIENCY ANEMIA, UNSPECIFIED IRON DEFICIENCY ANEMIA TYPE: ICD-10-CM

## 2021-11-03 DIAGNOSIS — Z85.46 HISTORY OF PROSTATE CANCER: ICD-10-CM

## 2021-11-03 DIAGNOSIS — K21.9 GASTROESOPHAGEAL REFLUX DISEASE, UNSPECIFIED WHETHER ESOPHAGITIS PRESENT: ICD-10-CM

## 2021-11-03 DIAGNOSIS — I10 ESSENTIAL HYPERTENSION: ICD-10-CM

## 2021-11-03 DIAGNOSIS — E55.9 VITAMIN D DEFICIENCY: ICD-10-CM

## 2021-11-03 DIAGNOSIS — N18.2 STAGE 2 CHRONIC KIDNEY DISEASE: ICD-10-CM

## 2021-11-03 DIAGNOSIS — D72.819 LEUKOPENIA, UNSPECIFIED TYPE: ICD-10-CM

## 2021-11-03 DIAGNOSIS — F33.1 MODERATE EPISODE OF RECURRENT MAJOR DEPRESSIVE DISORDER (HCC): Primary | ICD-10-CM

## 2021-11-03 DIAGNOSIS — E66.3 OVERWEIGHT: ICD-10-CM

## 2021-11-03 DIAGNOSIS — E78.2 MIXED HYPERLIPIDEMIA: ICD-10-CM

## 2021-11-03 PROCEDURE — 99214 OFFICE O/P EST MOD 30 MIN: CPT | Performed by: FAMILY MEDICINE

## 2021-11-03 RX ORDER — CITALOPRAM 10 MG/1
10 TABLET ORAL DAILY
Qty: 30 TABLET | Refills: 3 | Status: SHIPPED | OUTPATIENT
Start: 2021-11-03 | End: 2022-06-10 | Stop reason: SDUPTHER

## 2021-11-03 RX ORDER — FAMOTIDINE 20 MG/1
20 TABLET, FILM COATED ORAL 2 TIMES DAILY
Qty: 180 TABLET | Refills: 3 | Status: SHIPPED | OUTPATIENT
Start: 2021-11-03 | End: 2022-08-10

## 2021-12-14 ENCOUNTER — OFFICE VISIT (OUTPATIENT)
Dept: FAMILY MEDICINE CLINIC | Facility: CLINIC | Age: 71
End: 2021-12-14

## 2021-12-14 VITALS
SYSTOLIC BLOOD PRESSURE: 130 MMHG | BODY MASS INDEX: 29 KG/M2 | HEART RATE: 68 BPM | RESPIRATION RATE: 20 BRPM | HEIGHT: 73 IN | TEMPERATURE: 97.5 F | DIASTOLIC BLOOD PRESSURE: 62 MMHG | OXYGEN SATURATION: 99 % | WEIGHT: 218.8 LBS

## 2021-12-14 DIAGNOSIS — R68.84 JAW PAIN: Primary | ICD-10-CM

## 2021-12-14 PROCEDURE — 99214 OFFICE O/P EST MOD 30 MIN: CPT | Performed by: NURSE PRACTITIONER

## 2021-12-14 RX ORDER — ACETAMINOPHEN 500 MG
1000 TABLET ORAL EVERY 6 HOURS PRN
Qty: 90 TABLET | Refills: 0 | Status: SHIPPED | OUTPATIENT
Start: 2021-12-14 | End: 2022-08-10

## 2021-12-14 RX ORDER — METHYLPREDNISOLONE 4 MG/1
TABLET ORAL
Qty: 21 TABLET | Refills: 0 | Status: SHIPPED | OUTPATIENT
Start: 2021-12-14 | End: 2022-08-10

## 2021-12-14 NOTE — PROGRESS NOTES
"Chief Complaint  Earache    Subjective    History of Present Illness {  Problem List  Visit  Diagnosis   Encounters  Notes  Medications  Labs  Result Review Imaging  Media :23}     German Ray presents to [facility] [place] for   C/o bilat \"ear\" pain present for approx 2 weeks - reports pain is located in front of ear - denies popping, ringing, discharge - denies URI sx. Denies grinding teeth or recent dental work. Has not tried anything OTC for pain control. Voices no other c/o or concerns today.        Objective     Physical Exam  Vitals and nursing note reviewed.   Constitutional:       General: He is not in acute distress.     Appearance: Normal appearance. He is not ill-appearing.   HENT:      Head: Normocephalic and atraumatic.      Right Ear: Tympanic membrane, ear canal and external ear normal. There is no impacted cerumen.      Left Ear: Tympanic membrane, ear canal and external ear normal. There is no impacted cerumen.      Nose: Nose normal.      Mouth/Throat:      Mouth: Mucous membranes are moist.      Pharynx: Oropharynx is clear. No posterior oropharyngeal erythema.      Comments: Pain located at TMJ joints bilat with R worse than L with opening mouth and moving jaw from side to side   Eyes:      Conjunctiva/sclera: Conjunctivae normal.      Pupils: Pupils are equal, round, and reactive to light.   Cardiovascular:      Rate and Rhythm: Normal rate and regular rhythm.      Heart sounds: Normal heart sounds.   Pulmonary:      Effort: Pulmonary effort is normal.      Breath sounds: Normal breath sounds.   Musculoskeletal:         General: Normal range of motion.      Cervical back: Normal range of motion and neck supple.   Lymphadenopathy:      Cervical: No cervical adenopathy.   Skin:     General: Skin is warm and dry.   Neurological:      General: No focal deficit present.      Mental Status: He is alert and oriented to person, " "place, and time.   Psychiatric:         Mood and Affect: Mood normal.         Behavior: Behavior normal.        Result Review  Data Reviewed:{ Labs  Result Review  Imaging  Med Tab  Media :23}   The following data was reviewed by (Optional):[id number]}  Common labs    Common Labsle 6/21/21 6/21/21 6/21/21 6/21/21 6/21/21 10/12/21 10/12/21 10/12/21    1437 1437 1437 1437 1437 1037 1037 1037   Glucose   76     96   BUN   20     14   Creatinine   1.20     1.05   eGFR  Am   72     84   Sodium   141     142   Potassium   3.3 (A)     3.3 (A)   Chloride   102     104   Calcium   9.7     9.4   Albumin   4.20     4.20   Total Bilirubin   0.3     0.4   Alkaline Phosphatase   89     79   AST (SGOT)   25     17   ALT (SGPT)   30     15   WBC 4.37     3.24 (A)     Hemoglobin 13.9     12.8 (A)     Hematocrit 41.5     38.5     Platelets 269     222     Total Cholesterol    186   172    Triglycerides    103   106    HDL Cholesterol    65 (A)   46    LDL Cholesterol     103 (A)   107 (A)    Hemoglobin A1C  5.49         PSA     0.719      (A) Abnormal value          No Images in the past 120 days found..       Vital Signs:   /62 (BP Location: Right arm, Patient Position: Sitting, Cuff Size: Adult)   Pulse 68   Temp 97.5 °F (36.4 °C) (Temporal)   Resp 20   Ht 185.4 cm (73\")   Wt 99.2 kg (218 lb 12.8 oz)   SpO2 99%   BMI 28.87 kg/m²          Assessment and Plan {CC Problem List  Visit Diagnosis  ROS  Review (Popup)  Health Maintenance  Quality  BestPractice  Medications  SmartSets  SnapShot Encounters  Media :23}   Problem List Items Addressed This Visit     None      Visit Diagnoses     Jaw pain    -  Primary    Relevant Medications    methylPREDNISolone (MEDROL) 4 MG dose pack    acetaminophen (TYLENOL) 500 MG tablet         Diagnosis Plan   1. Jaw pain  methylPREDNISolone (MEDROL) 4 MG dose pack    acetaminophen (TYLENOL) 500 MG tablet     - Jaw pain - suspect TMJ dysfunction - ENT exam normal no " evidence of OM  - Pt denies grinding teeth at night or recent dental work  - RX for medrol pack prescribed for inflammation - take as directed  - RX for acetaminophen as directed for pain control  - Discussed referral to PT if sx persist  - TMJ education sheet provided  - RTC PRN or f/u with PCP, Dr. Hoffman, as scheduled.     Follow Up {Instructions Charge Capture  Follow-up Communications :23}   Return if symptoms worsen or fail to improve, for Next scheduled follow up.  Patient was given instructions and counseling regarding his condition or for health maintenance advice. Please see specific information pulled into the AVS if appropriate            .  This document has been electronically signed by JOSE MIGUEL Mccord on December 14, 2021 09:15 CST

## 2021-12-14 NOTE — PATIENT INSTRUCTIONS
Temporomandibular Joint Syndrome    Temporomandibular joint syndrome (TMJ syndrome) is a condition that causes pain in the temporomandibular joints. These joints are located near your ears and allow your jaw to open and close. For people with TMJ syndrome, chewing, biting, or other movements of the jaw can be difficult or painful.  TMJ syndrome is often mild and goes away within a few weeks. However, sometimes the condition becomes a long-term (chronic) problem.  What are the causes?  This condition may be caused by:  · Grinding your teeth or clenching your jaw. Some people do this when they are under stress.  · Arthritis.  · Injury to the jaw.  · Head or neck injury.  · Teeth or dentures that are not aligned well.  In some cases, the cause of TMJ syndrome may not be known.  What are the signs or symptoms?  The most common symptom of this condition is an aching pain on the side of the head in the area of the TMJ. Other symptoms may include:  · Pain when moving your jaw, such as when chewing or biting.  · Being unable to open your jaw all the way.  · Making a clicking sound when you open your mouth.  · Headache.  · Earache.  · Neck or shoulder pain.  How is this diagnosed?  This condition may be diagnosed based on:  · Your symptoms and medical history.  · A physical exam. Your health care provider may check the range of motion of your jaw.  · Imaging tests, such as X-rays or an MRI.  You may also need to see your dentist, who will determine if your teeth and jaw are lined up correctly.  How is this treated?  TMJ syndrome often goes away on its own. If treatment is needed, the options may include:  · Eating soft foods and applying ice or heat.  · Medicines to relieve pain or inflammation.  · Medicines or massage to relax the muscles.  · A splint, bite plate, or mouthpiece to prevent teeth grinding or jaw clenching.  · Relaxation techniques or counseling to help reduce stress.  · A therapy for pain in which an  electrical current is applied to the nerves through the skin (transcutaneous electrical nerve stimulation).  · Acupuncture. This is sometimes helpful to relieve pain.  · Jaw surgery. This is rarely needed.  Follow these instructions at home:    Eating and drinking  · Eat a soft diet if you are having trouble chewing.  · Avoid foods that require a lot of chewing. Do not chew gum.  General instructions  · Take over-the-counter and prescription medicines only as told by your health care provider.  · If directed, put ice on the painful area.  ? Put ice in a plastic bag.  ? Place a towel between your skin and the bag.  ? Leave the ice on for 20 minutes, 2-3 times a day.  · Apply a warm, wet cloth (warm compress) to the painful area as directed.  · Massage your jaw area and do any jaw stretching exercises as told by your health care provider.  · If you were given a splint, bite plate, or mouthpiece, wear it as told by your health care provider.  · Keep all follow-up visits as told by your health care provider. This is important.  Contact a health care provider if:  · You are having trouble eating.  · You have new or worsening symptoms.  Get help right away if:  · Your jaw locks open or closed.  Summary  · Temporomandibular joint syndrome (TMJ syndrome) is a condition that causes pain in the temporomandibular joints. These joints are located near your ears and allow your jaw to open and close.  · TMJ syndrome is often mild and goes away within a few weeks. However, sometimes the condition becomes a long-term (chronic) problem.  · Symptoms include an aching pain on the side of the head in the area of the TMJ, pain when chewing or biting, and being unable to open your jaw all the way. You may also make a clicking sound when you open your mouth.  · TMJ syndrome often goes away on its own. If treatment is needed, it may include medicines to relieve pain, reduce inflammation, or relax the muscles. A splint, bite plate, or  mouthpiece may also be used to prevent teeth grinding or jaw clenching.  This information is not intended to replace advice given to you by your health care provider. Make sure you discuss any questions you have with your health care provider.  Document Revised: 03/01/2019 Document Reviewed: 01/29/2019  Elsevier Patient Education © 2021 Elsevier Inc.

## 2022-03-18 RX ORDER — TELMISARTAN 40 MG/1
40 TABLET ORAL DAILY
Qty: 90 TABLET | Refills: 0 | Status: SHIPPED | OUTPATIENT
Start: 2022-03-18 | End: 2022-06-10 | Stop reason: SDUPTHER

## 2022-03-18 RX ORDER — PNV NO.95/FERROUS FUM/FOLIC AC 28MG-0.8MG
325 TABLET ORAL
Qty: 270 TABLET | Refills: 0 | Status: SHIPPED | OUTPATIENT
Start: 2022-03-18 | End: 2022-06-10 | Stop reason: SDUPTHER

## 2022-03-18 RX ORDER — CHLORTHALIDONE 25 MG/1
25 TABLET ORAL DAILY
Qty: 90 TABLET | Refills: 0 | Status: SHIPPED | OUTPATIENT
Start: 2022-03-18 | End: 2022-06-10 | Stop reason: SDUPTHER

## 2022-03-18 NOTE — TELEPHONE ENCOUNTER
Rx Refill Note  Requested Prescriptions     Pending Prescriptions Disp Refills   • chlorthalidone (HYGROTON) 25 MG tablet 90 tablet 1     Sig: Take 1 tablet by mouth Daily.   • telmisartan (MICARDIS) 40 MG tablet 90 tablet 1     Sig: Take 1 tablet by mouth Daily.   • ferrous sulfate 325 (65 Fe) MG tablet 270 tablet 1     Sig: Take 1 tablet by mouth 3 (Three) Times a Day With Meals.      Last office visit with prescribing clinician: 11/3/2021      Next office visit with prescribing clinician: Visit date not found   {TIP  Encounters:    DR ALAS PT   Diuretics Protocol Failed 03/17/2022 03:04 PM   Protocol Details  Normal serum potassium in past 12 months            {TIP  Please add Last Relevant Lab Date if appropriate: 10/12/21-K+ 3.3  {TIP  Is Refill Pharmacy correct? YES  Ricardo Stewart LPN  03/18/22, 08:39 CDT

## 2022-06-10 RX ORDER — DILTIAZEM HYDROCHLORIDE 360 MG/1
360 CAPSULE, EXTENDED RELEASE ORAL DAILY
Qty: 90 CAPSULE | Refills: 0 | Status: SHIPPED | OUTPATIENT
Start: 2022-06-10 | End: 2022-08-10 | Stop reason: SDUPTHER

## 2022-06-10 RX ORDER — SILDENAFIL CITRATE 100 MG
100 TABLET ORAL DAILY PRN
Qty: 10 TABLET | Refills: 3 | Status: SHIPPED | OUTPATIENT
Start: 2022-06-10 | End: 2022-08-10 | Stop reason: SDUPTHER

## 2022-06-10 RX ORDER — PNV NO.95/FERROUS FUM/FOLIC AC 28MG-0.8MG
325 TABLET ORAL
Qty: 270 TABLET | Refills: 0 | Status: SHIPPED | OUTPATIENT
Start: 2022-06-10 | End: 2022-08-10 | Stop reason: SDUPTHER

## 2022-06-10 RX ORDER — ERGOCALCIFEROL 1.25 MG/1
50000 CAPSULE ORAL
Qty: 12 CAPSULE | Refills: 0 | Status: SHIPPED | OUTPATIENT
Start: 2022-06-10 | End: 2022-08-10 | Stop reason: SDUPTHER

## 2022-06-10 RX ORDER — POTASSIUM CHLORIDE 20 MEQ/1
20 TABLET, EXTENDED RELEASE ORAL 3 TIMES DAILY
Qty: 90 TABLET | Refills: 0 | Status: SHIPPED | OUTPATIENT
Start: 2022-06-10 | End: 2022-08-10 | Stop reason: SDUPTHER

## 2022-06-10 RX ORDER — CITALOPRAM 10 MG/1
10 TABLET ORAL DAILY
Qty: 30 TABLET | Refills: 3 | Status: SHIPPED | OUTPATIENT
Start: 2022-06-10 | End: 2022-08-10 | Stop reason: SDUPTHER

## 2022-06-10 RX ORDER — PRAVASTATIN SODIUM 80 MG/1
80 TABLET ORAL NIGHTLY
Qty: 90 TABLET | Refills: 0 | Status: SHIPPED | OUTPATIENT
Start: 2022-06-10 | End: 2022-08-10 | Stop reason: SDUPTHER

## 2022-06-10 RX ORDER — TELMISARTAN 40 MG/1
40 TABLET ORAL DAILY
Qty: 90 TABLET | Refills: 0 | Status: SHIPPED | OUTPATIENT
Start: 2022-06-10 | End: 2022-08-10 | Stop reason: SDUPTHER

## 2022-06-10 RX ORDER — CHLORTHALIDONE 25 MG/1
25 TABLET ORAL DAILY
Qty: 90 TABLET | Refills: 0 | Status: SHIPPED | OUTPATIENT
Start: 2022-06-10 | End: 2022-08-10 | Stop reason: SDUPTHER

## 2022-06-10 NOTE — TELEPHONE ENCOUNTER
Rx Refill Note  Requested Prescriptions     Pending Prescriptions Disp Refills   • chlorthalidone (HYGROTON) 25 MG tablet 90 tablet 0     Sig: Take 1 tablet by mouth Daily.   • dilTIAZem (TIAZAC) 360 MG 24 hr capsule 90 capsule 3     Sig: Take 1 capsule by mouth Daily.   • ferrous sulfate 325 (65 Fe) MG tablet 270 tablet 0     Sig: Take 1 tablet by mouth 3 (Three) Times a Day With Meals.   • potassium chloride (K-DUR,KLOR-CON) 20 MEQ CR tablet 90 tablet 3     Sig: Take 1 tablet by mouth 3 (Three) Times a Day.   • pravastatin (PRAVACHOL) 80 MG tablet 90 tablet 3     Sig: Take 1 tablet by mouth Every Night.   • telmisartan (MICARDIS) 40 MG tablet 90 tablet 0     Sig: Take 1 tablet by mouth Daily.   • vitamin D (ERGOCALCIFEROL) 1.25 MG (65277 UT) capsule capsule 12 capsule 3     Sig: Take 1 capsule by mouth Every 7 (Seven) Days.   • citalopram (CeleXA) 10 MG tablet 30 tablet 3     Sig: Take 1 tablet by mouth Daily.   • Viagra 100 MG tablet 10 tablet 3     Sig: Take 1 tablet by mouth Daily As Needed for Erectile Dysfunction.      Last office visit with prescribing clinician: 11/3/2021      Next office visit with prescribing clinician: Visit date not found   {TIP  Encounters:     Diuretics Protocol Failed 06/10/2022 11:17 AM   Protocol Details  Normal serum potassium in past 12 months         Calcium-Channel Blockers Protocol Failed 06/10/2022 11:17 AM   Protocol Details  Normal serum potassium in past 12 months           Potassium Supplement Protocol Failed 06/10/2022 11:17 AM   Protocol Details  Normal serum potassium on file in past 12 months          {TIP  Please add Last Relevant Lab Date if appropriate  {TIP  Is Refill Pharmacy correct? yes  Ricardo Stewart LPN  06/10/22, 13:05 CDT

## 2022-08-02 NOTE — PROGRESS NOTES
Subjective:  German Ray is a 71 y.o. male who presents for       Patient Active Problem List   Diagnosis   • Hyperlipidemia   • Impaired fasting glucose    • Vitamin D deficiency    • Depression   • Bereavement   • Anemia   • Iron deficiency anemia   • Need for hepatitis C screening test   • Palpable mass of neck   • Encounter for immunization   • Radiation proctitis   • Diverticulosis of large intestine without hemorrhage   • Lower GI bleeding   • Pruritic rash   • Lipoma of back   • Essential hypertension   • Erectile dysfunction   • Need for vaccination   • Medicare annual wellness visit, initial   • Rectal bleeding   • Change in bowel habits   • General medical examination   • Sleep disorder   • Other fatigue   • Obesity   • Prediabetes   • Encounter for screening for endocrine disorder   • Moderate episode of recurrent major depressive disorder (HCC)   • Unintentional weight loss   • Encounter for screening for malignant neoplasm of prostate    • History of prostate cancer   • Blood in stool   • Nasal congestion   • Class 1 obesity with serious comorbidity and body mass index (BMI) of 30.0 to 30.9 in adult   • Erectile dysfunction following radiation therapy   • Hematochezia   • Hypokalemia   • Overweight   • Leukopenia   • Stage 2 chronic kidney disease   • Gastroesophageal reflux disease   • History of rectal ulcer           Current Outpatient Medications:   •  calcium carbonate-vitamin d 600-400 MG-UNIT per tablet, Take 1 tablet by mouth Daily., Disp: 90 tablet, Rfl: 1  •  chlorthalidone (HYGROTON) 25 MG tablet, Take 1 tablet by mouth Daily., Disp: 90 tablet, Rfl: 1  •  dilTIAZem (TIAZAC) 360 MG 24 hr capsule, Take 1 capsule by mouth Daily., Disp: 90 capsule, Rfl: 1  •  doxazosin (CARDURA) 8 MG tablet, Take 1 tablet by mouth Every Night., Disp: 90 tablet, Rfl: 3  •  ferrous sulfate 325 (65 Fe) MG tablet, Take 1 tablet by mouth 3 (Three) Times a Day With Meals., Disp: 270 tablet, Rfl: 1  •   potassium chloride (K-DUR,KLOR-CON) 20 MEQ CR tablet, Take 1 tablet by mouth 3 (Three) Times a Day., Disp: 270 tablet, Rfl: 1  •  pravastatin (PRAVACHOL) 80 MG tablet, Take 1 tablet by mouth Every Night., Disp: 90 tablet, Rfl: 1  •  telmisartan (MICARDIS) 40 MG tablet, Take 1 tablet by mouth Daily., Disp: 90 tablet, Rfl: 1  •  Viagra 100 MG tablet, Take 1 tablet by mouth Daily As Needed for Erectile Dysfunction., Disp: 10 tablet, Rfl: 3  •  vitamin D (ERGOCALCIFEROL) 1.25 MG (10207 UT) capsule capsule, Take 1 capsule by mouth Every 7 (Seven) Days., Disp: 12 capsule, Rfl: 0      Pt is 70 yo male with management of being overweight, ED, HTN, major depression, HLP, history of prostate cancer with radiation therapy, iron deficiency anemia, history of rectal bleeding/ulcer, history of radiation proctitis, diverticulosis, vitamin D deficiency, prediabetes, sp appendectomy, sp cataract surgery, gastritis, CKD stage 2      11/3/21 in office visit for recheck on pt's above medical issues. Pt had labwork done on 10/12/21 taht showed vitmain D and b12 stable iron profile stabl. Lipid panel showed LDL at 107. CMP showed potassium at 3.3  With GFR at 84. CBC showed WBC at 3.24 and hemoglobin at 12. 8. Pt continues to take his medications for HTN, GERD, iron defciency, vitamin D  Deficiency, HLP.Pt's BP at goal  He did have a couple of  Low readings.  No chest pain no dizziness pt doing well overall. His depression is back and he would like to start back on celexa     8/10/22 in office visit for recheck. Pt is due for new labwork. Pt continues to followup with Radiation Oncologist for history of prostate cancer. Pt has noticed recent blood on toilet paper. He has history of radiation proctitis and rectal bleeding. He has not had recent appt with GI. His last appt with GI with [facility] was in 2019. He also has a history of rectal ulcer. He goes back and forth to [place].  No chest pain no dizziness. Breathing stable. No chest pain no  dizziness       GI Problem  The primary symptoms include fatigue, abdominal pain and arthralgias. Primary symptoms do not include fever, weight loss, nausea, vomiting, diarrhea, melena, hematemesis, jaundice, hematochezia, dysuria or myalgias. Primary symptoms comment: rectal bleeding . The onset was gradual.   The illness does not include chills. Associated medical issues do not include inflammatory bowel disease, GERD, gallstones, liver disease, alcohol abuse, PUD, gastric bypass, bowel resection, irritable bowel syndrome, hemorrhoids or diverticulitis. Associated medical issues comments: rectal bleeding, radiation proctitis .   Chronic Kidney Disease  This is a chronic problem. The current episode started more than 1 year ago. The problem occurs constantly. The problem has been unchanged. Associated symptoms include arthralgias and fatigue. Pertinent negatives include no abdominal pain, anorexia, change in bowel habit, chest pain, chills, congestion, coughing, diaphoresis, fever, headaches, joint swelling, myalgias, nausea, neck pain, numbness, rash, sore throat, swollen glands, urinary symptoms, vertigo, visual change, vomiting or weakness. Nothing aggravates the symptoms. He has tried nothing for the symptoms. The treatment provided no relief.   Heartburn  He complains of heartburn and water brash. He reports no abdominal pain, no belching, no chest pain, no choking, no coughing, no dysphagia, no early satiety, no globus sensation, no hoarse voice, no nausea, no sore throat, no stridor, no tooth decay or no wheezing. This is a recurrent problem. The current episode started today. The problem occurs constantly. The problem has been unchanged. The heartburn is of moderate intensity. The heartburn does not wake him from sleep. The heartburn does not limit his activity. Nothing aggravates the symptoms. Associated symptoms include fatigue. He has tried nothing for the symptoms. The treatment provided no relief. Past  procedures do not include an abdominal ultrasound, an EGD, esophageal manometry, esophageal pH monitoring, H. pylori antibody titer or a UGI. Past invasive treatments do not include gastroplasty, gastroplication or reflux surgery.   Hyperlipidemia  This is a chronic problem. The current episode started more than 1 year ago. The problem is controlled. Recent lipid tests were reviewed and are variable. He has no history of chronic renal disease, diabetes, hypothyroidism, liver disease, obesity or nephrotic syndrome. The current treatment provides moderate improvement of lipids. There are no compliance problems.  Risk factors for coronary artery disease include hypertension, male sex, a sedentary lifestyle and dyslipidemia.   Male  Problem  The patient's pertinent negatives include no genital injury, genital itching, genital lesions, pelvic pain, penile discharge, penile pain, priapism, scrotal swelling or testicular pain. Primary symptoms comment: history of prostate cancer . This is a chronic problem. The current episode started more than 1 month ago. The problem occurs constantly. The problem has been unchanged. Pertinent negatives include no abdominal pain, anorexia, chest pain, chills, constipation, coughing, diarrhea, discolored urine, dysuria, fever, flank pain, frequency, headaches, hematuria, hesitancy, joint pain, joint swelling, nausea, painful intercourse, rash, shortness of breath, sore throat, urgency, urinary retention or vomiting.   Hypertension   This is a chronic problem. The current episode started more than 1 year ago. The problem is unchanged. The problem is controlled. Pertinent negatives include no anxiety, blurred vision, chest pain, headaches, malaise/fatigue, neck pain, orthopnea, palpitations, peripheral edema, PND, shortness of breath or sweats. Risk factors for coronary artery disease include male gender, obesity, dyslipidemia and sedentary lifestyle. Past treatments include angiotensin  blockers, calcium channel blockers and diuretics. Current antihypertension treatment includes angiotensin blockers, calcium channel blockers and diuretics. The current treatment provides moderate improvement. There is no history of angina, kidney disease, CAD/MI, CVA, heart failure, left ventricular hypertrophy, PVD or retinopathy. There is no history of chronic renal disease, coarctation of the aorta, hyperaldosteronism, hypercortisolism, hyperparathyroidism, a hypertension causing med, pheochromocytoma, renovascular disease, sleep apnea or a thyroid problem.   Anemia   Presents for follow-up visit. There has been no abdominal pain, anorexia, bruising/bleeding easily, confusion, fever, leg swelling, light-headedness, malaise/fatigue, pallor, palpitations, paresthesias, pica or weight loss. Signs of blood loss that are not present include hematemesis, hematochezia, melena, menorrhagia and vaginal bleeding. There is no history of chronic renal disease or heart failure. There are no compliance problems.  Compliance with medications is 0-25%.     Review of Systems  Review of Systems   Constitutional: Positive for activity change and fatigue. Negative for appetite change, chills, diaphoresis, fever and weight loss.   HENT: Negative for congestion, postnasal drip, rhinorrhea, sinus pressure, sinus pain, sneezing, sore throat, trouble swallowing and voice change.    Respiratory: Negative for cough, choking, chest tightness, shortness of breath, wheezing and stridor.    Cardiovascular: Negative for chest pain.   Gastrointestinal: Positive for abdominal pain and blood in stool. Negative for diarrhea, hematemesis, hematochezia, jaundice, melena, nausea and vomiting.   Genitourinary: Negative for dysuria.   Musculoskeletal: Positive for arthralgias. Negative for myalgias.   Neurological: Positive for weakness and numbness. Negative for headaches.   Psychiatric/Behavioral: The patient is nervous/anxious.        Patient Active  Problem List   Diagnosis   • Hyperlipidemia   • Impaired fasting glucose    • Vitamin D deficiency    • Depression   • Bereavement   • Anemia   • Iron deficiency anemia   • Need for hepatitis C screening test   • Palpable mass of neck   • Encounter for immunization   • Radiation proctitis   • Diverticulosis of large intestine without hemorrhage   • Lower GI bleeding   • Pruritic rash   • Lipoma of back   • Essential hypertension   • Erectile dysfunction   • Need for vaccination   • Medicare annual wellness visit, initial   • Rectal bleeding   • Change in bowel habits   • General medical examination   • Sleep disorder   • Other fatigue   • Obesity   • Prediabetes   • Encounter for screening for endocrine disorder   • Moderate episode of recurrent major depressive disorder (HCC)   • Unintentional weight loss   • Encounter for screening for malignant neoplasm of prostate    • History of prostate cancer   • Blood in stool   • Nasal congestion   • Class 1 obesity with serious comorbidity and body mass index (BMI) of 30.0 to 30.9 in adult   • Erectile dysfunction following radiation therapy   • Hematochezia   • Hypokalemia   • Overweight   • Leukopenia   • Stage 2 chronic kidney disease   • Gastroesophageal reflux disease   • History of rectal ulcer     Past Surgical History:   Procedure Laterality Date   • APPENDECTOMY     • CATARACT EXTRACTION Bilateral 2013   • COLONOSCOPY      diverticulosis in hte sigmoid colon. Altered vascular mucosa in hte rectum. hemorrhoids, no specimens collected, radation proctitis   • COLONOSCOPY N/A 10/19/2017    Procedure: COLONOSCOPY;  Surgeon: Patric Wooten MD;  Location: [facility] ENDOSCOPY;  Service:      Social History     Socioeconomic History   • Marital status:    Tobacco Use   • Smoking status: Never Smoker   • Smokeless tobacco: Never Used   Vaping Use   • Vaping Use: Never used   Substance and Sexual Activity   • Alcohol use: Yes     Comment: 08/30/2018 - Patient reports  occasional consumption of alcoholic beverages.   • Drug use: No   • Sexual activity: Defer     Family History   Problem Relation Age of Onset   • Cancer Mother    • Hypertension Brother      No visits with results within 6 Month(s) from this visit.   Latest known visit with results is:   Lab on 10/12/2021   Component Date Value Ref Range Status   • WBC 10/12/2021 3.24 (A) 3.40 - 10.80 10*3/mm3 Final   • RBC 10/12/2021 4.18  4.14 - 5.80 10*6/mm3 Final   • Hemoglobin 10/12/2021 12.8 (A) 13.0 - 17.7 g/dL Final   • Hematocrit 10/12/2021 38.5  37.5 - 51.0 % Final   • MCV 10/12/2021 92.1  79.0 - 97.0 fL Final   • MCH 10/12/2021 30.6  26.6 - 33.0 pg Final   • MCHC 10/12/2021 33.2  31.5 - 35.7 g/dL Final   • RDW 10/12/2021 12.5  12.3 - 15.4 % Final   • RDW-SD 10/12/2021 41.8  37.0 - 54.0 fl Final   • MPV 10/12/2021 10.0  6.0 - 12.0 fL Final   • Platelets 10/12/2021 222  140 - 450 10*3/mm3 Final   • Neutrophil % 10/12/2021 50.3  42.7 - 76.0 % Final   • Lymphocyte % 10/12/2021 33.3  19.6 - 45.3 % Final   • Monocyte % 10/12/2021 9.6  5.0 - 12.0 % Final   • Eosinophil % 10/12/2021 5.9  0.3 - 6.2 % Final   • Basophil % 10/12/2021 0.9  0.0 - 1.5 % Final   • Immature Grans % 10/12/2021 0.0  0.0 - 0.5 % Final   • Neutrophils, Absolute 10/12/2021 1.63 (A) 1.70 - 7.00 10*3/mm3 Final   • Lymphocytes, Absolute 10/12/2021 1.08  0.70 - 3.10 10*3/mm3 Final   • Monocytes, Absolute 10/12/2021 0.31  0.10 - 0.90 10*3/mm3 Final   • Eosinophils, Absolute 10/12/2021 0.19  0.00 - 0.40 10*3/mm3 Final   • Basophils, Absolute 10/12/2021 0.03  0.00 - 0.20 10*3/mm3 Final   • Immature Grans, Absolute 10/12/2021 0.00  0.00 - 0.05 10*3/mm3 Final   • nRBC 10/12/2021 0.0  0.0 - 0.2 /100 WBC Final   • Glucose 10/12/2021 96  65 - 99 mg/dL Final   • BUN 10/12/2021 14  8 - 23 mg/dL Final   • Creatinine 10/12/2021 1.05  0.76 - 1.27 mg/dL Final   • Sodium 10/12/2021 142  136 - 145 mmol/L Final   • Potassium 10/12/2021 3.3 (A) 3.5 - 5.2 mmol/L Final   • Chloride  10/12/2021 104  98 - 107 mmol/L Final   • CO2 10/12/2021 28.5  22.0 - 29.0 mmol/L Final   • Calcium 10/12/2021 9.4  8.6 - 10.5 mg/dL Final   • Total Protein 10/12/2021 7.0  6.0 - 8.5 g/dL Final   • Albumin 10/12/2021 4.20  3.50 - 5.20 g/dL Final   • ALT (SGPT) 10/12/2021 15  1 - 41 U/L Final   • AST (SGOT) 10/12/2021 17  1 - 40 U/L Final   • Alkaline Phosphatase 10/12/2021 79  39 - 117 U/L Final   • Total Bilirubin 10/12/2021 0.4  0.0 - 1.2 mg/dL Final   • eGFR   Amer 10/12/2021 84  >60 mL/min/1.73 Final   • Globulin 10/12/2021 2.8  gm/dL Final   • A/G Ratio 10/12/2021 1.5  g/dL Final   • BUN/Creatinine Ratio 10/12/2021 13.3  7.0 - 25.0 Final   • Anion Gap 10/12/2021 9.5  5.0 - 15.0 mmol/L Final   • Total Cholesterol 10/12/2021 172  0 - 200 mg/dL Final   • Triglycerides 10/12/2021 106  0 - 150 mg/dL Final   • HDL Cholesterol 10/12/2021 46  40 - 60 mg/dL Final   • LDL Cholesterol  10/12/2021 107 (A) 0 - 100 mg/dL Final   • VLDL Cholesterol 10/12/2021 19  5 - 40 mg/dL Final   • LDL/HDL Ratio 10/12/2021 2.28   Final   • 25 Hydroxy, Vitamin D 10/12/2021 64.7  ng/ml Final   • Vitamin B-12 10/12/2021 736  211 - 946 pg/mL Final   • Iron 10/12/2021 62  59 - 158 mcg/dL Final   • Iron Saturation 10/12/2021 23  20 - 50 % Final   • Transferrin 10/12/2021 182 (A) 200 - 360 mg/dL Final   • TIBC 10/12/2021 271 (A) 298 - 536 mcg/dL Final   • Ferritin 10/12/2021 264.00  30.00 - 400.00 ng/mL Final      No image results found.    [unfilled]  Immunization History   Administered Date(s) Administered   • COVID-19 (MODERNA) 1st, 2nd, 3rd Dose Only 02/01/2021, 02/23/2021, 02/23/2021, 03/01/2021, 03/23/2021, 03/23/2021, 11/16/2021   • Flu Vaccine Quad PF >36MO 11/15/2017   • Fluad Quad 65+ 09/16/2020   • Fluzone Split Quad (Multi-dose) 09/16/2020   • Influenza Split Preservative Free ID 10/20/2016   • Pneumococcal Polysaccharide (PPSV23) 03/31/2017   • Tdap 03/31/2017   • influenza Split 10/20/2016       The following portions of  "the patient's history were reviewed and updated as appropriate: allergies, current medications, past family history, past medical history, past social history, past surgical history and problem list.        Physical Exam  /68 (BP Location: Left arm, Patient Position: Sitting, Cuff Size: Adult)   Pulse 68   Temp 97.5 °F (36.4 °C)   Ht 185.4 cm (73\")   Wt 101 kg (222 lb)   SpO2 98%   BMI 29.29 kg/m²     Physical Exam  Vitals and nursing note reviewed.   Constitutional:       Appearance: He is well-developed. He is not diaphoretic.   HENT:      Head: Normocephalic and atraumatic.      Right Ear: External ear normal.   Eyes:      Conjunctiva/sclera: Conjunctivae normal.      Pupils: Pupils are equal, round, and reactive to light.   Cardiovascular:      Rate and Rhythm: Normal rate and regular rhythm.      Heart sounds: Normal heart sounds. No murmur heard.  Pulmonary:      Effort: Pulmonary effort is normal. No respiratory distress.      Breath sounds: Normal breath sounds.   Abdominal:      General: Bowel sounds are normal. There is no distension.      Palpations: Abdomen is soft.      Tenderness: There is abdominal tenderness.   Musculoskeletal:         General: Tenderness present. No deformity. Normal range of motion.      Cervical back: Normal range of motion and neck supple.   Skin:     General: Skin is warm.      Coloration: Skin is not pale.      Findings: No erythema or rash.   Neurological:      Mental Status: He is alert and oriented to person, place, and time.      Cranial Nerves: No cranial nerve deficit.   Psychiatric:         Behavior: Behavior normal.         [unfilled]   Diagnosis Plan   1. Essential hypertension  CBC Auto Differential    Comprehensive Metabolic Panel    Lipid Panel    TSH    T4, Free    Vitamin D 25 Hydroxy    Vitamin B12    Iron Profile    Ferritin   2. History of prostate cancer  CBC Auto Differential    Comprehensive Metabolic Panel    Lipid Panel    TSH    T4, Free "    Vitamin D 25 Hydroxy    Vitamin B12    Iron Profile    Ferritin   3. Prediabetes  CBC Auto Differential    Comprehensive Metabolic Panel    Lipid Panel    TSH    T4, Free    Vitamin D 25 Hydroxy    Vitamin B12    Iron Profile    Ferritin   4. Blood in stool  CBC Auto Differential    Comprehensive Metabolic Panel    Lipid Panel    TSH    T4, Free    Vitamin D 25 Hydroxy    Vitamin B12    Iron Profile    Ferritin   5. Mixed hyperlipidemia  CBC Auto Differential    Comprehensive Metabolic Panel    Lipid Panel    TSH    T4, Free    Vitamin D 25 Hydroxy    Vitamin B12    Iron Profile    Ferritin   6. Vitamin D deficiency   CBC Auto Differential    Comprehensive Metabolic Panel    Lipid Panel    TSH    T4, Free    Vitamin D 25 Hydroxy    Vitamin B12    Iron Profile    Ferritin   7. Iron deficiency anemia, unspecified iron deficiency anemia type  CBC Auto Differential    Comprehensive Metabolic Panel    Lipid Panel    TSH    T4, Free    Vitamin D 25 Hydroxy    Vitamin B12    Iron Profile    Ferritin   8. Stage 2 chronic kidney disease  CBC Auto Differential    Comprehensive Metabolic Panel    Lipid Panel    TSH    T4, Free    Vitamin D 25 Hydroxy    Vitamin B12    Iron Profile    Ferritin   9. Gastroesophageal reflux disease, unspecified whether esophagitis present  CBC Auto Differential    Comprehensive Metabolic Panel    Lipid Panel    TSH    T4, Free    Vitamin D 25 Hydroxy    Vitamin B12    Iron Profile    Ferritin   10. Radiation proctitis  CBC Auto Differential    Comprehensive Metabolic Panel    Lipid Panel    TSH    T4, Free    Vitamin D 25 Hydroxy    Vitamin B12    Iron Profile    Ferritin   11. Rectal bleeding  CBC Auto Differential    Comprehensive Metabolic Panel    Lipid Panel    TSH    T4, Free    Vitamin D 25 Hydroxy    Vitamin B12    Iron Profile    Ferritin   12. History of rectal ulcer               -recommend labwork  -recommend shingles vaccination   -ED - on viagra 100 mg PO PRN.   -GERD will get  last EGD and colonoscopy from Dr. Ryan. Start on pepcid 20 mg PO BID. Information provided   -CKD stage 2 - continue to monitor  -hypokalemia - on potassium supplement     -history of prostate cancer -[facility]rology in [facility] -last PSA was normal.  -fatigue/sleep disorder -  Pt referred to sleep study with Dr. Bella but did not complete sleep study. Pt was given number to reschedule sleep study  -Vitamin D deficiency - continue vitamin D pill. Recheck vitamin D levels    hypertension -   on chlorthalidone 25  mg dialy. on micardis 40 mg PO q daily. And dilitzem 360 mg PO q daily.  on cardura 8 mg PO qhs.     -overweight  - counseled weight loss >5 minutes BMI at 29.29  -hyperlipidemia -Continue pravastatin.  recommend heart healthy diet   -history rectal ulcer rectal ulcer/diverticulosis/ rectal bleeding/history of radiation proctitis -  was on  mesalamine suppository 1000 mg rectal at bedtime.  Recommend high fiber diet.  pt states he noticed a little blood. He is not concerned. If getting worse he will let me know.  Would consider referral back to GI   -depression - stopped celexa 20 mg PO q daily.   -iron deficiency anemia - Hematologist following.on ferrous sulfate 325 mg PO TID   -advised pt to be safe and call with questions and concerns  -advised pt to go to ER or call 911 if symptoms worrisome or severe  -advised to followup with specialist and referrals   I spent 30  minutes caring for German on this date of service. This time includes time spent by me in the following activities: preparing for the visit, reviewing tests, obtaining and/or reviewing a separately obtained history, performing a medically appropriate examination and/or evaluation, counseling and educating the patient/family/caregiver, ordering medications, tests, or procedures, referring and communicating with other health care professionals, documenting information in the medical record, independently interpreting results and  communicating that information with the patient/family/caregiver and care coordination.   -recheck in 3 months         This document has been electronically signed by Abel Hoffman MD on August 10, 2022 13:42 CDT

## 2022-08-10 ENCOUNTER — OFFICE VISIT (OUTPATIENT)
Dept: FAMILY MEDICINE CLINIC | Facility: CLINIC | Age: 72
End: 2022-08-10

## 2022-08-10 VITALS
HEART RATE: 68 BPM | DIASTOLIC BLOOD PRESSURE: 68 MMHG | HEIGHT: 73 IN | SYSTOLIC BLOOD PRESSURE: 140 MMHG | WEIGHT: 222 LBS | OXYGEN SATURATION: 98 % | BODY MASS INDEX: 29.42 KG/M2 | TEMPERATURE: 97.5 F

## 2022-08-10 DIAGNOSIS — I10 ESSENTIAL HYPERTENSION: Primary | ICD-10-CM

## 2022-08-10 DIAGNOSIS — K92.1 BLOOD IN STOOL: ICD-10-CM

## 2022-08-10 DIAGNOSIS — R73.03 PREDIABETES: ICD-10-CM

## 2022-08-10 DIAGNOSIS — K62.5 RECTAL BLEEDING: ICD-10-CM

## 2022-08-10 DIAGNOSIS — Z87.19 HISTORY OF RECTAL ULCER: ICD-10-CM

## 2022-08-10 DIAGNOSIS — D50.9 IRON DEFICIENCY ANEMIA, UNSPECIFIED IRON DEFICIENCY ANEMIA TYPE: ICD-10-CM

## 2022-08-10 DIAGNOSIS — K21.9 GASTROESOPHAGEAL REFLUX DISEASE, UNSPECIFIED WHETHER ESOPHAGITIS PRESENT: ICD-10-CM

## 2022-08-10 DIAGNOSIS — Z85.46 HISTORY OF PROSTATE CANCER: ICD-10-CM

## 2022-08-10 DIAGNOSIS — N18.2 STAGE 2 CHRONIC KIDNEY DISEASE: ICD-10-CM

## 2022-08-10 DIAGNOSIS — E55.9 VITAMIN D DEFICIENCY: ICD-10-CM

## 2022-08-10 DIAGNOSIS — E78.2 MIXED HYPERLIPIDEMIA: ICD-10-CM

## 2022-08-10 DIAGNOSIS — K62.7 RADIATION PROCTITIS: ICD-10-CM

## 2022-08-10 PROCEDURE — 99214 OFFICE O/P EST MOD 30 MIN: CPT | Performed by: FAMILY MEDICINE

## 2022-08-10 RX ORDER — SILDENAFIL CITRATE 100 MG
100 TABLET ORAL DAILY PRN
Qty: 10 TABLET | Refills: 3 | Status: SHIPPED | OUTPATIENT
Start: 2022-08-10

## 2022-08-10 RX ORDER — PRAVASTATIN SODIUM 80 MG/1
80 TABLET ORAL NIGHTLY
Qty: 90 TABLET | Refills: 1 | Status: SHIPPED | OUTPATIENT
Start: 2022-08-10 | End: 2022-11-14 | Stop reason: CLARIF

## 2022-08-10 RX ORDER — CITALOPRAM 10 MG/1
10 TABLET ORAL DAILY
Qty: 90 TABLET | Refills: 1 | Status: SHIPPED | OUTPATIENT
Start: 2022-08-10 | End: 2022-08-10

## 2022-08-10 RX ORDER — POTASSIUM CHLORIDE 20 MEQ/1
20 TABLET, EXTENDED RELEASE ORAL 3 TIMES DAILY
Qty: 270 TABLET | Refills: 1 | Status: SHIPPED | OUTPATIENT
Start: 2022-08-10 | End: 2023-02-28 | Stop reason: SDUPTHER

## 2022-08-10 RX ORDER — TELMISARTAN 40 MG/1
40 TABLET ORAL DAILY
Qty: 90 TABLET | Refills: 1 | Status: SHIPPED | OUTPATIENT
Start: 2022-08-10 | End: 2023-02-28 | Stop reason: SDUPTHER

## 2022-08-10 RX ORDER — DILTIAZEM HYDROCHLORIDE 360 MG/1
360 CAPSULE, EXTENDED RELEASE ORAL DAILY
Qty: 90 CAPSULE | Refills: 1 | Status: SHIPPED | OUTPATIENT
Start: 2022-08-10 | End: 2023-02-28 | Stop reason: SDUPTHER

## 2022-08-10 RX ORDER — ERGOCALCIFEROL 1.25 MG/1
50000 CAPSULE ORAL
Qty: 12 CAPSULE | Refills: 0 | Status: SHIPPED | OUTPATIENT
Start: 2022-08-10 | End: 2022-12-01 | Stop reason: SDUPTHER

## 2022-08-10 RX ORDER — CHLORTHALIDONE 25 MG/1
25 TABLET ORAL DAILY
Qty: 90 TABLET | Refills: 1 | Status: SHIPPED | OUTPATIENT
Start: 2022-08-10 | End: 2023-02-28 | Stop reason: SDUPTHER

## 2022-08-10 RX ORDER — DOXAZOSIN 8 MG/1
8 TABLET ORAL NIGHTLY
Qty: 90 TABLET | Refills: 3 | Status: SHIPPED | OUTPATIENT
Start: 2022-08-10

## 2022-08-10 RX ORDER — PNV NO.95/FERROUS FUM/FOLIC AC 28MG-0.8MG
325 TABLET ORAL
Qty: 270 TABLET | Refills: 1 | Status: SHIPPED | OUTPATIENT
Start: 2022-08-10 | End: 2023-02-28 | Stop reason: SDUPTHER

## 2022-08-10 NOTE — PATIENT INSTRUCTIONS
Do not take Cardura and viagra at the same time     Bring blood pressure readings next visit.     Get labwork fasting.

## 2022-08-11 ENCOUNTER — LAB (OUTPATIENT)
Dept: LAB | Facility: HOSPITAL | Age: 72
End: 2022-08-11

## 2022-08-11 DIAGNOSIS — R73.03 PREDIABETES: ICD-10-CM

## 2022-08-11 DIAGNOSIS — I10 ESSENTIAL HYPERTENSION: ICD-10-CM

## 2022-08-11 DIAGNOSIS — E78.2 MIXED HYPERLIPIDEMIA: ICD-10-CM

## 2022-08-11 DIAGNOSIS — N18.2 STAGE 2 CHRONIC KIDNEY DISEASE: ICD-10-CM

## 2022-08-11 DIAGNOSIS — K92.1 BLOOD IN STOOL: ICD-10-CM

## 2022-08-11 DIAGNOSIS — K62.7 RADIATION PROCTITIS: ICD-10-CM

## 2022-08-11 DIAGNOSIS — K62.5 RECTAL BLEEDING: ICD-10-CM

## 2022-08-11 DIAGNOSIS — E55.9 VITAMIN D DEFICIENCY: ICD-10-CM

## 2022-08-11 DIAGNOSIS — D50.9 IRON DEFICIENCY ANEMIA, UNSPECIFIED IRON DEFICIENCY ANEMIA TYPE: ICD-10-CM

## 2022-08-11 DIAGNOSIS — Z85.46 HISTORY OF PROSTATE CANCER: ICD-10-CM

## 2022-08-11 DIAGNOSIS — K21.9 GASTROESOPHAGEAL REFLUX DISEASE, UNSPECIFIED WHETHER ESOPHAGITIS PRESENT: ICD-10-CM

## 2022-08-11 LAB
25(OH)D3 SERPL-MCNC: 64.9 NG/ML (ref 30–100)
ALBUMIN SERPL-MCNC: 3.6 G/DL (ref 3.5–5.2)
ALBUMIN/GLOB SERPL: 1.4 G/DL
ALP SERPL-CCNC: 76 U/L (ref 39–117)
ALT SERPL W P-5'-P-CCNC: 13 U/L (ref 1–41)
ANION GAP SERPL CALCULATED.3IONS-SCNC: 10.5 MMOL/L (ref 5–15)
AST SERPL-CCNC: 13 U/L (ref 1–40)
BASOPHILS # BLD AUTO: 0.04 10*3/MM3 (ref 0–0.2)
BASOPHILS NFR BLD AUTO: 1.3 % (ref 0–1.5)
BILIRUB SERPL-MCNC: 0.4 MG/DL (ref 0–1.2)
BUN SERPL-MCNC: 14 MG/DL (ref 8–23)
BUN/CREAT SERPL: 13.5 (ref 7–25)
CALCIUM SPEC-SCNC: 9.3 MG/DL (ref 8.6–10.5)
CHLORIDE SERPL-SCNC: 105 MMOL/L (ref 98–107)
CHOLEST SERPL-MCNC: 183 MG/DL (ref 0–200)
CO2 SERPL-SCNC: 28.5 MMOL/L (ref 22–29)
CREAT SERPL-MCNC: 1.04 MG/DL (ref 0.76–1.27)
DEPRECATED RDW RBC AUTO: 41.7 FL (ref 37–54)
EGFRCR SERPLBLD CKD-EPI 2021: 75.8 ML/MIN/1.73
EOSINOPHIL # BLD AUTO: 0.12 10*3/MM3 (ref 0–0.4)
EOSINOPHIL NFR BLD AUTO: 3.8 % (ref 0.3–6.2)
ERYTHROCYTE [DISTWIDTH] IN BLOOD BY AUTOMATED COUNT: 13 % (ref 12.3–15.4)
FERRITIN SERPL-MCNC: 239 NG/ML (ref 30–400)
GLOBULIN UR ELPH-MCNC: 2.5 GM/DL
GLUCOSE SERPL-MCNC: 94 MG/DL (ref 65–99)
HCT VFR BLD AUTO: 35.9 % (ref 37.5–51)
HDLC SERPL-MCNC: 51 MG/DL (ref 40–60)
HGB BLD-MCNC: 12.2 G/DL (ref 13–17.7)
IMM GRANULOCYTES # BLD AUTO: 0.01 10*3/MM3 (ref 0–0.05)
IMM GRANULOCYTES NFR BLD AUTO: 0.3 % (ref 0–0.5)
IRON 24H UR-MRATE: 68 MCG/DL (ref 59–158)
IRON SATN MFR SERPL: 24 % (ref 20–50)
LDLC SERPL CALC-MCNC: 113 MG/DL (ref 0–100)
LDLC/HDLC SERPL: 2.18 {RATIO}
LYMPHOCYTES # BLD AUTO: 0.95 10*3/MM3 (ref 0.7–3.1)
LYMPHOCYTES NFR BLD AUTO: 30.3 % (ref 19.6–45.3)
MCH RBC QN AUTO: 30.2 PG (ref 26.6–33)
MCHC RBC AUTO-ENTMCNC: 34 G/DL (ref 31.5–35.7)
MCV RBC AUTO: 88.9 FL (ref 79–97)
MONOCYTES # BLD AUTO: 0.38 10*3/MM3 (ref 0.1–0.9)
MONOCYTES NFR BLD AUTO: 12.1 % (ref 5–12)
NEUTROPHILS NFR BLD AUTO: 1.64 10*3/MM3 (ref 1.7–7)
NEUTROPHILS NFR BLD AUTO: 52.2 % (ref 42.7–76)
NRBC BLD AUTO-RTO: 0 /100 WBC (ref 0–0.2)
PLATELET # BLD AUTO: 237 10*3/MM3 (ref 140–450)
PMV BLD AUTO: 9.5 FL (ref 6–12)
POTASSIUM SERPL-SCNC: 3.6 MMOL/L (ref 3.5–5.2)
PROT SERPL-MCNC: 6.1 G/DL (ref 6–8.5)
RBC # BLD AUTO: 4.04 10*6/MM3 (ref 4.14–5.8)
SODIUM SERPL-SCNC: 144 MMOL/L (ref 136–145)
T4 FREE SERPL-MCNC: 0.96 NG/DL (ref 0.93–1.7)
TIBC SERPL-MCNC: 283 MCG/DL (ref 298–536)
TRANSFERRIN SERPL-MCNC: 190 MG/DL (ref 200–360)
TRIGL SERPL-MCNC: 104 MG/DL (ref 0–150)
TSH SERPL DL<=0.05 MIU/L-ACNC: 2.25 UIU/ML (ref 0.27–4.2)
VIT B12 BLD-MCNC: 506 PG/ML (ref 211–946)
VLDLC SERPL-MCNC: 19 MG/DL (ref 5–40)
WBC NRBC COR # BLD: 3.14 10*3/MM3 (ref 3.4–10.8)

## 2022-08-11 PROCEDURE — 84443 ASSAY THYROID STIM HORMONE: CPT

## 2022-08-11 PROCEDURE — 85025 COMPLETE CBC W/AUTO DIFF WBC: CPT

## 2022-08-11 PROCEDURE — 84439 ASSAY OF FREE THYROXINE: CPT

## 2022-08-11 PROCEDURE — 84466 ASSAY OF TRANSFERRIN: CPT

## 2022-08-11 PROCEDURE — 80053 COMPREHEN METABOLIC PANEL: CPT

## 2022-08-11 PROCEDURE — 82728 ASSAY OF FERRITIN: CPT

## 2022-08-11 PROCEDURE — 83540 ASSAY OF IRON: CPT

## 2022-08-11 PROCEDURE — 82607 VITAMIN B-12: CPT

## 2022-08-11 PROCEDURE — 82306 VITAMIN D 25 HYDROXY: CPT

## 2022-08-11 PROCEDURE — 80061 LIPID PANEL: CPT

## 2022-08-19 ENCOUNTER — TELEPHONE (OUTPATIENT)
Dept: FAMILY MEDICINE CLINIC | Facility: CLINIC | Age: 72
End: 2022-08-19

## 2022-08-19 NOTE — TELEPHONE ENCOUNTER
PATIENTS DAUGHTER CALLED AND STATED THAT DR ALAS HAD SAID SOMETHING ABOUT REFERRING THE PATIENT TO HEMATOLOGY. THEY WOULD LIKE TO DO THAT

## 2022-08-19 NOTE — TELEPHONE ENCOUNTER
Spoke with pt daughter to verify pt is still currently seeing hematology, Dr. Marin. Pt does still see him, not sure when his next appointment is. Daughter will call to check for next appt, will schedule if he is not currently.

## 2022-08-19 NOTE — TELEPHONE ENCOUNTER
Pt was referred to Hematology with Dr. Marin with  [facility] in [facility]. Is pt still seeing their clinic. Please verify. Thanks

## 2022-10-11 LAB
CYTOLOGIST CVX/VAG CYTO: NORMAL
PATH INTERP BLD-IMP: NORMAL

## 2022-11-10 ENCOUNTER — OFFICE VISIT (OUTPATIENT)
Dept: FAMILY MEDICINE CLINIC | Facility: CLINIC | Age: 72
End: 2022-11-10

## 2022-11-10 VITALS
DIASTOLIC BLOOD PRESSURE: 64 MMHG | OXYGEN SATURATION: 99 % | WEIGHT: 227.4 LBS | BODY MASS INDEX: 30.14 KG/M2 | SYSTOLIC BLOOD PRESSURE: 134 MMHG | HEART RATE: 76 BPM | TEMPERATURE: 98 F | HEIGHT: 73 IN

## 2022-11-10 DIAGNOSIS — D64.9 NORMOCYTIC ANEMIA: ICD-10-CM

## 2022-11-10 DIAGNOSIS — Z87.19 HISTORY OF RECTAL ULCER: ICD-10-CM

## 2022-11-10 DIAGNOSIS — K21.9 GASTROESOPHAGEAL REFLUX DISEASE, UNSPECIFIED WHETHER ESOPHAGITIS PRESENT: ICD-10-CM

## 2022-11-10 DIAGNOSIS — N18.2 STAGE 2 CHRONIC KIDNEY DISEASE: ICD-10-CM

## 2022-11-10 DIAGNOSIS — E78.2 MIXED HYPERLIPIDEMIA: ICD-10-CM

## 2022-11-10 DIAGNOSIS — E55.9 VITAMIN D DEFICIENCY: ICD-10-CM

## 2022-11-10 DIAGNOSIS — R41.3 MEMORY PROBLEM: Primary | ICD-10-CM

## 2022-11-10 DIAGNOSIS — E66.9 CLASS 1 OBESITY WITH SERIOUS COMORBIDITY AND BODY MASS INDEX (BMI) OF 30.0 TO 30.9 IN ADULT, UNSPECIFIED OBESITY TYPE: ICD-10-CM

## 2022-11-10 DIAGNOSIS — Z85.46 HISTORY OF PROSTATE CANCER: ICD-10-CM

## 2022-11-10 DIAGNOSIS — I10 ESSENTIAL HYPERTENSION: ICD-10-CM

## 2022-11-10 DIAGNOSIS — Z82.0 FAMILY HISTORY OF ALZHEIMER'S DISEASE: ICD-10-CM

## 2022-11-10 DIAGNOSIS — K92.1 BLOOD IN STOOL: ICD-10-CM

## 2022-11-10 DIAGNOSIS — D50.9 IRON DEFICIENCY ANEMIA, UNSPECIFIED IRON DEFICIENCY ANEMIA TYPE: ICD-10-CM

## 2022-11-10 PROCEDURE — 99214 OFFICE O/P EST MOD 30 MIN: CPT | Performed by: FAMILY MEDICINE

## 2022-11-10 NOTE — PATIENT INSTRUCTIONS
Get labwork fasting    Will refer to Neurology for further evaluation with memory loss    Recheck in 2 months

## 2022-11-11 ENCOUNTER — LAB (OUTPATIENT)
Dept: LAB | Facility: HOSPITAL | Age: 72
End: 2022-11-11

## 2022-11-11 DIAGNOSIS — I10 ESSENTIAL HYPERTENSION: ICD-10-CM

## 2022-11-11 DIAGNOSIS — N18.2 STAGE 2 CHRONIC KIDNEY DISEASE: ICD-10-CM

## 2022-11-11 DIAGNOSIS — E78.2 MIXED HYPERLIPIDEMIA: ICD-10-CM

## 2022-11-11 DIAGNOSIS — Z85.46 HISTORY OF PROSTATE CANCER: ICD-10-CM

## 2022-11-11 DIAGNOSIS — R41.3 MEMORY PROBLEM: ICD-10-CM

## 2022-11-11 DIAGNOSIS — E55.9 VITAMIN D DEFICIENCY: ICD-10-CM

## 2022-11-11 DIAGNOSIS — Z87.19 HISTORY OF RECTAL ULCER: ICD-10-CM

## 2022-11-11 DIAGNOSIS — K92.1 BLOOD IN STOOL: ICD-10-CM

## 2022-11-11 DIAGNOSIS — K21.9 GASTROESOPHAGEAL REFLUX DISEASE, UNSPECIFIED WHETHER ESOPHAGITIS PRESENT: ICD-10-CM

## 2022-11-11 DIAGNOSIS — D64.9 NORMOCYTIC ANEMIA: ICD-10-CM

## 2022-11-11 DIAGNOSIS — D50.9 IRON DEFICIENCY ANEMIA, UNSPECIFIED IRON DEFICIENCY ANEMIA TYPE: ICD-10-CM

## 2022-11-11 PROCEDURE — 85045 AUTOMATED RETICULOCYTE COUNT: CPT

## 2022-11-11 PROCEDURE — 84443 ASSAY THYROID STIM HORMONE: CPT

## 2022-11-11 PROCEDURE — 80061 LIPID PANEL: CPT

## 2022-11-11 PROCEDURE — 85025 COMPLETE CBC W/AUTO DIFF WBC: CPT

## 2022-11-11 PROCEDURE — 80053 COMPREHEN METABOLIC PANEL: CPT

## 2022-11-11 PROCEDURE — 82607 VITAMIN B-12: CPT

## 2022-11-12 LAB
ALBUMIN SERPL-MCNC: 3.7 G/DL (ref 3.5–5.2)
ALBUMIN/GLOB SERPL: 1.2 G/DL
ALP SERPL-CCNC: 87 U/L (ref 39–117)
ALT SERPL W P-5'-P-CCNC: 15 U/L (ref 1–41)
ANION GAP SERPL CALCULATED.3IONS-SCNC: 8.8 MMOL/L (ref 5–15)
AST SERPL-CCNC: 18 U/L (ref 1–40)
BASOPHILS # BLD AUTO: 0.04 10*3/MM3 (ref 0–0.2)
BASOPHILS NFR BLD AUTO: 1.1 % (ref 0–1.5)
BILIRUB SERPL-MCNC: 0.5 MG/DL (ref 0–1.2)
BUN SERPL-MCNC: 15 MG/DL (ref 8–23)
BUN/CREAT SERPL: 13.4 (ref 7–25)
CALCIUM SPEC-SCNC: 9.8 MG/DL (ref 8.6–10.5)
CHLORIDE SERPL-SCNC: 104 MMOL/L (ref 98–107)
CHOLEST SERPL-MCNC: 184 MG/DL (ref 0–200)
CO2 SERPL-SCNC: 29.2 MMOL/L (ref 22–29)
CREAT SERPL-MCNC: 1.12 MG/DL (ref 0.76–1.27)
DEPRECATED RDW RBC AUTO: 41.2 FL (ref 37–54)
EGFRCR SERPLBLD CKD-EPI 2021: 69.4 ML/MIN/1.73
EOSINOPHIL # BLD AUTO: 0.18 10*3/MM3 (ref 0–0.4)
EOSINOPHIL NFR BLD AUTO: 4.8 % (ref 0.3–6.2)
ERYTHROCYTE [DISTWIDTH] IN BLOOD BY AUTOMATED COUNT: 12.7 % (ref 12.3–15.4)
GLOBULIN UR ELPH-MCNC: 3.2 GM/DL
GLUCOSE SERPL-MCNC: 90 MG/DL (ref 65–99)
HCT VFR BLD AUTO: 37.5 % (ref 37.5–51)
HDLC SERPL-MCNC: 62 MG/DL (ref 40–60)
HGB BLD-MCNC: 12.7 G/DL (ref 13–17.7)
IMM GRANULOCYTES # BLD AUTO: 0 10*3/MM3 (ref 0–0.05)
IMM GRANULOCYTES NFR BLD AUTO: 0 % (ref 0–0.5)
LAB AP CASE REPORT: NORMAL
LAB AP CLINICAL INFORMATION: NORMAL
LDLC SERPL CALC-MCNC: 106 MG/DL (ref 0–100)
LDLC/HDLC SERPL: 1.69 {RATIO}
LYMPHOCYTES # BLD AUTO: 1.22 10*3/MM3 (ref 0.7–3.1)
LYMPHOCYTES NFR BLD AUTO: 32.4 % (ref 19.6–45.3)
MCH RBC QN AUTO: 30 PG (ref 26.6–33)
MCHC RBC AUTO-ENTMCNC: 33.9 G/DL (ref 31.5–35.7)
MCV RBC AUTO: 88.7 FL (ref 79–97)
MONOCYTES # BLD AUTO: 0.47 10*3/MM3 (ref 0.1–0.9)
MONOCYTES NFR BLD AUTO: 12.5 % (ref 5–12)
NEUTROPHILS NFR BLD AUTO: 1.86 10*3/MM3 (ref 1.7–7)
NEUTROPHILS NFR BLD AUTO: 49.2 % (ref 42.7–76)
NRBC BLD AUTO-RTO: 0 /100 WBC (ref 0–0.2)
PATH REPORT.FINAL DX SPEC: NORMAL
PATH REPORT.GROSS SPEC: NORMAL
PATHOLOGY REVIEW: YES
PLATELET # BLD AUTO: 254 10*3/MM3 (ref 140–450)
PMV BLD AUTO: 10 FL (ref 6–12)
POTASSIUM SERPL-SCNC: 3.3 MMOL/L (ref 3.5–5.2)
PROT SERPL-MCNC: 6.9 G/DL (ref 6–8.5)
RBC # BLD AUTO: 4.23 10*6/MM3 (ref 4.14–5.8)
RETICS # AUTO: 0.06 10*6/MM3 (ref 0.02–0.13)
RETICS/RBC NFR AUTO: 1.35 % (ref 0.7–1.9)
SODIUM SERPL-SCNC: 142 MMOL/L (ref 136–145)
TRIGL SERPL-MCNC: 86 MG/DL (ref 0–150)
TSH SERPL DL<=0.05 MIU/L-ACNC: 2.78 UIU/ML (ref 0.27–4.2)
VIT B12 BLD-MCNC: 552 PG/ML (ref 211–946)
VLDLC SERPL-MCNC: 16 MG/DL (ref 5–40)
WBC NRBC COR # BLD: 3.77 10*3/MM3 (ref 3.4–10.8)

## 2022-11-14 ENCOUNTER — TELEPHONE (OUTPATIENT)
Dept: FAMILY MEDICINE CLINIC | Facility: CLINIC | Age: 72
End: 2022-11-14

## 2022-11-14 RX ORDER — ATORVASTATIN CALCIUM 20 MG/1
20 TABLET, FILM COATED ORAL NIGHTLY
Qty: 30 TABLET | Refills: 3 | Status: SHIPPED | OUTPATIENT
Start: 2022-11-14 | End: 2023-03-16 | Stop reason: SDUPTHER

## 2022-11-14 NOTE — TELEPHONE ENCOUNTER
----- Message from Abel Hoffman MD sent at 11/13/2022  9:41 PM CST -----  Please call pt     On CMP kidney function shows slightly lower GFR from 75.8 to 69.4 .recommend hydration.      Potassium low at 3.3. recommend more potassium in diet but also start on K-DUR 20 MEQ PO BID for 7 days give 14 pills and no refills.   '  On lipid panel LDL high at 106. Recommend pt stop pravastatin and switch to lipitor 20 mg PO qhs if pt can tolerate. Give 30 pills and 3 refills. Recommend pt start on CoQ10 Over the counter to help reduce side effects    On CBC hemoglobin stable at 12.7. pt has normocytic anemia on peripheral smear. Will need to check reticulocyte count on next visit.  Monocytes are slightly elevated.  Consider Hematology referral in the future

## 2022-12-01 RX ORDER — ERGOCALCIFEROL 1.25 MG/1
50000 CAPSULE ORAL
Qty: 12 CAPSULE | Refills: 0 | Status: SHIPPED | OUTPATIENT
Start: 2022-12-01 | End: 2023-02-28 | Stop reason: SDUPTHER

## 2022-12-26 NOTE — PROGRESS NOTES
Subjective:  German Ray is a 72 y.o. male who presents for       Patient Active Problem List   Diagnosis   • Hyperlipidemia   • Impaired fasting glucose    • Vitamin D deficiency    • Depression   • Bereavement   • Anemia   • Iron deficiency anemia   • Need for hepatitis C screening test   • Palpable mass of neck   • Encounter for immunization   • Radiation proctitis   • Diverticulosis of large intestine without hemorrhage   • Lower GI bleeding   • Pruritic rash   • Lipoma of back   • Essential hypertension   • Erectile dysfunction   • Need for vaccination   • Medicare annual wellness visit, initial   • Rectal bleeding   • Change in bowel habits   • General medical examination   • Sleep disorder   • Other fatigue   • Obesity   • Prediabetes   • Encounter for screening for endocrine disorder   • Moderate episode of recurrent major depressive disorder (HCC)   • Unintentional weight loss   • Encounter for screening for malignant neoplasm of prostate    • History of prostate cancer   • Blood in stool   • Nasal congestion   • Class 1 obesity with serious comorbidity and body mass index (BMI) of 30.0 to 30.9 in adult   • Erectile dysfunction following radiation therapy   • Hematochezia   • Hypokalemia   • Overweight   • Leukopenia   • Stage 2 chronic kidney disease   • Gastroesophageal reflux disease   • History of rectal ulcer           Current Outpatient Medications:   •  vitamin D (ERGOCALCIFEROL) 1.25 MG (84194 UT) capsule capsule, Take 1 capsule by mouth Every 7 (Seven) Days., Disp: 12 capsule, Rfl: 0  •  atorvastatin (Lipitor) 20 MG tablet, Take 1 tablet by mouth Every Night., Disp: 30 tablet, Rfl: 3  •  calcium carbonate-vitamin d 600-400 MG-UNIT per tablet, Take 1 tablet by mouth Daily., Disp: 90 tablet, Rfl: 1  •  chlorthalidone (HYGROTON) 25 MG tablet, Take 1 tablet by mouth Daily., Disp: 90 tablet, Rfl: 1  •  dilTIAZem (TIAZAC) 360 MG 24 hr capsule, Take 1 capsule by mouth Daily., Disp: 90  capsule, Rfl: 1  •  doxazosin (CARDURA) 8 MG tablet, Take 1 tablet by mouth Every Night., Disp: 90 tablet, Rfl: 3  •  ferrous sulfate 325 (65 Fe) MG tablet, Take 1 tablet by mouth 3 (Three) Times a Day With Meals., Disp: 270 tablet, Rfl: 1  •  potassium chloride (K-DUR,KLOR-CON) 20 MEQ CR tablet, Take 1 tablet by mouth 3 (Three) Times a Day., Disp: 270 tablet, Rfl: 1  •  telmisartan (MICARDIS) 40 MG tablet, Take 1 tablet by mouth Daily., Disp: 90 tablet, Rfl: 1  •  Viagra 100 MG tablet, Take 1 tablet by mouth Daily As Needed for Erectile Dysfunction., Disp: 10 tablet, Rfl: 3     Pt is 71 yo male with management of being overweight, ED, HTN, major depression, HLP, history of prostate cancer with radiation therapy, iron deficiency anemia, history of rectal bleeding/ulcer, history of radiation proctitis, diverticulosis, vitamin D deficiency, prediabetes, sp appendectomy, sp cataract surgery, gastritis, CKD stage 2      8/10/22 in office visit for recheck. Pt is due for new labwork. Pt continues to followup with Radiation Oncologist for history of prostate cancer. Pt has noticed recent blood on toilet paper. He has history of radiation proctitis and rectal bleeding. He has not had recent appt with GI. His last appt with GI with DM was in 2019. He also has a history of rectal ulcer. He goes back and forth to [place].  No chest pain no dizziness. Breathing stable. No chest pain no dizziness    11/10/22 in office visit for recheck. Pt had labwork done on 8/11/22 that was stable except lipid panel   Showed LDL at 113. On CBChemoglobin wa at 12.2 with RBC at 4.04 and WBC at 3.14. HCT at 35.9  Iron profile showed stable iron and iron saturation. He is having issues with his memory for past 3-4 months ago. He has trouble remembering almost everything. He has issues with recall.  He forgets items such as his wallet and carkeys at times. His father has a history of dementia but does not know which type. He thinks he may have  had ministrokes in the past. He tries to stay active.     1/10/23 in office visit for recheck. Pt on last visit was referred to Neurology for memory loss. labwork and MRI of brain was ordered.  . Pt had labwork done on 11/11/22 that showed stable thyroid studies vitamin B12 normal retic count normal lipid panel showed LDL at 106 from 113. CMP showed potassium at 3.3 with stable kidney function with GFR at 69.4 and stable liver enzymes. CBC showed hemoglobin at 12.7. pt had peripheral smear that showed mild normocytic normochromic anemia, with mild anisopoikilocytosis and slightly monocytosis.  . Pt had labwork with Neurologist. And has MRI pending. Pt states memory is not bothering him lately. He has traveled to different places in [place], [place]. No chest pain no dizziness. Breathing stalbe            Memory Loss  This is a recurrent problem. The current episode started more than 1 month ago. The problem occurs constantly. The problem has been unchanged. Associated symptoms include arthralgias, fatigue, numbness and weakness. Pertinent negatives include no abdominal pain, anorexia, change in bowel habit, chest pain, chills, congestion, coughing, diaphoresis, fever, headaches, joint swelling, myalgias, nausea, neck pain, rash, sore throat, swollen glands, urinary symptoms, vertigo, visual change or vomiting. Nothing aggravates the symptoms. He has tried nothing for the symptoms. The treatment provided no relief.   GI Problem  The primary symptoms include fatigue, abdominal pain and arthralgias. Primary symptoms do not include fever, weight loss, nausea, vomiting, diarrhea, melena, hematemesis, jaundice, hematochezia, dysuria or myalgias. Primary symptoms comment: rectal bleeding . The onset was gradual.   The illness does not include chills. Associated medical issues do not include inflammatory bowel disease, GERD, gallstones, liver disease, alcohol abuse, PUD, gastric bypass, bowel resection, irritable bowel  syndrome, hemorrhoids or diverticulitis. Associated medical issues comments: rectal bleeding, radiation proctitis .   Chronic Kidney Disease  This is a chronic problem. The current episode started more than 1 year ago. The problem occurs constantly. The problem has been unchanged. Associated symptoms include arthralgias and fatigue. Pertinent negatives include no abdominal pain, anorexia, change in bowel habit, chest pain, chills, congestion, coughing, diaphoresis, fever, headaches, joint swelling, myalgias, nausea, neck pain, numbness, rash, sore throat, swollen glands, urinary symptoms, vertigo, visual change, vomiting or weakness. Nothing aggravates the symptoms. He has tried nothing for the symptoms. The treatment provided no relief.   Heartburn  He complains of heartburn and water brash. He reports no abdominal pain, no belching, no chest pain, no choking, no coughing, no dysphagia, no early satiety, no globus sensation, no hoarse voice, no nausea, no sore throat, no stridor, no tooth decay or no wheezing. This is a recurrent problem. The current episode started today. The problem occurs constantly. The problem has been unchanged. The heartburn is of moderate intensity. The heartburn does not wake him from sleep. The heartburn does not limit his activity. Nothing aggravates the symptoms. Associated symptoms include fatigue. He has tried nothing for the symptoms. The treatment provided no relief. Past procedures do not include an abdominal ultrasound, an EGD, esophageal manometry, esophageal pH monitoring, H. pylori antibody titer or a UGI. Past invasive treatments do not include gastroplasty, gastroplication or reflux surgery.   Hyperlipidemia  This is a chronic problem. The current episode started more than 1 year ago. The problem is controlled. Recent lipid tests were reviewed and are variable. He has no history of chronic renal disease, diabetes, hypothyroidism, liver disease, obesity or nephrotic syndrome.  The current treatment provides moderate improvement of lipids. There are no compliance problems.  Risk factors for coronary artery disease include hypertension, male sex, a sedentary lifestyle and dyslipidemia.   Male  Problem  The patient's pertinent negatives include no genital injury, genital itching, genital lesions, pelvic pain, penile discharge, penile pain, priapism, scrotal swelling or testicular pain. Primary symptoms comment: history of prostate cancer . This is a chronic problem. The current episode started more than 1 month ago. The problem occurs constantly. The problem has been unchanged. Pertinent negatives include no abdominal pain, anorexia, chest pain, chills, constipation, coughing, diarrhea, discolored urine, dysuria, fever, flank pain, frequency, headaches, hematuria, hesitancy, joint pain, joint swelling, nausea, painful intercourse, rash, shortness of breath, sore throat, urgency, urinary retention or vomiting.   Hypertension   This is a chronic problem. The current episode started more than 1 year ago. The problem is unchanged. The problem is controlled. Pertinent negatives include no anxiety, blurred vision, chest pain, headaches, malaise/fatigue, neck pain, orthopnea, palpitations, peripheral edema, PND, shortness of breath or sweats. Risk factors for coronary artery disease include male gender, obesity, dyslipidemia and sedentary lifestyle. Past treatments include angiotensin blockers, calcium channel blockers and diuretics. Current antihypertension treatment includes angiotensin blockers, calcium channel blockers and diuretics. The current treatment provides moderate improvement. There is no history of angina, kidney disease, CAD/MI, CVA, heart failure, left ventricular hypertrophy, PVD or retinopathy. There is no history of chronic renal disease, coarctation of the aorta, hyperaldosteronism, hypercortisolism, hyperparathyroidism, a hypertension causing  med, pheochromocytoma, renovascular disease, sleep apnea or a thyroid problem.   Anemia   Presents for follow-up visit. There has been no abdominal pain, anorexia, bruising/bleeding easily, confusion, fever, leg swelling, light-headedness, malaise/fatigue, pallor, palpitations, paresthesias, pica or weight loss. Signs of blood loss that are not present include hematemesis, hematochezia, melena, menorrhagia and vaginal bleeding. There is no history of chronic renal disease or heart failure. There are no compliance problems.  Compliance with medications is 0-25%.        Review of Systems  Review of Systems   Constitutional: Positive for activity change and fatigue. Negative for appetite change, chills, diaphoresis and fever.   HENT: Negative for congestion, postnasal drip, rhinorrhea, sinus pressure, sinus pain, sneezing, sore throat, trouble swallowing and voice change.    Respiratory: Positive for shortness of breath. Negative for cough, choking, chest tightness, wheezing and stridor.    Cardiovascular: Negative for chest pain.   Gastrointestinal: Positive for blood in stool. Negative for abdominal pain, anorexia, change in bowel habit, diarrhea, nausea and vomiting.   Musculoskeletal: Positive for arthralgias. Negative for joint swelling, myalgias and neck pain.   Skin: Negative for rash.   Neurological: Positive for weakness and numbness. Negative for vertigo and headaches.   Psychiatric/Behavioral: Positive for decreased concentration. Negative for behavioral problems.       Patient Active Problem List   Diagnosis   • Hyperlipidemia   • Impaired fasting glucose    • Vitamin D deficiency    • Depression   • Bereavement   • Anemia   • Iron deficiency anemia   • Need for hepatitis C screening test   • Palpable mass of neck   • Encounter for immunization   • Radiation proctitis   • Diverticulosis of large intestine without hemorrhage   • Lower GI bleeding   • Pruritic rash   • Lipoma of back   • Essential  hypertension   • Erectile dysfunction   • Need for vaccination   • Medicare annual wellness visit, initial   • Rectal bleeding   • Change in bowel habits   • General medical examination   • Sleep disorder   • Other fatigue   • Obesity   • Prediabetes   • Encounter for screening for endocrine disorder   • Moderate episode of recurrent major depressive disorder (HCC)   • Unintentional weight loss   • Encounter for screening for malignant neoplasm of prostate    • History of prostate cancer   • Blood in stool   • Nasal congestion   • Class 1 obesity with serious comorbidity and body mass index (BMI) of 30.0 to 30.9 in adult   • Erectile dysfunction following radiation therapy   • Hematochezia   • Hypokalemia   • Overweight   • Leukopenia   • Stage 2 chronic kidney disease   • Gastroesophageal reflux disease   • History of rectal ulcer     Past Surgical History:   Procedure Laterality Date   • APPENDECTOMY     • CATARACT EXTRACTION Bilateral 2013   • COLONOSCOPY      diverticulosis in hte sigmoid colon. Altered vascular mucosa in hte rectum. hemorrhoids, no specimens collected, radation proctitis   • COLONOSCOPY N/A 10/19/2017    Procedure: COLONOSCOPY;  Surgeon: Patric Wooten MD;  Location: [facility] ENDOSCOPY;  Service:      Social History     Socioeconomic History   • Marital status:    Tobacco Use   • Smoking status: Never   • Smokeless tobacco: Never   Vaping Use   • Vaping Use: Never used   Substance and Sexual Activity   • Alcohol use: Yes     Comment: 08/30/2018 - Patient reports occasional consumption of alcoholic beverages.   • Drug use: No   • Sexual activity: Defer     Family History   Problem Relation Age of Onset   • Cancer Mother    • Hypertension Brother      Lab on 11/11/2022   Component Date Value Ref Range Status   • WBC 11/11/2022 3.77  3.40 - 10.80 10*3/mm3 Final   • RBC 11/11/2022 4.23  4.14 - 5.80 10*6/mm3 Final   • Hemoglobin 11/11/2022 12.7 (L)  13.0 - 17.7 g/dL Final   • Hematocrit  11/11/2022 37.5  37.5 - 51.0 % Final   • MCV 11/11/2022 88.7  79.0 - 97.0 fL Final   • MCH 11/11/2022 30.0  26.6 - 33.0 pg Final   • MCHC 11/11/2022 33.9  31.5 - 35.7 g/dL Final   • RDW 11/11/2022 12.7  12.3 - 15.4 % Final   • RDW-SD 11/11/2022 41.2  37.0 - 54.0 fl Final   • MPV 11/11/2022 10.0  6.0 - 12.0 fL Final   • Platelets 11/11/2022 254  140 - 450 10*3/mm3 Final   • Neutrophil % 11/11/2022 49.2  42.7 - 76.0 % Final   • Lymphocyte % 11/11/2022 32.4  19.6 - 45.3 % Final   • Monocyte % 11/11/2022 12.5 (H)  5.0 - 12.0 % Final   • Eosinophil % 11/11/2022 4.8  0.3 - 6.2 % Final   • Basophil % 11/11/2022 1.1  0.0 - 1.5 % Final   • Immature Grans % 11/11/2022 0.0  0.0 - 0.5 % Final   • Neutrophils, Absolute 11/11/2022 1.86  1.70 - 7.00 10*3/mm3 Final   • Lymphocytes, Absolute 11/11/2022 1.22  0.70 - 3.10 10*3/mm3 Final   • Monocytes, Absolute 11/11/2022 0.47  0.10 - 0.90 10*3/mm3 Final   • Eosinophils, Absolute 11/11/2022 0.18  0.00 - 0.40 10*3/mm3 Final   • Basophils, Absolute 11/11/2022 0.04  0.00 - 0.20 10*3/mm3 Final   • Immature Grans, Absolute 11/11/2022 0.00  0.00 - 0.05 10*3/mm3 Final   • nRBC 11/11/2022 0.0  0.0 - 0.2 /100 WBC Final   • Glucose 11/11/2022 90  65 - 99 mg/dL Final   • BUN 11/11/2022 15  8 - 23 mg/dL Final   • Creatinine 11/11/2022 1.12  0.76 - 1.27 mg/dL Final   • Sodium 11/11/2022 142  136 - 145 mmol/L Final   • Potassium 11/11/2022 3.3 (L)  3.5 - 5.2 mmol/L Final   • Chloride 11/11/2022 104  98 - 107 mmol/L Final   • CO2 11/11/2022 29.2 (H)  22.0 - 29.0 mmol/L Final   • Calcium 11/11/2022 9.8  8.6 - 10.5 mg/dL Final   • Total Protein 11/11/2022 6.9  6.0 - 8.5 g/dL Final   • Albumin 11/11/2022 3.70  3.50 - 5.20 g/dL Final   • ALT (SGPT) 11/11/2022 15  1 - 41 U/L Final   • AST (SGOT) 11/11/2022 18  1 - 40 U/L Final   • Alkaline Phosphatase 11/11/2022 87  39 - 117 U/L Final   • Total Bilirubin 11/11/2022 0.5  0.0 - 1.2 mg/dL Final   • Globulin 11/11/2022 3.2  gm/dL Final   • A/G Ratio  11/11/2022 1.2  g/dL Final   • BUN/Creatinine Ratio 11/11/2022 13.4  7.0 - 25.0 Final   • Anion Gap 11/11/2022 8.8  5.0 - 15.0 mmol/L Final   • eGFR 11/11/2022 69.4  >60.0 mL/min/1.73 Final    National Kidney Foundation and American Society of Nephrology (ASN) Task Force recommended calculation based on the Chronic Kidney Disease Epidemiology Collaboration (CKD-EPI) equation refit without adjustment for race.   • Total Cholesterol 11/11/2022 184  0 - 200 mg/dL Final   • Triglycerides 11/11/2022 86  0 - 150 mg/dL Final   • HDL Cholesterol 11/11/2022 62 (H)  40 - 60 mg/dL Final   • LDL Cholesterol  11/11/2022 106 (H)  0 - 100 mg/dL Final   • VLDL Cholesterol 11/11/2022 16  5 - 40 mg/dL Final   • LDL/HDL Ratio 11/11/2022 1.69   Final   • Pathology Review 11/11/2022 Yes   Final   • Reticulocyte % 11/11/2022 1.35  0.70 - 1.90 % Final   • Reticulocyte Absolute 11/11/2022 0.0571  0.0200 - 0.1300 10*6/mm3 Final   • Vitamin B-12 11/11/2022 552  211 - 946 pg/mL Final   • TSH 11/11/2022 2.780  0.270 - 4.200 uIU/mL Final   • Final Diagnosis 11/11/2022    Final                    Value:This result contains rich text formatting which cannot be displayed here.   • Clinical Information 11/11/2022    Final                    Value:This result contains rich text formatting which cannot be displayed here.   • Gross Description 11/11/2022    Final                    Value:This result contains rich text formatting which cannot be displayed here.   • Case Report 11/11/2022    Final                    Value:Surgical Pathology Report                         Case: [id number]                                  Authorizing Provider:  Abel Hoffman MD        Collected:           11/11/2022 09:55 AM          Ordering Location:     [facility]   Received:            11/12/2022 03:24 AM                                 [facility]  DRAW STATION                                                    Pathologist:           Marissa Fisher MD                                                          Specimen:    Arm, Peripheral blood smear                                                               Lab on 08/11/2022   Component Date Value Ref Range Status   • WBC 08/11/2022 3.14 (L)  3.40 - 10.80 10*3/mm3 Final   • RBC 08/11/2022 4.04 (L)  4.14 - 5.80 10*6/mm3 Final   • Hemoglobin 08/11/2022 12.2 (L)  13.0 - 17.7 g/dL Final   • Hematocrit 08/11/2022 35.9 (L)  37.5 - 51.0 % Final   • MCV 08/11/2022 88.9  79.0 - 97.0 fL Final   • MCH 08/11/2022 30.2  26.6 - 33.0 pg Final   • MCHC 08/11/2022 34.0  31.5 - 35.7 g/dL Final   • RDW 08/11/2022 13.0  12.3 - 15.4 % Final   • RDW-SD 08/11/2022 41.7  37.0 - 54.0 fl Final   • MPV 08/11/2022 9.5  6.0 - 12.0 fL Final   • Platelets 08/11/2022 237  140 - 450 10*3/mm3 Final   • Neutrophil % 08/11/2022 52.2  42.7 - 76.0 % Final   • Lymphocyte % 08/11/2022 30.3  19.6 - 45.3 % Final   • Monocyte % 08/11/2022 12.1 (H)  5.0 - 12.0 % Final   • Eosinophil % 08/11/2022 3.8  0.3 - 6.2 % Final   • Basophil % 08/11/2022 1.3  0.0 - 1.5 % Final   • Immature Grans % 08/11/2022 0.3  0.0 - 0.5 % Final   • Neutrophils, Absolute 08/11/2022 1.64 (L)  1.70 - 7.00 10*3/mm3 Final   • Lymphocytes, Absolute 08/11/2022 0.95  0.70 - 3.10 10*3/mm3 Final   • Monocytes, Absolute 08/11/2022 0.38  0.10 - 0.90 10*3/mm3 Final   • Eosinophils, Absolute 08/11/2022 0.12  0.00 - 0.40 10*3/mm3 Final   • Basophils, Absolute 08/11/2022 0.04  0.00 - 0.20 10*3/mm3 Final   • Immature Grans, Absolute 08/11/2022 0.01  0.00 - 0.05 10*3/mm3 Final   • nRBC 08/11/2022 0.0  0.0 - 0.2 /100 WBC Final   • Glucose 08/11/2022 94  65 - 99 mg/dL Final   • BUN 08/11/2022 14  8 - 23 mg/dL Final   • Creatinine 08/11/2022 1.04  0.76 - 1.27 mg/dL Final   • Sodium 08/11/2022 144  136 - 145 mmol/L Final   • Potassium 08/11/2022 3.6  3.5 - 5.2 mmol/L Final   • Chloride 08/11/2022  105  98 - 107 mmol/L Final   • CO2 08/11/2022 28.5  22.0 - 29.0 mmol/L Final   • Calcium 08/11/2022 9.3  8.6 - 10.5 mg/dL Final   • Total Protein 08/11/2022 6.1  6.0 - 8.5 g/dL Final   • Albumin 08/11/2022 3.60  3.50 - 5.20 g/dL Final   • ALT (SGPT) 08/11/2022 13  1 - 41 U/L Final   • AST (SGOT) 08/11/2022 13  1 - 40 U/L Final   • Alkaline Phosphatase 08/11/2022 76  39 - 117 U/L Final   • Total Bilirubin 08/11/2022 0.4  0.0 - 1.2 mg/dL Final   • Globulin 08/11/2022 2.5  gm/dL Final   • A/G Ratio 08/11/2022 1.4  g/dL Final   • BUN/Creatinine Ratio 08/11/2022 13.5  7.0 - 25.0 Final   • Anion Gap 08/11/2022 10.5  5.0 - 15.0 mmol/L Final   • eGFR 08/11/2022 75.8  >60.0 mL/min/1.73 Final    National Kidney Foundation and American Society of Nephrology (ASN) Task Force recommended calculation based on the Chronic Kidney Disease Epidemiology Collaboration (CKD-EPI) equation refit without adjustment for race.   • Total Cholesterol 08/11/2022 183  0 - 200 mg/dL Final   • Triglycerides 08/11/2022 104  0 - 150 mg/dL Final   • HDL Cholesterol 08/11/2022 51  40 - 60 mg/dL Final   • LDL Cholesterol  08/11/2022 113 (H)  0 - 100 mg/dL Final   • VLDL Cholesterol 08/11/2022 19  5 - 40 mg/dL Final   • LDL/HDL Ratio 08/11/2022 2.18   Final   • TSH 08/11/2022 2.250  0.270 - 4.200 uIU/mL Final   • Free T4 08/11/2022 0.96  0.93 - 1.70 ng/dL Final   • 25 Hydroxy, Vitamin D 08/11/2022 64.9  30.0 - 100.0 ng/ml Final   • Vitamin B-12 08/11/2022 506  211 - 946 pg/mL Final   • Iron 08/11/2022 68  59 - 158 mcg/dL Final   • Iron Saturation 08/11/2022 24  20 - 50 % Final   • Transferrin 08/11/2022 190 (L)  200 - 360 mg/dL Final   • TIBC 08/11/2022 283 (L)  298 - 536 mcg/dL Final   • Ferritin 08/11/2022 239.00  30.00 - 400.00 ng/mL Final      No image results found.    [email or web address]  Immunization History   Administered Date(s) Administered   • COVID-19 (MODERNA) 1st, 2nd, 3rd Dose Only 02/01/2021, 02/23/2021, 02/23/2021, 03/01/2021, 03/23/2021,  03/23/2021, 11/16/2021   • Flu Vaccine Quad PF >36MO 11/15/2017   • Fluad Quad 65+ 09/16/2020   • Fluzone Quad >6mos (Multi-dose) 09/16/2020   • Influenza Split Preservative Free ID 10/20/2016   • Influenza, Unspecified 09/16/2020   • Pneumococcal Polysaccharide (PPSV23) 03/31/2017   • Tdap 03/31/2017   • influenza Split 10/20/2016       The following portions of the patient's history were reviewed and updated as appropriate: allergies, current medications, past family history, past medical history, past social history, past surgical history and problem list.        Physical Exam  /68 (BP Location: Left arm, Patient Position: Sitting, Cuff Size: Large Adult)   Pulse 66   Temp 98.3 °F (36.8 °C)   Ht 185.4 cm (73\")   Wt 103 kg (226 lb)   SpO2 99%   BMI 29.82 kg/m²     There were no vitals taken for this visit.    Physical Exam  Vitals and nursing note reviewed.   Constitutional:       Appearance: He is well-developed. He is not diaphoretic.   HENT:      Head: Normocephalic and atraumatic.      Right Ear: External ear normal.   Eyes:      Conjunctiva/sclera: Conjunctivae normal.      Pupils: Pupils are equal, round, and reactive to light.   Cardiovascular:      Rate and Rhythm: Normal rate and regular rhythm.      Heart sounds: Normal heart sounds. No murmur heard.  Pulmonary:      Effort: Pulmonary effort is normal. No respiratory distress.      Breath sounds: Normal breath sounds.   Abdominal:      General: Bowel sounds are normal. There is no distension.      Palpations: Abdomen is soft.      Tenderness: There is no abdominal tenderness.   Musculoskeletal:         General: Tenderness present. No deformity. Normal range of motion.      Cervical back: Normal range of motion and neck supple.   Skin:     General: Skin is warm.      Coloration: Skin is not pale.      Findings: No erythema or rash.   Neurological:      Mental Status: He is alert and oriented to person, place, and time.      Cranial Nerves: No  cranial nerve deficit.   Psychiatric:         Behavior: Behavior normal.         [unfilled]   Diagnosis Plan   1. Essential hypertension        2. Mixed hyperlipidemia        3. Prediabetes        4. Overweight        5. Gastroesophageal reflux disease, unspecified whether esophagitis present        6. Iron deficiency anemia, unspecified iron deficiency anemia type        7. Stage 2 chronic kidney disease        8. Other fatigue        9. Normocytic anemia        10. Memory loss                   -recommend labwork  -recommend influenza vaccination   -recommend shingles vaccination   -ED - on viagra 100 mg PO PRN.   -GERD will get last EGD and colonoscopy from Dr. Ryan. Start on pepcid 20 mg PO BID. Information provided   -hypokalemia - on potassium supplement   -memory problem -  Concerned about Dementia refereed to Neurology. Pt reports today he does not want to proceed with MRI of brain. If getting worse recommend he call us for an appt or see Neuorlogy for 2nd opinion   -CKD stage 2 - continue to monitor  -history of prostate cancer -Urology/Radiation Oncology following  in [facility] -last PSA was normal.  -fatigue/sleep disorder -  Pt referred to sleep study with Dr. Bella but did not complete sleep study. Pt was given number to reschedule sleep study  -Vitamin D deficiency - continue vitamin D pill. Recheck vitamin D levels   -hypertension -   on chlorthalidone 25  mg dialy. on micardis 40 mg PO q daily. And dilitzem 360 mg PO q daily.  on cardura 8 mg PO qhs.     -overweight   - counseled weight loss >5 minutes BMI at 29.82  -hyperlipidemia -on pravastatin.  recommend heart healthy diet   -history rectal ulcer rectal ulcer/diverticulosis/ rectal bleeding/history of radiation proctitis -  was on  mesalamine suppository 1000 mg rectal at bedtime.  Recommend high fiber diet.  pt states he noticed a little blood. He is not concerned. If getting worse he will let me know.  Would consider  referral back to GI   -depression - stopped celexa 20 mg PO q daily.   -iron deficiency anemia/normocytic aenmia  - was seeing Hematology in the past on ferrous sulfate 325 mg PO TID   -advised pt to be safe and call with questions and concerns  -advised pt to go to ER or call 911 if symptoms worrisome or severe  -advised to followup with specialist and referrals   I spent 30  minutes caring for German on this date of service. This time includes time spent by me in the following activities: preparing for the visit, reviewing tests, obtaining and/or reviewing a separately obtained history, performing a medically appropriate examination and/or evaluation, counseling and educating the patient/family/caregiver, ordering medications, tests, or procedures, referring and communicating with other health care professionals, documenting information in the medical record, independently interpreting results and communicating that information with the patient/family/caregiver and care coordination.   -recheck in 3 months         This document has been electronically signed by Abel Hoffman MD on January 5, 2023 09:18 CST

## 2023-01-10 ENCOUNTER — OFFICE VISIT (OUTPATIENT)
Dept: FAMILY MEDICINE CLINIC | Facility: CLINIC | Age: 73
End: 2023-01-10
Payer: MEDICARE

## 2023-01-10 VITALS
SYSTOLIC BLOOD PRESSURE: 118 MMHG | TEMPERATURE: 98.3 F | OXYGEN SATURATION: 99 % | HEART RATE: 66 BPM | WEIGHT: 226 LBS | DIASTOLIC BLOOD PRESSURE: 68 MMHG | HEIGHT: 73 IN | BODY MASS INDEX: 29.95 KG/M2

## 2023-01-10 DIAGNOSIS — D64.9 NORMOCYTIC ANEMIA: ICD-10-CM

## 2023-01-10 DIAGNOSIS — E87.6 HYPOKALEMIA: ICD-10-CM

## 2023-01-10 DIAGNOSIS — R53.83 OTHER FATIGUE: ICD-10-CM

## 2023-01-10 DIAGNOSIS — I10 ESSENTIAL HYPERTENSION: Primary | ICD-10-CM

## 2023-01-10 DIAGNOSIS — E78.2 MIXED HYPERLIPIDEMIA: ICD-10-CM

## 2023-01-10 DIAGNOSIS — N18.2 STAGE 2 CHRONIC KIDNEY DISEASE: ICD-10-CM

## 2023-01-10 DIAGNOSIS — D50.9 IRON DEFICIENCY ANEMIA, UNSPECIFIED IRON DEFICIENCY ANEMIA TYPE: ICD-10-CM

## 2023-01-10 DIAGNOSIS — K21.9 GASTROESOPHAGEAL REFLUX DISEASE, UNSPECIFIED WHETHER ESOPHAGITIS PRESENT: ICD-10-CM

## 2023-01-10 DIAGNOSIS — R73.03 PREDIABETES: ICD-10-CM

## 2023-01-10 DIAGNOSIS — R41.3 MEMORY LOSS: ICD-10-CM

## 2023-01-10 DIAGNOSIS — E66.3 OVERWEIGHT: ICD-10-CM

## 2023-01-10 PROCEDURE — 99214 OFFICE O/P EST MOD 30 MIN: CPT | Performed by: FAMILY MEDICINE

## 2023-02-24 NOTE — PROGRESS NOTES
Subjective   German Ray is a 67 y.o. male.   Problem List  1. Essential Hypertension  2. Hyperlipidemia ASCVD risk high   3. Depression  4. Hypokalemia  5. Bereavement  6. Iron deficiency anemia  7. Vitamin D deficiency  8. H/O prostate cancer sp radiation therapy and seed therapy - sees Oncologist every 6 months.  9. Diverticulosis  10. H/O Lower GI bleeding   11. Radiation proctitis/Rectal Bleeding/Rectal ulcer   12. Hemorrhoids  13. Lipoma of upper back  14. BPH  15. Erectile Dysfunction  16. Internal/External Hemorrhoids  17. Diverticulosis of sigmoid colon     Pt is is 67 yo AAM with the above medical issues. Is here for recheck on blood pressure  Pt has history of iron deficiency anemia and is taking iron pill 325 mg PO TID.  His last hemoglobin was 11.9 .  Also has history of hypertension and is on Cardura and HCTZ 25 and micardidis-HCTZ mg PO q daily.  For hyperlipidemia pt is taking pravastatin. For depression pt is taking celexa. Stable today.  BP slightly  Elevated today but at home runs <140/90. Denies any chest pain, headaches or dizziness. Last lipid panel was normal . States he is in good spirits.  He is better emotionally since his wife passed away last year.      Pt did see Gastroenterology for blood in stool and rectal bleeding a colonoscopy and subsequent biopsy for erythema  was done that showed a rectal ulcer. Pt was started on mesalamine 1000 mg rectal qhs.  He also has diverticulosis and internal/external hemorrhoids and fiber supplemetns were recommended. Pt denies any active bleeding and is tolerating suppository medication. He still has occasional weakness and fatigue but no dizziness or syncopal episodes. He is due for recheck on lipid panel,  Thyroid studies and hga1c.  Pt also needs refill on potassium pill BID      2/14/18 - pt is here for recheck. He is due for recheck on thyroid studies, Vitamin D levels and lipid panel. Pt also has iron deficiency anemia. He had  colonoscopy last year that showed diverticulosis and internal/external hemorrhoids. He continues to take mesalamine 1000 mg supository. Pt states he is doing well although he does feel fatigued and tired during the day. He averages about 4 hours a night.   He still has episodes of grieving with loss of wife. Pt denies lonliness.. Pt also had mole on back that come off. He was referred to Dermatologist but did not make appt. Currently it is not bothering him.      5/7/18 pt is here for followup and recheck. Since last visit pt has been referred for sleep study with Dr. Bella. He also has been referred to Hematologist regarding iron deficiency anemia with Dr. Beth.  He may need iron infusion therapy. He recently saw Hematologist.  He still has fatigue. He also had sleep study but was unable to sleep.  A study was not rescheduled. He is due for recheck on lipid panel. He continues to take pravastatin.   He was on mesalamine before for rectal bleeding but he has not needed it.  He denies abdominal pain     7/27/18 pt is here for followup and recheck. He has history of hypertension and takes micardis 80 mg PO q daily along with HCTZ 50 mg daily and dilitazem  HE also has BPH and takes Cardura. He is also on celexa 20 mg PO q daily for depression and pravastatin 80 mg PO qhs for hyperlipidemia. Since last visit he his labwork showed normal vitamin D and lipid panel his BMP was normal. His weight last visit was 229 lbs and today it is 228 lbs. BP is at goal today with medications. He continues to have GI bleeding and is seeing Gastroenterology and was on meslamine He has a history of rectal bleeding and radiation proctitis. . He is taking iron pill for anemia. And his anemia has been stable. Energy is better.      Hypertension   This is a chronic problem. The current episode started more than 1 year ago. The problem is unchanged. The problem is controlled. Pertinent negatives include no anxiety, blurred vision, chest  pain, headaches, malaise/fatigue, neck pain, orthopnea, palpitations, peripheral edema, PND, shortness of breath or sweats. Risk factors for coronary artery disease include male gender, obesity, dyslipidemia and sedentary lifestyle. Past treatments include angiotensin blockers, calcium channel blockers and diuretics. Current antihypertension treatment includes angiotensin blockers, calcium channel blockers and diuretics. The current treatment provides moderate improvement. There is no history of angina, kidney disease, CAD/MI, CVA, heart failure, left ventricular hypertrophy, PVD or retinopathy. There is no history of chronic renal disease, coarctation of the aorta, hyperaldosteronism, hypercortisolism, hyperparathyroidism, a hypertension causing med, pheochromocytoma, renovascular disease, sleep apnea or a thyroid problem.   Anemia   Presents for follow-up visit. There has been no abdominal pain, anorexia, bruising/bleeding easily, confusion, fever, leg swelling, light-headedness, malaise/fatigue, pallor, palpitations, paresthesias, pica or weight loss. Signs of blood loss that are not present include hematemesis, hematochezia, melena, menorrhagia and vaginal bleeding. There is no history of chronic renal disease or heart failure. There are no compliance problems.  Compliance with medications is 0-25%.            The following portions of the patient's history were reviewed and updated as appropriate: allergies, current medications, past family history, past medical history, past social history, past surgical history and problem list.  Patient Active Problem List   Diagnosis   • Hyperlipidemia   • Impaired fasting glucose    • Vitamin D deficiency    • Depression   • Bereavement   • Anemia   • Iron deficiency anemia   • Need for hepatitis C screening test   • Palpable mass of neck   • Encounter for immunization   • Radiation proctitis   • Diverticulosis of large intestine without hemorrhage   • Lower GI bleeding    • Pruritic rash   • Lipoma of back   • Essential hypertension   • Erectile dysfunction   • Need for vaccination   • Medicare annual wellness visit, initial   • Rectal bleeding   • Change in bowel habits   • General medical examination   • Sleep disorder   • Other fatigue   • Obesity     Current Outpatient Prescriptions on File Prior to Visit   Medication Sig Dispense Refill   • Calcium Carb-Cholecalciferol (OSTEO-PORETICAL) 600-1000 MG-UNIT tablet Take 1 tablet by mouth daily.     • citalopram (CELEXA) 20 MG tablet Take 1 tablet by mouth Daily. 30 tablet 3   • diltiaZEM (TIAZAC) 360 MG 24 hr capsule Take 1 capsule by mouth Daily. 30 capsule 11   • doxazosin (CARDURA) 8 MG tablet Take 1 tablet by mouth Every Night. 30 tablet 11   • ferrous sulfate 325 (65 FE) MG EC tablet Take 1 tablet by mouth 3 (Three) Times a Day With Meals. 90 tablet 11   • hydrochlorothiazide (HYDRODIURIL) 50 MG tablet Take 1 tablet by mouth Daily. 30 tablet 11   • potassium chloride (K-TAB) 20 MEQ tablet controlled-release ER tablet Take 1 tablet by mouth 4 (Four) Times a Day. 120 tablet 11   • pravastatin (PRAVACHOL) 80 MG tablet Take 1 tablet by mouth Every Night. 30 tablet 11   • telmisartan (MICARDIS) 80 MG tablet Take 1 tablet by mouth Daily. 30 tablet 11   • VIAGRA 100 MG tablet Take 100 mg by mouth Daily As Needed.     • vitamin D (ERGOCALCIFEROL) 08338 units capsule capsule Take 1 capsule by mouth 1 (One) Time Per Week. 4 capsule 11     No current facility-administered medications on file prior to visit.        Review of Systems   Constitutional: Positive for activity change and fatigue. Negative for fever, malaise/fatigue and weight loss.   HENT: Negative.    Eyes: Negative.  Negative for blurred vision.   Respiratory: Negative.  Negative for shortness of breath.    Cardiovascular: Negative.  Negative for chest pain, palpitations, orthopnea and PND.   Gastrointestinal: Negative for abdominal pain, anorexia, hematemesis, hematochezia  "and melena.   Endocrine: Negative.    Genitourinary: Negative.  Negative for menorrhagia and vaginal bleeding.   Musculoskeletal: Negative.  Negative for neck pain.   Skin: Negative.  Negative for pallor.   Allergic/Immunologic: Negative.    Neurological: Negative.  Negative for light-headedness, headaches and paresthesias.   Hematological: Negative.  Does not bruise/bleed easily.   Psychiatric/Behavioral: Positive for sleep disturbance. Negative for confusion.       Objective      /70   Pulse 65   Temp 98.6 °F (37 °C)   Ht 185.4 cm (73\")   Wt 104 kg (228 lb 12.8 oz)   SpO2 98%   BMI 30.19 kg/m²           Chemistry        Component Value Date/Time     05/08/2018 1127    K 3.8 05/08/2018 1127     05/08/2018 1127    CO2 28.0 05/08/2018 1127    BUN 19 05/08/2018 1127    CREATININE 0.94 05/08/2018 1127        Component Value Date/Time    CALCIUM 9.4 05/08/2018 1127    ALKPHOS 90 02/15/2018 1038    AST 18 02/15/2018 1038    ALT 30 02/15/2018 1038    BILITOT 0.1 (L) 02/15/2018 1038        Lab Results   Component Value Date    WBC 2.79 (L) 02/15/2018    HGB 11.8 (L) 02/15/2018    HCT 37.4 (L) 02/15/2018    MCV 91.2 02/15/2018     02/15/2018     .lastipid  Lab Results   Component Value Date    HGBA1C 5.6 11/16/2017     Lab Results   Component Value Date    TSH 2.260 11/16/2017     Office Visit on 05/07/2018   Component Date Value Ref Range Status   • Total Cholesterol 05/08/2018 168  0 - 199 mg/dL Final   • Triglycerides 05/08/2018 63  20 - 199 mg/dL Final   • HDL Cholesterol 05/08/2018 69  60 - 200 mg/dL Final   • LDL Cholesterol  05/08/2018 76  1 - 129 mg/dL Final   • LDL/HDL Ratio 05/08/2018 1.25  0.00 - 3.55 Final   • 25 Hydroxy, Vitamin D 05/08/2018 39.6  30.0 - 100.0 ng/ml Final   • Glucose 05/08/2018 87  60 - 100 mg/dL Final   • BUN 05/08/2018 19  7 - 21 mg/dL Final   • Creatinine 05/08/2018 0.94  0.70 - 1.30 mg/dL Final   • Sodium 05/08/2018 141  137 - 145 mmol/L Final   • Potassium " 05/08/2018 3.8  3.5 - 5.1 mmol/L Final   • Chloride 05/08/2018 104  95 - 110 mmol/L Final   • CO2 05/08/2018 28.0  22.0 - 31.0 mmol/L Final   • Calcium 05/08/2018 9.4  8.4 - 10.2 mg/dL Final   • eGFR   Amer 05/08/2018 96  49 - 113 mL/min/1.73 Final   • BUN/Creatinine Ratio 05/08/2018 20.2  7.0 - 25.0 Final   • Anion Gap 05/08/2018 9.0  5.0 - 15.0 mmol/L Final       Physical Exam   Constitutional: He is oriented to person, place, and time. He appears well-developed and well-nourished. No distress.   HENT:   Head: Normocephalic and atraumatic.   Right Ear: External ear normal.   Left Ear: External ear normal.   Eyes: Pupils are equal, round, and reactive to light. Conjunctivae and EOM are normal. Right eye exhibits no discharge. Left eye exhibits no discharge. No scleral icterus.   Neck: Normal range of motion. Neck supple. No JVD present. No tracheal deviation present. No thyromegaly present.   Cardiovascular: Normal rate, regular rhythm and normal heart sounds.    Pulmonary/Chest: Effort normal. No stridor.   Abdominal: Bowel sounds are normal. He exhibits no distension and no mass. There is no tenderness. There is no rebound and no guarding. No hernia.   Musculoskeletal: Normal range of motion. He exhibits no edema, tenderness or deformity.   Lymphadenopathy:     He has no cervical adenopathy.   Neurological: He is alert and oriented to person, place, and time. He has normal reflexes. No cranial nerve deficit. Coordination normal.   Skin: Skin is warm and dry. No rash noted. He is not diaphoretic. No erythema. No pallor.   Mole on lower back has stuck on appearance.  No bleeding.  Possible sebborrheic keratosis    Psychiatric: He has a normal mood and affect. His behavior is normal.   Nursing note and vitals reviewed.      Assessment/Plan   Problems Addressed this Visit        Cardiovascular and Mediastinum    Hyperlipidemia    Essential hypertension - Primary       Digestive    Vitamin D deficiency      Obesity       Hematopoietic and Hemostatic    Anemia    Iron deficiency anemia       Other    Depression        -fatigue/sleep disorder -  Pt referred to sleep study with Dr. Bella but did not complete sleep study. Pt was given number to reschedule sleep study. He could not go to sleep..  He did not reschedule an appt   - Vitamin D deficiency - continue vitamin D pill. Recheck vitamin D levels   - Essential hypertension - continue  HCTZ  50 mg PO q daily.  Continue micardis 80 mg daily , cardura. BP at goal today continue tiazac. BP at goal today   -obesity -weight loss information provided  - hyperlipidemia -Continue pravastatin.  He is not taking aspirin   -rectal ulcer/diverticulosis - finished mesalamine suppository 1000 mg rectal at bedtime.  Recommend high fiber diet. Has followup appt with Gastroenterologist soon   -depression - pt is back on celexa 20 mg Pt declines counseling. He has a female  in [place], [place].     -Anemia - Hematologist following . Iron deficiency anemia. Continue iron pill TID. Hematology following.  Have last Hematologist office note.   May need to need iron therapy  - For mole on back.  Has stuck on appearance. Consider cryotherapy or shave biopsy in the future. Refferral to Dr. Neves for further evaluation was made but mole came off before appt.   -Prevnar 13 vaccination prescription given Pharmacy to give  -recheck in 2 months           This document has been electronically signed by Abel Hoffman MD on July 27, 2018 2:15 PM                 Review of Systems   Constitutional: Positive for activity change and fatigue. Negative for fever, malaise/fatigue and unexpected weight loss.   HENT: Negative.    Eyes: Negative.  Negative for blurred vision.   Respiratory: Negative.  Negative for shortness of breath.    Cardiovascular: Negative.  Negative for chest pain, palpitations, orthopnea and PND.   Gastrointestinal: Negative for abdominal pain, anorexia, hematemesis,  hematochezia and melena.   Endocrine: Negative.    Genitourinary: Negative.  Negative for menorrhagia and vaginal bleeding.   Musculoskeletal: Negative.  Negative for neck pain.   Skin: Negative.  Negative for pallor.   Allergic/Immunologic: Negative.    Neurological: Negative.  Negative for light-headedness, paresthesias and confusion.   Hematological: Negative.  Does not bruise/bleed easily.   Psychiatric/Behavioral: Positive for sleep disturbance.       Physical Exam   Constitutional: He is oriented to person, place, and time. He appears well-developed and well-nourished. No distress.   HENT:   Head: Normocephalic and atraumatic.   Right Ear: External ear normal.   Left Ear: External ear normal.   Eyes: Pupils are equal, round, and reactive to light. Conjunctivae and EOM are normal. Right eye exhibits no discharge. Left eye exhibits no discharge. No scleral icterus.   Neck: Normal range of motion. Neck supple. No JVD present. No tracheal deviation present. No thyromegaly present.   Cardiovascular: Normal rate, regular rhythm and normal heart sounds.    Pulmonary/Chest: Effort normal. No stridor.   Abdominal: Bowel sounds are normal. He exhibits no distension and no mass. There is no tenderness. There is no rebound and no guarding. No hernia.   Musculoskeletal: Normal range of motion. He exhibits no edema, tenderness or deformity.   Lymphadenopathy:     He has no cervical adenopathy.   Neurological: He is alert and oriented to person, place, and time. He has normal reflexes. No cranial nerve deficit. Coordination normal.   Skin: Skin is warm and dry. No rash noted. He is not diaphoretic. No erythema. No pallor.   Mole on lower back has stuck on appearance.  No bleeding.  Possible sebborrheic keratosis    Psychiatric: He has a normal mood and affect. His behavior is normal.   Nursing note and vitals reviewed.       Methotrexate Pregnancy And Lactation Text: This medication is Pregnancy Category X and is known to cause fetal harm. This medication is excreted in breast milk.

## 2023-02-28 RX ORDER — TELMISARTAN 40 MG/1
40 TABLET ORAL DAILY
Qty: 90 TABLET | Refills: 0 | Status: SHIPPED | OUTPATIENT
Start: 2023-02-28

## 2023-02-28 RX ORDER — POTASSIUM CHLORIDE 20 MEQ/1
20 TABLET, EXTENDED RELEASE ORAL 3 TIMES DAILY
Qty: 270 TABLET | Refills: 0 | Status: SHIPPED | OUTPATIENT
Start: 2023-02-28

## 2023-02-28 RX ORDER — CHLORTHALIDONE 25 MG/1
25 TABLET ORAL DAILY
Qty: 90 TABLET | Refills: 0 | Status: SHIPPED | OUTPATIENT
Start: 2023-02-28

## 2023-02-28 RX ORDER — ERGOCALCIFEROL 1.25 MG/1
50000 CAPSULE ORAL
Qty: 12 CAPSULE | Refills: 0 | Status: SHIPPED | OUTPATIENT
Start: 2023-02-28

## 2023-02-28 RX ORDER — PNV NO.95/FERROUS FUM/FOLIC AC 28MG-0.8MG
325 TABLET ORAL
Qty: 270 TABLET | Refills: 0 | Status: SHIPPED | OUTPATIENT
Start: 2023-02-28

## 2023-02-28 RX ORDER — DILTIAZEM HYDROCHLORIDE 360 MG/1
360 CAPSULE, EXTENDED RELEASE ORAL DAILY
Qty: 90 CAPSULE | Refills: 0 | Status: SHIPPED | OUTPATIENT
Start: 2023-02-28

## 2023-03-16 RX ORDER — ATORVASTATIN CALCIUM 20 MG/1
20 TABLET, FILM COATED ORAL NIGHTLY
Qty: 90 TABLET | Refills: 0 | Status: SHIPPED | OUTPATIENT
Start: 2023-03-16

## 2023-04-05 NOTE — PROGRESS NOTES
Subjective:  German Ray is a 72 y.o. male who presents for       Patient Active Problem List   Diagnosis   • Hyperlipidemia   • Impaired fasting glucose    • Vitamin D deficiency    • Depression   • Bereavement   • Anemia   • Iron deficiency anemia   • Need for hepatitis C screening test   • Palpable mass of neck   • Encounter for immunization   • Radiation proctitis   • Diverticulosis of large intestine without hemorrhage   • Lower GI bleeding   • Pruritic rash   • Lipoma of back   • Essential hypertension   • Erectile dysfunction   • Need for vaccination   • Medicare annual wellness visit, initial   • Rectal bleeding   • Change in bowel habits   • General medical examination   • Sleep disorder   • Other fatigue   • Obesity   • Prediabetes   • Encounter for screening for endocrine disorder   • Moderate episode of recurrent major depressive disorder   • Unintentional weight loss   • Encounter for screening for malignant neoplasm of prostate    • History of prostate cancer   • Blood in stool   • Nasal congestion   • Class 1 obesity with serious comorbidity and body mass index (BMI) of 30.0 to 30.9 in adult   • Erectile dysfunction following radiation therapy   • Hematochezia   • Hypokalemia   • Overweight   • Leukopenia   • Stage 2 chronic kidney disease   • Gastroesophageal reflux disease   • History of rectal ulcer   • Memory loss   • Normocytic anemia   • History of prediabetes           Current Outpatient Medications:   •  atorvastatin (Lipitor) 20 MG tablet, Take 1 tablet by mouth Every Night., Disp: 90 tablet, Rfl: 0  •  calcium carbonate-vitamin d 600-400 MG-UNIT per tablet, Take 1 tablet by mouth Daily., Disp: 90 tablet, Rfl: 1  •  chlorthalidone (HYGROTON) 25 MG tablet, Take 1 tablet by mouth Daily., Disp: 90 tablet, Rfl: 0  •  dilTIAZem (TIAZAC) 360 MG 24 hr capsule, Take 1 capsule by mouth Daily., Disp: 90 capsule, Rfl: 0  •  doxazosin (CARDURA) 8 MG tablet, Take 1 tablet by mouth Every  Night., Disp: 90 tablet, Rfl: 3  •  ferrous sulfate 325 (65 Fe) MG tablet, Take 1 tablet by mouth 3 (Three) Times a Day With Meals., Disp: 270 tablet, Rfl: 0  •  potassium chloride (K-DUR,KLOR-CON) 20 MEQ CR tablet, Take 1 tablet by mouth 3 (Three) Times a Day., Disp: 270 tablet, Rfl: 0  •  telmisartan (MICARDIS) 40 MG tablet, Take 1 tablet by mouth Daily., Disp: 90 tablet, Rfl: 0  •  Viagra 100 MG tablet, Take 1 tablet by mouth Daily As Needed for Erectile Dysfunction., Disp: 10 tablet, Rfl: 3  •  vitamin D (ERGOCALCIFEROL) 1.25 MG (16583 UT) capsule capsule, Take 1 capsule by mouth Every 7 (Seven) Days., Disp: 12 capsule, Rfl: 0  •  Diclofenac Sodium (VOLTAREN) 1 % gel gel, Apply 4 g topically to the appropriate area as directed 3 (Three) Times a Day., Disp: 350 g, Rfl: 3     Pt is 73 yo male with management of being overweight, ED, HTN, major depression, HLP, history of prostate cancer with radiation therapy, iron deficiency anemia, history of rectal bleeding/ulcer, history of radiation proctitis, diverticulosis, vitamin D deficiency, prediabetes, sp appendectomy, sp cataract surgery, gastritis, CKD stage 2     11/10/22 in office visit for recheck. Pt had labwork done on 8/11/22 that was stable except lipid panel   Showed LDL at 113. On CBChemoglobin wa at 12.2 with RBC at 4.04 and WBC at 3.14. HCT at 35.9  Iron profile showed stable iron and iron saturation. He is having issues with his memory for past 3-4 months ago. He has trouble remembering almost everything. He has issues with recall.  He forgets items such as his wallet and carkeys at times. His father has a history of dementia but does not know which type. He thinks he may have had ministrokes in the past. He tries to stay active.     1/10/23 in office visit for recheck. Pt on last visit was referred to Neurology for memory loss. labwork and MRI of brain was ordered.  . Pt had labwork done on 11/11/22 that showed stable thyroid studies vitamin B12 normal  retic count normal lipid panel showed LDL at 106 from 113. CMP showed potassium at 3.3 with stable kidney function with GFR at 69.4 and stable liver enzymes. CBC showed hemoglobin at 12.7. pt had peripheral smear that showed mild normocytic normochromic anemia, with mild anisopoikilocytosis and slightly monocytosis.  . Pt had labwork with Neurologist. And has MRI pending. Pt states memory is not bothering him lately. He has traveled to different places in [place], [place]. No chest pain no dizziness. Breathing stalbe     4/20/23 in office visit for recheck. Pt continues to take his medications for HTN, his prostate issues, ED and iron deficiency and HLP  Pt recently came from [place]. Pt reports no chest pain no dizziness.  He is thinking about moving.. he is having knee pain on right knee for past few weeks. He denies any trauma or fall. He states he has difficulty living his right leg.  Memory  Is stable.             Memory Loss  This is a recurrent problem. The current episode started more than 1 month ago. The problem occurs constantly. The problem has been unchanged. Associated symptoms include arthralgias, fatigue, numbness and weakness. Pertinent negatives include no abdominal pain, anorexia, change in bowel habit, chest pain, chills, congestion, coughing, diaphoresis, fever, headaches, joint swelling, myalgias, nausea, neck pain, rash, sore throat, swollen glands, urinary symptoms, vertigo, visual change or vomiting. Nothing aggravates the symptoms. He has tried nothing for the symptoms. The treatment provided no relief.   Knee Pain   The incident occurred more than 1 week ago. The incident occurred at home. The pain is present in the right knee. The quality of the pain is described as aching. The pain is at a severity of 4/10. The pain is moderate. Associated symptoms include a loss of motion, a loss of sensation and numbness. Pertinent negatives include no inability to bear weight or muscle weakness. The symptoms  are aggravated by movement. The treatment provided moderate relief.   GI Problem  The primary symptoms include fatigue, abdominal pain and arthralgias. Primary symptoms do not include fever, weight loss, nausea, vomiting, diarrhea, melena, hematemesis, jaundice, hematochezia, dysuria or myalgias. Primary symptoms comment: rectal bleeding . The onset was gradual.   The illness does not include chills. Associated medical issues do not include inflammatory bowel disease, GERD, gallstones, liver disease, alcohol abuse, PUD, gastric bypass, bowel resection, irritable bowel syndrome, hemorrhoids or diverticulitis. Associated medical issues comments: rectal bleeding, radiation proctitis .   Chronic Kidney Disease  This is a chronic problem. The current episode started more than 1 year ago. The problem occurs constantly. The problem has been unchanged. Associated symptoms include arthralgias and fatigue. Pertinent negatives include no abdominal pain, anorexia, change in bowel habit, chest pain, chills, congestion, coughing, diaphoresis, fever, headaches, joint swelling, myalgias, nausea, neck pain, numbness, rash, sore throat, swollen glands, urinary symptoms, vertigo, visual change, vomiting or weakness. Nothing aggravates the symptoms. He has tried nothing for the symptoms. The treatment provided no relief.   Heartburn  He complains of heartburn and water brash. He reports no abdominal pain, no belching, no chest pain, no choking, no coughing, no dysphagia, no early satiety, no globus sensation, no hoarse voice, no nausea, no sore throat, no stridor, no tooth decay or no wheezing. This is a recurrent problem. The current episode started today. The problem occurs constantly. The problem has been unchanged. The heartburn is of moderate intensity. The heartburn does not wake him from sleep. The heartburn does not limit his activity. Nothing aggravates the symptoms. Associated symptoms include fatigue. He has  tried nothing for the symptoms. The treatment provided no relief. Past procedures do not include an abdominal ultrasound, an EGD, esophageal manometry, esophageal pH monitoring, H. pylori antibody titer or a UGI. Past invasive treatments do not include gastroplasty, gastroplication or reflux surgery.   Hyperlipidemia  This is a chronic problem. The current episode started more than 1 year ago. The problem is controlled. Recent lipid tests were reviewed and are variable. He has no history of chronic renal disease, diabetes, hypothyroidism, liver disease, obesity or nephrotic syndrome. The current treatment provides moderate improvement of lipids. There are no compliance problems.  Risk factors for coronary artery disease include hypertension, male sex, a sedentary lifestyle and dyslipidemia.   Male  Problem  The patient's pertinent negatives include no genital injury, genital itching, genital lesions, pelvic pain, penile discharge, penile pain, priapism, scrotal swelling or testicular pain. Primary symptoms comment: history of prostate cancer . This is a chronic problem. The current episode started more than 1 month ago. The problem occurs constantly. The problem has been unchanged. Pertinent negatives include no abdominal pain, anorexia, chest pain, chills, constipation, coughing, diarrhea, discolored urine, dysuria, fever, flank pain, frequency, headaches, hematuria, hesitancy, joint pain, joint swelling, nausea, painful intercourse, rash, shortness of breath, sore throat, urgency, urinary retention or vomiting.   Hypertension   This is a chronic problem. The current episode started more than 1 year ago. The problem is unchanged. The problem is controlled. Pertinent negatives include no anxiety, blurred vision, chest pain, headaches, malaise/fatigue, neck pain, orthopnea, palpitations, peripheral edema, PND, shortness of breath or sweats. Risk factors for coronary artery disease include male gender, obesity,  dyslipidemia and sedentary lifestyle. Past treatments include angiotensin blockers, calcium channel blockers and diuretics. Current antihypertension treatment includes angiotensin blockers, calcium channel blockers and diuretics. The current treatment provides moderate improvement. There is no history of angina, kidney disease, CAD/MI, CVA, heart failure, left ventricular hypertrophy, PVD or retinopathy. There is no history of chronic renal disease, coarctation of the aorta, hyperaldosteronism, hypercortisolism, hyperparathyroidism, a hypertension causing med, pheochromocytoma, renovascular disease, sleep apnea or a thyroid problem.   Anemia   Presents for follow-up visit. There has been no abdominal pain, anorexia, bruising/bleeding easily, confusion, fever, leg swelling, light-headedness, malaise/fatigue, pallor, palpitations, paresthesias, pica or weight loss. Signs of blood loss that are not present include hematemesis, hematochezia, melena, menorrhagia and vaginal bleeding. There is no history of chronic renal disease or heart failure. There are no compliance problems.  Compliance with medications is 0-25%.        Review of Systems  Review of Systems   Constitutional: Positive for activity change and fatigue. Negative for appetite change, chills, diaphoresis and fever.   HENT: Negative for congestion, postnasal drip, rhinorrhea, sinus pressure, sinus pain, sneezing, sore throat, trouble swallowing and voice change.    Respiratory: Positive for shortness of breath. Negative for cough, choking, chest tightness, wheezing and stridor.    Cardiovascular: Negative for chest pain.   Gastrointestinal: Positive for blood in stool. Negative for abdominal pain, anorexia, change in bowel habit, diarrhea, nausea and vomiting.   Musculoskeletal: Positive for arthralgias. Negative for joint swelling, myalgias and neck pain.   Skin: Negative for rash.   Neurological: Positive for weakness and numbness. Negative for vertigo and  headaches.   Psychiatric/Behavioral: Positive for decreased concentration. Negative for behavioral problems.       Patient Active Problem List   Diagnosis   • Hyperlipidemia   • Impaired fasting glucose    • Vitamin D deficiency    • Depression   • Bereavement   • Anemia   • Iron deficiency anemia   • Need for hepatitis C screening test   • Palpable mass of neck   • Encounter for immunization   • Radiation proctitis   • Diverticulosis of large intestine without hemorrhage   • Lower GI bleeding   • Pruritic rash   • Lipoma of back   • Essential hypertension   • Erectile dysfunction   • Need for vaccination   • Medicare annual wellness visit, initial   • Rectal bleeding   • Change in bowel habits   • General medical examination   • Sleep disorder   • Other fatigue   • Obesity   • Prediabetes   • Encounter for screening for endocrine disorder   • Moderate episode of recurrent major depressive disorder   • Unintentional weight loss   • Encounter for screening for malignant neoplasm of prostate    • History of prostate cancer   • Blood in stool   • Nasal congestion   • Class 1 obesity with serious comorbidity and body mass index (BMI) of 30.0 to 30.9 in adult   • Erectile dysfunction following radiation therapy   • Hematochezia   • Hypokalemia   • Overweight   • Leukopenia   • Stage 2 chronic kidney disease   • Gastroesophageal reflux disease   • History of rectal ulcer   • Memory loss   • Normocytic anemia   • History of prediabetes     Past Surgical History:   Procedure Laterality Date   • APPENDECTOMY     • CATARACT EXTRACTION Bilateral 2013   • COLONOSCOPY      diverticulosis in hte sigmoid colon. Altered vascular mucosa in hte rectum. hemorrhoids, no specimens collected, radation proctitis   • COLONOSCOPY N/A 10/19/2017    Procedure: COLONOSCOPY;  Surgeon: Patric Wooten MD;  Location: [facility] ENDOSCOPY;  Service:      Social History     Socioeconomic History   • Marital status:    Tobacco Use   • Smoking  status: Never   • Smokeless tobacco: Never   Vaping Use   • Vaping Use: Never used   Substance and Sexual Activity   • Alcohol use: Yes     Comment: 08/30/2018 - Patient reports occasional consumption of alcoholic beverages.   • Drug use: No   • Sexual activity: Defer     Family History   Problem Relation Age of Onset   • Cancer Mother    • Hypertension Brother      Lab on 11/11/2022   Component Date Value Ref Range Status   • WBC 11/11/2022 3.77  3.40 - 10.80 10*3/mm3 Final   • RBC 11/11/2022 4.23  4.14 - 5.80 10*6/mm3 Final   • Hemoglobin 11/11/2022 12.7 (L)  13.0 - 17.7 g/dL Final   • Hematocrit 11/11/2022 37.5  37.5 - 51.0 % Final   • MCV 11/11/2022 88.7  79.0 - 97.0 fL Final   • MCH 11/11/2022 30.0  26.6 - 33.0 pg Final   • MCHC 11/11/2022 33.9  31.5 - 35.7 g/dL Final   • RDW 11/11/2022 12.7  12.3 - 15.4 % Final   • RDW-SD 11/11/2022 41.2  37.0 - 54.0 fl Final   • MPV 11/11/2022 10.0  6.0 - 12.0 fL Final   • Platelets 11/11/2022 254  140 - 450 10*3/mm3 Final   • Neutrophil % 11/11/2022 49.2  42.7 - 76.0 % Final   • Lymphocyte % 11/11/2022 32.4  19.6 - 45.3 % Final   • Monocyte % 11/11/2022 12.5 (H)  5.0 - 12.0 % Final   • Eosinophil % 11/11/2022 4.8  0.3 - 6.2 % Final   • Basophil % 11/11/2022 1.1  0.0 - 1.5 % Final   • Immature Grans % 11/11/2022 0.0  0.0 - 0.5 % Final   • Neutrophils, Absolute 11/11/2022 1.86  1.70 - 7.00 10*3/mm3 Final   • Lymphocytes, Absolute 11/11/2022 1.22  0.70 - 3.10 10*3/mm3 Final   • Monocytes, Absolute 11/11/2022 0.47  0.10 - 0.90 10*3/mm3 Final   • Eosinophils, Absolute 11/11/2022 0.18  0.00 - 0.40 10*3/mm3 Final   • Basophils, Absolute 11/11/2022 0.04  0.00 - 0.20 10*3/mm3 Final   • Immature Grans, Absolute 11/11/2022 0.00  0.00 - 0.05 10*3/mm3 Final   • nRBC 11/11/2022 0.0  0.0 - 0.2 /100 WBC Final   • Glucose 11/11/2022 90  65 - 99 mg/dL Final   • BUN 11/11/2022 15  8 - 23 mg/dL Final   • Creatinine 11/11/2022 1.12  0.76 - 1.27 mg/dL Final   • Sodium 11/11/2022 142  136 - 145  mmol/L Final   • Potassium 11/11/2022 3.3 (L)  3.5 - 5.2 mmol/L Final   • Chloride 11/11/2022 104  98 - 107 mmol/L Final   • CO2 11/11/2022 29.2 (H)  22.0 - 29.0 mmol/L Final   • Calcium 11/11/2022 9.8  8.6 - 10.5 mg/dL Final   • Total Protein 11/11/2022 6.9  6.0 - 8.5 g/dL Final   • Albumin 11/11/2022 3.70  3.50 - 5.20 g/dL Final   • ALT (SGPT) 11/11/2022 15  1 - 41 U/L Final   • AST (SGOT) 11/11/2022 18  1 - 40 U/L Final   • Alkaline Phosphatase 11/11/2022 87  39 - 117 U/L Final   • Total Bilirubin 11/11/2022 0.5  0.0 - 1.2 mg/dL Final   • Globulin 11/11/2022 3.2  gm/dL Final   • A/G Ratio 11/11/2022 1.2  g/dL Final   • BUN/Creatinine Ratio 11/11/2022 13.4  7.0 - 25.0 Final   • Anion Gap 11/11/2022 8.8  5.0 - 15.0 mmol/L Final   • eGFR 11/11/2022 69.4  >60.0 mL/min/1.73 Final    National Kidney Foundation and American Society of Nephrology (ASN) Task Force recommended calculation based on the Chronic Kidney Disease Epidemiology Collaboration (CKD-EPI) equation refit without adjustment for race.   • Total Cholesterol 11/11/2022 184  0 - 200 mg/dL Final   • Triglycerides 11/11/2022 86  0 - 150 mg/dL Final   • HDL Cholesterol 11/11/2022 62 (H)  40 - 60 mg/dL Final   • LDL Cholesterol  11/11/2022 106 (H)  0 - 100 mg/dL Final   • VLDL Cholesterol 11/11/2022 16  5 - 40 mg/dL Final   • LDL/HDL Ratio 11/11/2022 1.69   Final   • Pathology Review 11/11/2022 Yes   Final   • Reticulocyte % 11/11/2022 1.35  0.70 - 1.90 % Final   • Reticulocyte Absolute 11/11/2022 0.0571  0.0200 - 0.1300 10*6/mm3 Final   • Vitamin B-12 11/11/2022 552  211 - 946 pg/mL Final   • TSH 11/11/2022 2.780  0.270 - 4.200 uIU/mL Final   • Final Diagnosis 11/11/2022    Final                    Value:This result contains rich text formatting which cannot be displayed here.   • Clinical Information 11/11/2022    Final                    Value:This result contains rich text formatting which cannot be displayed here.   • Gross Description 11/11/2022    Final  "                   Value:This result contains rich text formatting which cannot be displayed here.   • Case Report 11/11/2022    Final                    Value:Surgical Pathology Report                         Case: [id number]                                  Authorizing Provider:  Abel Hoffman MD        Collected:           11/11/2022 09:55 AM          Ordering Location:     [facility]   Received:            11/12/2022 03:24 AM                                 [facility]                                                    Pathologist:           Marissa Fisher MD                                                          Specimen:    Arm, Peripheral blood smear                                                                  No image results found.    [email or web address]  Immunization History   Administered Date(s) Administered   • COVID-19 (MODERNA) 1st, 2nd, 3rd Dose Only 02/01/2021, 02/01/2021, 02/23/2021, 02/23/2021, 03/01/2021, 03/01/2021, 03/23/2021, 03/23/2021, 11/16/2021   • Flu Vaccine Quad PF >36MO 11/15/2017   • Fluad Quad 65+ 09/16/2020   • Fluzone Quad >6mos (Multi-dose) 09/16/2020, 09/16/2020   • Influenza Split Preservative Free ID 10/20/2016   • Influenza, Unspecified 09/16/2020   • Pneumococcal Polysaccharide (PPSV23) 03/31/2017   • Tdap 03/31/2017   • influenza Split 10/20/2016       The following portions of the patient's history were reviewed and updated as appropriate: allergies, current medications, past family history, past medical history, past social history, past surgical history and problem list.        Physical Exam  /60 (BP Location: Left arm, Patient Position: Sitting, Cuff Size: Adult)   Pulse 72   Temp 97.8 °F (36.6 °C)   Ht 185.4 cm (73\")   Wt 101 kg (223 lb 3.2 oz)   SpO2 95%   BMI 29.45 kg/m²     Physical Exam  Vitals and nursing note reviewed. "   Constitutional:       Appearance: He is well-developed. He is not diaphoretic.   HENT:      Head: Normocephalic and atraumatic.      Right Ear: External ear normal.   Eyes:      Conjunctiva/sclera: Conjunctivae normal.      Pupils: Pupils are equal, round, and reactive to light.   Cardiovascular:      Rate and Rhythm: Normal rate and regular rhythm.      Heart sounds: Murmur heard.   Pulmonary:      Effort: Pulmonary effort is normal. No respiratory distress.      Breath sounds: Normal breath sounds.   Abdominal:      General: Bowel sounds are normal. There is no distension.      Palpations: Abdomen is soft.      Tenderness: There is no abdominal tenderness.   Musculoskeletal:         General: Tenderness present. No deformity.      Cervical back: Normal range of motion and neck supple.      Right knee: Bony tenderness present. Decreased range of motion. Tenderness present over the medial joint line, MCL and PCL. Abnormal patellar mobility.      Instability Tests: Posterior drawer test positive.   Skin:     General: Skin is warm.      Coloration: Skin is not pale.      Findings: No erythema or rash.   Neurological:      Mental Status: He is alert and oriented to person, place, and time.      Cranial Nerves: No cranial nerve deficit.   Psychiatric:         Behavior: Behavior normal.         [unfilled]   Diagnosis Plan   1. Right knee pain, unspecified chronicity  XR Knee 3 View Right    MRI Knee Right Without Contrast      2. Essential hypertension  CBC Auto Differential    Comprehensive Metabolic Panel    Hemoglobin A1c    Lipid Panel    Iron Profile    Folate      3. Mixed hyperlipidemia  CBC Auto Differential    Comprehensive Metabolic Panel    Hemoglobin A1c    Lipid Panel    Iron Profile    Folate      4. Vitamin D deficiency   CBC Auto Differential    Comprehensive Metabolic Panel    Hemoglobin A1c    Lipid Panel    Iron Profile    Folate      5. Overweight  CBC Auto Differential    Comprehensive  Metabolic Panel    Hemoglobin A1c    Lipid Panel    Iron Profile    Folate      6. Rectal bleeding  CBC Auto Differential    Comprehensive Metabolic Panel    Hemoglobin A1c    Lipid Panel    Iron Profile    Folate      7. Stage 2 chronic kidney disease  CBC Auto Differential    Comprehensive Metabolic Panel    Hemoglobin A1c    Lipid Panel    Iron Profile    Folate      8. History of prostate cancer  CBC Auto Differential    Comprehensive Metabolic Panel    Hemoglobin A1c    Lipid Panel    Iron Profile    Folate      9. History of rectal ulcer  CBC Auto Differential    Comprehensive Metabolic Panel    Hemoglobin A1c    Lipid Panel    Iron Profile    Folate      10. Iron deficiency anemia, unspecified iron deficiency anemia type  CBC Auto Differential    Comprehensive Metabolic Panel    Hemoglobin A1c    Lipid Panel    Iron Profile    Folate      11. History of prediabetes                   -recommend labwork  -right knee pain - x-ray today. Will get MRI of knee. Concerned about possible ligament tear/strain.  Consider Orthopedic referral  -ED - on viagra 100 mg PO PRN.   -GERD will get last EGD and colonoscopy from Dr. Ryan. Start on pepcid 20 mg PO BID. Information provided   -hypokalemia - on potassium supplement   -memory problem -  Concerned about Dementia refereed to Neurology. Pt reports today he does not want to proceed with MRI of brain. If getting worse recommend he call us for an appt or see Neuorlogy for 2nd opinion   -CKD stage 2 - continue to monitor  -history of prostate cancer -Urology/Radiation Oncology following  in [facility] -last PSA was normal.  -fatigue/sleep disorder -  Pt referred to sleep study with Dr. Bella but did not complete sleep study. Pt was given number to reschedule sleep study  -Vitamin D deficiency - continue vitamin D pill. Recheck vitamin D levels   -hypertension -   on chlorthalidone 25  mg dialy. on micardis 40 mg PO q daily.on dilitzem 360 mg PO q daily.  on  cardura 8 mg PO qhs.     -overweight   - counseled weight loss >5 minutes BMI at 29.45   -hyperlipidemia -on pravastatin.  recommend heart healthy diet   -history rectal ulcer rectal ulcer/diverticulosis/ rectal bleeding/history of radiation proctitis -  was on  mesalamine suppository 1000 mg rectal at bedtime.  Recommend high fiber diet.  pt states he noticed a little blood. He is not concerned. If getting worse he will let me know.  Would consider referral back to GI   -depression - stopped celexa 20 mg PO q daily.   -iron deficiency anemia/normocytic aenmia  - was seeing Hematology in the past on ferrous sulfate 325 mg PO TID. Check iron levels   -advised pt to be safe and call with questions and concerns  -advised pt to go to ER or call 911 if symptoms worrisome or severe  -advised to followup with specialist and referrals   I spent 36 minutes caring for German on this date of service. This time includes time spent by me in the following activities: preparing for the visit, reviewing tests, obtaining and/or reviewing a separately obtained history, performing a medically appropriate examination and/or evaluation, counseling and educating the patient/family/caregiver, ordering medications, tests, or procedures, referring and communicating with other health care professionals, documenting information in the medical record, independently interpreting results and communicating that information with the patient/family/caregiver and care coordination   -recheck in 3 months         This document has been electronically signed by Abel Hoffman MD on April 20, 2023 15:55 CDT    .

## 2023-04-20 ENCOUNTER — OFFICE VISIT (OUTPATIENT)
Dept: FAMILY MEDICINE CLINIC | Facility: CLINIC | Age: 73
End: 2023-04-20
Payer: MEDICARE

## 2023-04-20 VITALS
HEIGHT: 73 IN | HEART RATE: 72 BPM | DIASTOLIC BLOOD PRESSURE: 60 MMHG | OXYGEN SATURATION: 95 % | BODY MASS INDEX: 29.58 KG/M2 | TEMPERATURE: 97.8 F | WEIGHT: 223.2 LBS | SYSTOLIC BLOOD PRESSURE: 114 MMHG

## 2023-04-20 DIAGNOSIS — E55.9 VITAMIN D DEFICIENCY: ICD-10-CM

## 2023-04-20 DIAGNOSIS — K62.5 RECTAL BLEEDING: ICD-10-CM

## 2023-04-20 DIAGNOSIS — Z85.46 HISTORY OF PROSTATE CANCER: ICD-10-CM

## 2023-04-20 DIAGNOSIS — I10 ESSENTIAL HYPERTENSION: ICD-10-CM

## 2023-04-20 DIAGNOSIS — E78.2 MIXED HYPERLIPIDEMIA: ICD-10-CM

## 2023-04-20 DIAGNOSIS — D50.9 IRON DEFICIENCY ANEMIA, UNSPECIFIED IRON DEFICIENCY ANEMIA TYPE: ICD-10-CM

## 2023-04-20 DIAGNOSIS — M25.561 RIGHT KNEE PAIN, UNSPECIFIED CHRONICITY: Primary | ICD-10-CM

## 2023-04-20 DIAGNOSIS — Z87.19 HISTORY OF RECTAL ULCER: ICD-10-CM

## 2023-04-20 DIAGNOSIS — N18.2 STAGE 2 CHRONIC KIDNEY DISEASE: ICD-10-CM

## 2023-04-20 DIAGNOSIS — Z87.898 HISTORY OF PREDIABETES: ICD-10-CM

## 2023-04-20 DIAGNOSIS — E66.3 OVERWEIGHT: ICD-10-CM

## 2023-04-20 NOTE — PATIENT INSTRUCTIONS
Get labwork fasting     Get x-ray of knee and MRI    Voltaren topical get apply to knee up to three times a day

## 2023-04-21 ENCOUNTER — LAB (OUTPATIENT)
Dept: LAB | Facility: HOSPITAL | Age: 73
End: 2023-04-21
Payer: MEDICARE

## 2023-04-21 DIAGNOSIS — N18.2 STAGE 2 CHRONIC KIDNEY DISEASE: ICD-10-CM

## 2023-04-21 DIAGNOSIS — D50.9 IRON DEFICIENCY ANEMIA, UNSPECIFIED IRON DEFICIENCY ANEMIA TYPE: ICD-10-CM

## 2023-04-21 DIAGNOSIS — I10 ESSENTIAL HYPERTENSION: ICD-10-CM

## 2023-04-21 DIAGNOSIS — E66.3 OVERWEIGHT: ICD-10-CM

## 2023-04-21 DIAGNOSIS — E55.9 VITAMIN D DEFICIENCY: ICD-10-CM

## 2023-04-21 DIAGNOSIS — Z87.19 HISTORY OF RECTAL ULCER: ICD-10-CM

## 2023-04-21 DIAGNOSIS — E78.2 MIXED HYPERLIPIDEMIA: ICD-10-CM

## 2023-04-21 DIAGNOSIS — Z85.46 HISTORY OF PROSTATE CANCER: ICD-10-CM

## 2023-04-21 DIAGNOSIS — K62.5 RECTAL BLEEDING: ICD-10-CM

## 2023-04-21 PROCEDURE — 82746 ASSAY OF FOLIC ACID SERUM: CPT

## 2023-04-21 PROCEDURE — 83036 HEMOGLOBIN GLYCOSYLATED A1C: CPT

## 2023-04-21 PROCEDURE — 80053 COMPREHEN METABOLIC PANEL: CPT

## 2023-04-21 PROCEDURE — 85025 COMPLETE CBC W/AUTO DIFF WBC: CPT

## 2023-04-21 PROCEDURE — 84466 ASSAY OF TRANSFERRIN: CPT

## 2023-04-21 PROCEDURE — 80061 LIPID PANEL: CPT

## 2023-04-21 PROCEDURE — 83540 ASSAY OF IRON: CPT

## 2023-04-22 LAB
ALBUMIN SERPL-MCNC: 3.8 G/DL (ref 3.5–5.2)
ALBUMIN/GLOB SERPL: 1.2 G/DL
ALP SERPL-CCNC: 86 U/L (ref 39–117)
ALT SERPL W P-5'-P-CCNC: 13 U/L (ref 1–41)
ANION GAP SERPL CALCULATED.3IONS-SCNC: 7.4 MMOL/L (ref 5–15)
AST SERPL-CCNC: 14 U/L (ref 1–40)
BASOPHILS # BLD AUTO: 0.06 10*3/MM3 (ref 0–0.2)
BASOPHILS NFR BLD AUTO: 1.9 % (ref 0–1.5)
BILIRUB SERPL-MCNC: 0.3 MG/DL (ref 0–1.2)
BUN SERPL-MCNC: 17 MG/DL (ref 8–23)
BUN/CREAT SERPL: 14.4 (ref 7–25)
CALCIUM SPEC-SCNC: 10.1 MG/DL (ref 8.6–10.5)
CHLORIDE SERPL-SCNC: 103 MMOL/L (ref 98–107)
CHOLEST SERPL-MCNC: 155 MG/DL (ref 0–200)
CO2 SERPL-SCNC: 29.6 MMOL/L (ref 22–29)
CREAT SERPL-MCNC: 1.18 MG/DL (ref 0.76–1.27)
DEPRECATED RDW RBC AUTO: 40.7 FL (ref 37–54)
EGFRCR SERPLBLD CKD-EPI 2021: 64.8 ML/MIN/1.73
EOSINOPHIL # BLD AUTO: 0.26 10*3/MM3 (ref 0–0.4)
EOSINOPHIL NFR BLD AUTO: 8.4 % (ref 0.3–6.2)
ERYTHROCYTE [DISTWIDTH] IN BLOOD BY AUTOMATED COUNT: 12.4 % (ref 12.3–15.4)
FOLATE SERPL-MCNC: 16.2 NG/ML (ref 4.78–24.2)
GLOBULIN UR ELPH-MCNC: 3.2 GM/DL
GLUCOSE SERPL-MCNC: 96 MG/DL (ref 65–99)
HBA1C MFR BLD: 5.5 % (ref 4.8–5.6)
HCT VFR BLD AUTO: 37.1 % (ref 37.5–51)
HDLC SERPL-MCNC: 54 MG/DL (ref 40–60)
HGB BLD-MCNC: 12.6 G/DL (ref 13–17.7)
IMM GRANULOCYTES # BLD AUTO: 0 10*3/MM3 (ref 0–0.05)
IMM GRANULOCYTES NFR BLD AUTO: 0 % (ref 0–0.5)
IRON 24H UR-MRATE: 50 MCG/DL (ref 59–158)
IRON SATN MFR SERPL: 16 % (ref 20–50)
LDLC SERPL CALC-MCNC: 89 MG/DL (ref 0–100)
LDLC/HDLC SERPL: 1.66 {RATIO}
LYMPHOCYTES # BLD AUTO: 0.92 10*3/MM3 (ref 0.7–3.1)
LYMPHOCYTES NFR BLD AUTO: 29.7 % (ref 19.6–45.3)
MCH RBC QN AUTO: 30.1 PG (ref 26.6–33)
MCHC RBC AUTO-ENTMCNC: 34 G/DL (ref 31.5–35.7)
MCV RBC AUTO: 88.5 FL (ref 79–97)
MONOCYTES # BLD AUTO: 0.44 10*3/MM3 (ref 0.1–0.9)
MONOCYTES NFR BLD AUTO: 14.2 % (ref 5–12)
NEUTROPHILS NFR BLD AUTO: 1.42 10*3/MM3 (ref 1.7–7)
NEUTROPHILS NFR BLD AUTO: 45.8 % (ref 42.7–76)
NRBC BLD AUTO-RTO: 0 /100 WBC (ref 0–0.2)
PLATELET # BLD AUTO: 255 10*3/MM3 (ref 140–450)
PMV BLD AUTO: 10 FL (ref 6–12)
POTASSIUM SERPL-SCNC: 3.4 MMOL/L (ref 3.5–5.2)
PROT SERPL-MCNC: 7 G/DL (ref 6–8.5)
RBC # BLD AUTO: 4.19 10*6/MM3 (ref 4.14–5.8)
SODIUM SERPL-SCNC: 140 MMOL/L (ref 136–145)
TIBC SERPL-MCNC: 316 MCG/DL (ref 298–536)
TRANSFERRIN SERPL-MCNC: 212 MG/DL (ref 200–360)
TRIGL SERPL-MCNC: 57 MG/DL (ref 0–150)
VLDLC SERPL-MCNC: 12 MG/DL (ref 5–40)
WBC NRBC COR # BLD: 3.1 10*3/MM3 (ref 3.4–10.8)

## 2023-04-24 ENCOUNTER — TELEPHONE (OUTPATIENT)
Dept: FAMILY MEDICINE CLINIC | Facility: CLINIC | Age: 73
End: 2023-04-24
Payer: MEDICARE

## 2023-04-24 RX ORDER — SPIRONOLACTONE 25 MG/1
25 TABLET ORAL DAILY
Qty: 30 TABLET | Refills: 3 | Status: SHIPPED | OUTPATIENT
Start: 2023-04-24

## 2023-04-24 NOTE — TELEPHONE ENCOUNTER
----- Message from Abel Hoffman MD sent at 4/22/2023 10:22 AM CDT -----  Please call pt    On CMP potassium slightly low at 3.4 recommend pt switch his chlorthalidone to aldactone 25 mg daily to help maintain potassium levels.  Give 30 pills and 3 refills. He is top chlorthalidone and monitor his Blood pressure at home daily to make sure it is stable     On iron studies iron at 50 and saturation is low. Recommend continue with iron pill    On CBC WBC is at 3.10 and hemoglobin at 12.6. pt consistently has leukopenia and anemia. Recommend he see a Hematologist/Oncologist for this. If pt agreeable will refer to Hematology at [facility]. Let me know what pt decides. In addition pt has elevation of monocytes and eosinophils.  Recommend seeing Hematology to rule out possible malignancy     Folic acid, lipid panel and hga1c normal. Pt is currently not diabetic or prediabetic    Recheck on next visit thanks

## 2023-04-26 ENCOUNTER — HOSPITAL ENCOUNTER (OUTPATIENT)
Dept: MRI IMAGING | Facility: HOSPITAL | Age: 73
Discharge: HOME | End: 2023-04-26
Payer: MEDICARE

## 2023-04-26 DIAGNOSIS — M25.561 RIGHT KNEE PAIN, UNSPECIFIED CHRONICITY: ICD-10-CM

## 2023-04-26 PROCEDURE — 73721 MRI JNT OF LWR EXTRE W/O DYE: CPT

## 2023-04-28 ENCOUNTER — TELEPHONE (OUTPATIENT)
Dept: FAMILY MEDICINE CLINIC | Facility: CLINIC | Age: 73
End: 2023-04-28
Payer: MEDICARE

## 2023-04-28 DIAGNOSIS — M25.561 RIGHT KNEE PAIN, UNSPECIFIED CHRONICITY: ICD-10-CM

## 2023-04-28 DIAGNOSIS — S83.241A TEAR OF MEDIAL MENISCUS OF RIGHT KNEE, UNSPECIFIED TEAR TYPE, UNSPECIFIED WHETHER OLD OR CURRENT TEAR, INITIAL ENCOUNTER: Primary | ICD-10-CM

## 2023-04-28 NOTE — TELEPHONE ENCOUNTER
PT CALLED STATING HE HAD A MRI ON THE RIGHT KNEE AND IS WANTING A REFERRAL TO BE SENT TO  [facility].

## 2023-05-12 ENCOUNTER — PREP FOR SURGERY (OUTPATIENT)
Dept: OTHER | Facility: HOSPITAL | Age: 73
End: 2023-05-12
Payer: MEDICARE

## 2023-05-12 ENCOUNTER — OFFICE VISIT (OUTPATIENT)
Dept: ORTHOPEDIC SURGERY | Facility: CLINIC | Age: 73
End: 2023-05-12
Payer: MEDICARE

## 2023-05-12 VITALS — BODY MASS INDEX: 28.76 KG/M2 | WEIGHT: 217 LBS | HEIGHT: 73 IN

## 2023-05-12 DIAGNOSIS — M25.561 ACUTE PAIN OF RIGHT KNEE: Primary | ICD-10-CM

## 2023-05-12 DIAGNOSIS — M25.461 EFFUSION OF RIGHT KNEE: ICD-10-CM

## 2023-05-12 DIAGNOSIS — M23.91 INTERNAL DERANGEMENT OF RIGHT KNEE: ICD-10-CM

## 2023-05-12 DIAGNOSIS — S83.241D ACUTE MEDIAL MENISCAL TEAR, RIGHT, SUBSEQUENT ENCOUNTER: ICD-10-CM

## 2023-05-12 DIAGNOSIS — S83.241A TEAR OF MEDIAL MENISCUS OF RIGHT KNEE, UNSPECIFIED TEAR TYPE, UNSPECIFIED WHETHER OLD OR CURRENT TEAR, INITIAL ENCOUNTER: ICD-10-CM

## 2023-05-12 RX ORDER — BUPIVACAINE HCL/0.9 % NACL/PF 0.1 %
2 PLASTIC BAG, INJECTION (ML) EPIDURAL ONCE
OUTPATIENT
Start: 2023-05-12 | End: 2023-05-12

## 2023-05-12 NOTE — PROGRESS NOTES
German Ray is a 72 y.o. male   Primary provider:  Abel Hoffman MD       Chief Complaint   Patient presents with   • Right Knee - Pain, Initial Evaluation       HISTORY OF PRESENT ILLNESS:  72-year-old gentleman who was otherwise feet and bowel and walks almost 3 miles every day has been noticing recent onset of pain with discomfort in the back of the knee on the right side with mechanical type of limitation to walking.  Patient noticed resting to stiffness and morning stiffness which eases as he continues to walk further.  The pain is typically on the inside of the knee on the right and extending to the back of the knee with occasional locking and giving away feeling in that knee that is limiting his ADLs and recreational lifestyle.  Conservative measures of rest, activity modification, over-the-counter pain meds did not seem to relieve his symptoms.  He was investigated with x-rays and MRI for his right knee on 04/26/2023 by his PCP that indicated a complex posterior horn medial meniscal tear in the right knee.  Pain  This is a new problem. The current episode started 1 to 4 weeks ago. The problem occurs intermittently (mild). The problem has been unchanged. Associated symptoms include numbness. Associated symptoms comments: Grinding,. The symptoms are aggravated by walking (sitting). He has tried nothing for the symptoms. The treatment provided no relief.        CONCURRENT MEDICAL HISTORY:    Past Medical History:   Diagnosis Date   • Anemia    • Depression     Major, single episode, unspecified   • Diverticular disease of colon    • Encounter for screening for diabetes mellitus    • Encounter for screening for endocrine disorder     Suspected   • Hemorrhage of anus and rectum    • Hyperlipidemia    • Hypertension    • Hypokalemia    • Primary malignant neoplasm of prostate     prostate   • Radiation proctitis    • Vitamin D deficiency        No Known Allergies      Current Outpatient  Medications:   •  atorvastatin (Lipitor) 20 MG tablet, Take 1 tablet by mouth Every Night., Disp: 90 tablet, Rfl: 0  •  calcium carbonate-vitamin d 600-400 MG-UNIT per tablet, Take 1 tablet by mouth Daily., Disp: 90 tablet, Rfl: 1  •  Diclofenac Sodium (VOLTAREN) 1 % gel gel, Apply 4 g topically to the appropriate area as directed 3 (Three) Times a Day., Disp: 350 g, Rfl: 3  •  dilTIAZem (TIAZAC) 360 MG 24 hr capsule, Take 1 capsule by mouth Daily., Disp: 90 capsule, Rfl: 0  •  doxazosin (CARDURA) 8 MG tablet, Take 1 tablet by mouth Every Night., Disp: 90 tablet, Rfl: 3  •  ferrous sulfate 325 (65 Fe) MG tablet, Take 1 tablet by mouth 3 (Three) Times a Day With Meals., Disp: 270 tablet, Rfl: 0  •  potassium chloride (K-DUR,KLOR-CON) 20 MEQ CR tablet, Take 1 tablet by mouth 3 (Three) Times a Day., Disp: 270 tablet, Rfl: 0  •  spironolactone (Aldactone) 25 MG tablet, Take 1 tablet by mouth Daily., Disp: 30 tablet, Rfl: 3  •  telmisartan (MICARDIS) 40 MG tablet, Take 1 tablet by mouth Daily., Disp: 90 tablet, Rfl: 0  •  Viagra 100 MG tablet, Take 1 tablet by mouth Daily As Needed for Erectile Dysfunction., Disp: 10 tablet, Rfl: 3  •  vitamin D (ERGOCALCIFEROL) 1.25 MG (73129 UT) capsule capsule, Take 1 capsule by mouth Every 7 (Seven) Days., Disp: 12 capsule, Rfl: 0    Past Surgical History:   Procedure Laterality Date   • APPENDECTOMY     • CATARACT EXTRACTION Bilateral 2013   • COLONOSCOPY      diverticulosis in hte sigmoid colon. Altered vascular mucosa in hte rectum. hemorrhoids, no specimens collected, radation proctitis   • COLONOSCOPY N/A 10/19/2017    Procedure: COLONOSCOPY;  Surgeon: Patric Wooten MD;  Location: [facility] ENDOSCOPY;  Service:        Family History   Problem Relation Age of Onset   • Cancer Mother    • Hypertension Brother        Social History     Socioeconomic History   • Marital status:    Tobacco Use   • Smoking status: Never   • Smokeless tobacco: Never   Vaping Use   • Vaping Use:  "Never used   Substance and Sexual Activity   • Alcohol use: Yes     Comment: 08/30/2018 - Patient reports occasional consumption of alcoholic beverages.   • Drug use: No   • Sexual activity: Defer        Review of Systems   Musculoskeletal:        Right knee pain   Neurological: Positive for numbness.   All other systems reviewed and are negative.      PHYSICAL EXAMINATION:       Ht 185.4 cm (73\")   Wt 98.4 kg (217 lb)   BMI 28.63 kg/m²     Physical Exam    GAIT:     []  Normal  []  Antalgic    Assistive device: []  None  []  Walker     []  Crutches  []  Cane     []  Wheelchair  []  Stretcher    Ortho Exam  Right knee exam:  Medial joint line tenderness with mild effusion with limitation of terminal extension and full flexion beyond 120 degrees.  Ismael's positive on the medial side.  Medial joint line discomfort worsens on stress testing with clicking and popping.  There is a palpable plica on the superolateral and superomedial aspects of the right knee.  No obvious instability noted.  No sign of DVT/compartment soft nontender.          XR Knee 3 View Right    Result Date: 4/20/2023  Narrative: HISTORY: Right knee pain. COMPARISON: None. FINDINGS: AP, lateral and sunrise views of the right knee demonstrate no fracture, dislocation or joint effusion.     Impression: No acute abnormality.    MRI Knee Right Without Contrast    Result Date: 4/26/2023  Narrative: REASON FOR EXAM: Knee pain, chronic, degenerative disease on x-ray (Age >= 5y). COMPARISON: X-ray 4/20/2023. TECHNIQUE: Routine noncontrast enhanced multiplanar, multisequence MRI of the right knee was obtained. FINDINGS: Prominent horizontal tearing of the medial meniscus extending from the posterior root into the posterior horn and through the medial meniscal body communicating with the inferior articular surface.  Peripheral extrusion of the medial meniscal body present.  Lateral meniscus intact.  Cruciate and collateral ligaments intact.  Cartilage " intact.  Small joint effusion.  No significant Baker's cyst.  Nonarticular soft tissues are grossly unremarkable.  Faint T2 signal and decreased T1 signal of the distal femoral shaft which may represent subtle enchondroma with some faint ring and arc densities on prior x-ray.     Impression: 1. Prominent horizontal tearing of the medial meniscus extending from the posterior root into the horn and through the medial meniscal body communicating with the inferior articular surface.  Peripheral extrusion of the body present. 2.  Small joint effusion.          ASSESSMENT:  72-year-old gentleman who was otherwise feet and bowel and walks almost 3 miles every day has been noticing recent onset of pain with discomfort in the back of the knee on the right side with mechanical type of limitation to walking.  Patient noticed resting to stiffness and morning stiffness which eases as he continues to walk further.  The pain is typically on the inside of the knee on the right and extending to the back of the knee with occasional locking and giving away feeling in that knee that is limiting his ADLs and recreational lifestyle.  Conservative measures of rest, activity modification, over-the-counter pain meds did not seem to relieve his symptoms.  He was investigated with x-rays and MRI for his right knee on 04/26/2023 by his PCP that indicated a complex posterior horn medial meniscal tear in the right knee.  Diagnoses and all orders for this visit:    Acute pain of right knee        Discussed with the patient the clinical and the radiologic findings for his right knee and indicated to him that he is having meniscal tear on the medial side particularly more in the body and the posterior horn as an internal derangement.  Since the conservative measures did not make any improvement and due to the structural nature of the derangement patient will be benefited by arthroscopic intervention to debride the meniscal tear and do the postop  rehab with physical therapy dedicated to the right knee to improve the functional outcome.  Patient agreed with the treatment plan and would like to proceed with the surgery.    PLAN  Patient is scheduled for right knee arthroscopic medial meniscectomy/debridement with possible osteochondral debridement and plica excision as soon as possible based on the OR schedule availability.  In the meantime, patient is advised to continue rest, activity modification, local pain gel application, over-the-counter pain meds for pain relief to his right knee.  The patient voiced understanding of the risks, benefits, and alternative forms of treatment that were discussed and the patient consents to proceed with right knee arthroscopic medial meniscectomy/debridement with possible osteochondral debridement and plica excision surgery.  All risks, benefits and alternatives were discussed.  Risks include, but not exclusive to anesthetic complications, including death, MI, CVA, infection, bleeding DVT, fracture, residual pain and need for future surgery.    This discussion was held with the patient by  Kun Roberson MD and all questions were answered.  EMR Dragon/Transciption Disclaimer: Some of this note may be an electronic transcription/translation of spoken language to printed text using the Dragon Dictation System.  Time spent of a minimum of 45 minutes including the face to face evaluation, reviewing of medical history and prior medial records, reviewing of diagnostic studies, documentation, patient education and coordination of care and surgical treatment decision.       Kun Roberson MD

## 2023-05-22 RX ORDER — ERGOCALCIFEROL 1.25 MG/1
50000 CAPSULE ORAL
Qty: 12 CAPSULE | Refills: 0 | Status: SHIPPED | OUTPATIENT
Start: 2023-05-22

## 2023-05-22 RX ORDER — SPIRONOLACTONE 25 MG/1
25 TABLET ORAL DAILY
Qty: 30 TABLET | Refills: 3 | Status: SHIPPED | OUTPATIENT
Start: 2023-05-22

## 2023-05-22 NOTE — TELEPHONE ENCOUNTER
Patient asked if he can get a 90 day supply of his Vitamin D and Spironolactone.   Call back number: [phone]

## 2023-05-30 RX ORDER — POTASSIUM CHLORIDE 20 MEQ/1
20 TABLET, EXTENDED RELEASE ORAL 3 TIMES DAILY
Qty: 270 TABLET | Refills: 0 | Status: SHIPPED | OUTPATIENT
Start: 2023-05-30

## 2023-05-30 RX ORDER — DILTIAZEM HYDROCHLORIDE 360 MG/1
360 CAPSULE, EXTENDED RELEASE ORAL DAILY
Qty: 90 CAPSULE | Refills: 0 | Status: SHIPPED | OUTPATIENT
Start: 2023-05-30

## 2023-05-30 RX ORDER — TELMISARTAN 40 MG/1
40 TABLET ORAL DAILY
Qty: 90 TABLET | Refills: 0 | Status: SHIPPED | OUTPATIENT
Start: 2023-05-30

## 2023-05-30 RX ORDER — PNV NO.95/FERROUS FUM/FOLIC AC 28MG-0.8MG
325 TABLET ORAL
Qty: 270 TABLET | Refills: 0 | Status: SHIPPED | OUTPATIENT
Start: 2023-05-30

## 2023-05-30 NOTE — TELEPHONE ENCOUNTER
Patient would like to have a 90 day supply of the medication instead of a 30 day. Would like a call back [phone]

## 2023-06-05 ENCOUNTER — OFFICE VISIT (OUTPATIENT)
Dept: FAMILY MEDICINE CLINIC | Facility: CLINIC | Age: 73
End: 2023-06-05
Payer: MEDICARE

## 2023-06-05 VITALS
SYSTOLIC BLOOD PRESSURE: 140 MMHG | DIASTOLIC BLOOD PRESSURE: 70 MMHG | HEIGHT: 73 IN | WEIGHT: 209 LBS | OXYGEN SATURATION: 99 % | BODY MASS INDEX: 27.7 KG/M2 | HEART RATE: 63 BPM | TEMPERATURE: 97.8 F

## 2023-06-05 DIAGNOSIS — K59.00 CONSTIPATION, UNSPECIFIED CONSTIPATION TYPE: Primary | ICD-10-CM

## 2023-06-05 PROCEDURE — 99213 OFFICE O/P EST LOW 20 MIN: CPT | Performed by: NURSE PRACTITIONER

## 2023-06-05 PROCEDURE — 1160F RVW MEDS BY RX/DR IN RCRD: CPT | Performed by: NURSE PRACTITIONER

## 2023-06-05 PROCEDURE — 3077F SYST BP >= 140 MM HG: CPT | Performed by: NURSE PRACTITIONER

## 2023-06-05 PROCEDURE — 1159F MED LIST DOCD IN RCRD: CPT | Performed by: NURSE PRACTITIONER

## 2023-06-05 PROCEDURE — 3078F DIAST BP <80 MM HG: CPT | Performed by: NURSE PRACTITIONER

## 2023-06-05 NOTE — PROGRESS NOTES
"Chief Complaint  Constipation (X 6 days )    Subjective          German Ray presents to [facility] [place]    History of Present Illness  [facility]      PCP: Dr. Hoffman    CC: \"constipation\"    Denies hx of chronic constipation, problem really just started in the last week with hard, ball like stools, having to strain more than normal.  Did use Miralax, MOM and enema with some relief.  Denies pain or cramping.  No n/v reported.  Wasn't sure if he could continue with the Miralax regularly.  Last colonoscopy in 2019, no polyps noted.  Hx of prostate cancer with radiation, currently not on any treatments, seen by oncology annually.      Constipation  This is a new problem. The current episode started in the past 7 days. The problem is unchanged. His stool frequency is 2 to 3 times per week. The stool is described as firm and pellet like. The patient is on a high fiber diet. He Exercises regularly (walks 2-3 miles per week). There has Been adequate water intake (admits he wasn't drinking much water prior, but has increased in the last week). Pertinent negatives include no abdominal pain, anorexia, back pain, bloating, diarrhea, difficulty urinating, fecal incontinence, fever, flatus, hematochezia, hemorrhoids, melena, nausea, rectal pain, vomiting or weight loss. Risk factors: iron supplements daily, but has taken for years without problems. Treatments tried: MOM, miralax, enema. The treatment provided mild relief.     Review of Systems   Constitutional: Negative.  Negative for fever and weight loss.   Respiratory: Negative.     Cardiovascular: Negative.    Gastrointestinal:  Positive for constipation. Negative for abdominal pain, anal bleeding, anorexia, bloating, blood in stool, diarrhea, flatus, hematochezia, hemorrhoids, melena, nausea, rectal pain and vomiting.   Genitourinary:  Negative for difficulty urinating, dysuria and flank pain. " "  Musculoskeletal:  Negative for back pain and myalgias.   Skin: Negative.    Neurological:  Negative for dizziness, light-headedness and headaches.      Objective   Vital Signs:   /70 (BP Location: Right arm, Patient Position: Sitting, Cuff Size: Adult)   Pulse 63   Temp 97.8 °F (36.6 °C) (Oral)   Ht 185.4 cm (73\")   Wt 94.8 kg (209 lb)   SpO2 99%   BMI 27.57 kg/m²       Physical Exam  Vitals and nursing note reviewed.   Constitutional:       General: He is not in acute distress.     Appearance: He is not ill-appearing.   HENT:      Head: Normocephalic and atraumatic.   Cardiovascular:      Rate and Rhythm: Normal rate and regular rhythm.   Pulmonary:      Effort: Pulmonary effort is normal. No respiratory distress.      Breath sounds: Normal breath sounds. No wheezing, rhonchi or rales.   Abdominal:      General: Bowel sounds are normal.      Palpations: Abdomen is soft.      Tenderness: There is no abdominal tenderness. There is no right CVA tenderness, left CVA tenderness, guarding or rebound.   Musculoskeletal:      Cervical back: Neck supple.   Skin:     General: Skin is warm and dry.   Neurological:      General: No focal deficit present.      Mental Status: He is alert and oriented to person, place, and time.   Psychiatric:         Mood and Affect: Mood normal.         Thought Content: Thought content normal.        Result Review :     Common labs          8/11/2022    11:17 11/11/2022    09:55 4/21/2023    09:44   Common Labs   Glucose 94  90  96    BUN 14  15  17    Creatinine 1.04  1.12  1.18    Sodium 144  142  140    Potassium 3.6  3.3  3.4    Chloride 105  104  103    Calcium 9.3  9.8  10.1    Albumin 3.60  3.70  3.8    Total Bilirubin 0.4  0.5  0.3    Alkaline Phosphatase 76  87  86    AST (SGOT) 13  18  14    ALT (SGPT) 13  15  13    WBC 3.14  3.77  3.10    Hemoglobin 12.2  12.7  12.6    Hematocrit 35.9  37.5  37.1    Platelets 237  254  255    Total Cholesterol 183  184  155  "   Triglycerides 104  86  57    HDL Cholesterol 51  62  54    LDL Cholesterol  113  106  89    Hemoglobin A1C   5.50      Data reviewed : GI studies colonoscopy 2019           Assessment and Plan    Diagnoses and all orders for this visit:    1. Constipation, unspecified constipation type (Primary)  -     XR Abdomen Flat & Upright    Continue to push water, fiber in diet  Continue with Miralax daily until large, soft BM and then start daily stool softener due to taking iron  Xrays today--will notify with results when available    See PCP or RTC if symptoms persist/worsen  See PCP for routine f/u visit and management of chronic medical conditions    This document has been electronically signed by JOSE MIGUEL Hanson on June 5, 2023 09:20 CDT,.

## 2023-06-13 NOTE — PROGRESS NOTES
Subjective:  German Ray is a 72 y.o. male who presents for       Patient Active Problem List   Diagnosis    Hyperlipidemia    Impaired fasting glucose     Vitamin D deficiency     Depression    Bereavement    Anemia    Iron deficiency anemia    Need for hepatitis C screening test    Palpable mass of neck    Encounter for immunization    Radiation proctitis    Diverticulosis of large intestine without hemorrhage    Lower GI bleeding    Pruritic rash    Lipoma of back    Essential hypertension    Erectile dysfunction    Need for vaccination    Medicare annual wellness visit, initial    Rectal bleeding    Change in bowel habits    General medical examination    Sleep disorder    Other fatigue    Obesity    Prediabetes    Encounter for screening for endocrine disorder    Moderate episode of recurrent major depressive disorder    Unintentional weight loss    Encounter for screening for malignant neoplasm of prostate     History of prostate cancer    Blood in stool    Nasal congestion    Class 1 obesity with serious comorbidity and body mass index (BMI) of 30.0 to 30.9 in adult    Erectile dysfunction following radiation therapy    Hematochezia    Hypokalemia    Overweight    Leukopenia    Stage 2 chronic kidney disease    Gastroesophageal reflux disease    History of rectal ulcer    Memory loss    Normocytic anemia    History of prediabetes    Acute pain of right knee    Acute medial meniscal tear, right, subsequent encounter    Tear of medial meniscus of right knee    Effusion of right knee    Internal derangement of right knee           Current Outpatient Medications:     atorvastatin (Lipitor) 20 MG tablet, Take 1 tablet by mouth Every Night., Disp: 90 tablet, Rfl: 0    calcium carbonate-vitamin d 600-400 MG-UNIT per tablet, Take 1 tablet by mouth Daily., Disp: 90 tablet, Rfl: 1    dilTIAZem (TIAZAC) 360 MG 24 hr capsule, Take 1 capsule by mouth Daily., Disp: 90 capsule, Rfl: 0    doxazosin (CARDURA) 8  MG tablet, Take 1 tablet by mouth Every Night., Disp: 90 tablet, Rfl: 3    ferrous sulfate 325 (65 Fe) MG tablet, Take 1 tablet by mouth 3 (Three) Times a Day With Meals., Disp: 270 tablet, Rfl: 0    potassium chloride (K-DUR,KLOR-CON) 20 MEQ CR tablet, Take 1 tablet by mouth 3 (Three) Times a Day., Disp: 270 tablet, Rfl: 0    spironolactone (Aldactone) 25 MG tablet, Take 1 tablet by mouth Daily., Disp: 30 tablet, Rfl: 3    telmisartan (MICARDIS) 40 MG tablet, Take 1 tablet by mouth Daily., Disp: 90 tablet, Rfl: 0    Viagra 100 MG tablet, Take 1 tablet by mouth Daily As Needed for Erectile Dysfunction., Disp: 10 tablet, Rfl: 3    vitamin D (ERGOCALCIFEROL) 1.25 MG (39894 UT) capsule capsule, Take 1 capsule by mouth Every 7 (Seven) Days., Disp: 12 capsule, Rfl: 0    citalopram (CeleXA) 10 MG tablet, Take 1 tablet by mouth Daily., Disp: 30 tablet, Rfl: 3     Pt is 73 yo male with management of being overweight, ED, HTN, major depression, HLP, history of prostate cancer with radiation therapy, iron deficiency anemia, history of rectal bleeding/ulcer, history of radiation proctitis, diverticulosis, vitamin D deficiency, prediabetes, sp appendectomy, sp cataract surgery, gastritis, CKD stage 2       1/10/23 in office visit for recheck. Pt on last visit was referred to Neurology for memory loss. labwork and MRI of brain was ordered.  . Pt had labwork done on 11/11/22 that showed stable thyroid studies vitamin B12 normal retic count normal lipid panel showed LDL at 106 from 113. CMP showed potassium at 3.3 with stable kidney function with GFR at 69.4 and stable liver enzymes. CBC showed hemoglobin at 12.7. pt had peripheral smear that showed mild normocytic normochromic anemia, with mild anisopoikilocytosis and slightly monocytosis.  . Pt had labwork with Neurologist. And has MRI pending. Pt states memory is not bothering him lately. He has traveled to different places in [place], [place]. No chest pain no dizziness.  Breathing stalbe     4/20/23 in office visit for recheck. Pt continues to take his medications for HTN, his prostate issues, ED and iron deficiency and HLP  Pt recently came from [place]. Pt reports no chest pain no dizziness.  He is thinking about moving.. he is having knee pain on right knee for past few weeks. He denies any trauma or fall. He states he has difficulty living his right leg.  Memory  Is stable.      7/25/23  in office visit for recheck.  Pt saw walk in clinic on 6/5/23 for constipation. X-ray was ordered and pt was advised to continue with miralax. X-ray of abdomen was normal  pt canceled surgery with Orthopedic on 6/6/23 regarding his right knee pain and tear of medial meniscus of right knee. Pt had labwork on 4/21/23 that showed .On CMP potassium slightly low at 3.4 recommend pt switch his chlorthalidone to aldactone 25 mg daily to help maintain potassium levels. On iron studies iron at 50 and saturation is low. On CBC WBC is at 3.10 and hemoglobin at 12.6. pt consistently has leukopenia and anemia. Recommend he see a Hematologist/Oncologist for this   Folic acid, lipid panel and hga1c normal. Pt is currently not diabetic or prediabetic.  He is wanting to start back on celexa  10 mg daily.  He is still having issues with memory issues. Short and long term. He saw Neurology but would like to see another Neurologist            Memory Loss  This is a recurrent problem. The current episode started more than 1 month ago. The problem occurs constantly. The problem has been unchanged. Associated symptoms include arthralgias, fatigue, numbness and weakness. Pertinent negatives include no abdominal pain, anorexia, change in bowel habit, chest pain, chills, congestion, coughing, diaphoresis, fever, headaches, joint swelling, myalgias, nausea, neck pain, rash, sore throat, swollen glands, urinary symptoms, vertigo, visual change or vomiting. Nothing aggravates the symptoms. He has tried nothing for the symptoms.  The treatment provided no relief.   Knee Pain   The incident occurred more than 1 week ago. The incident occurred at home. The pain is present in the right knee. The quality of the pain is described as aching. The pain is at a severity of 4/10. The pain is moderate. Associated symptoms include a loss of motion, a loss of sensation and numbness. Pertinent negatives include no inability to bear weight or muscle weakness. The symptoms are aggravated by movement. The treatment provided moderate relief. GI Problem  The primary symptoms include fatigue, abdominal pain and arthralgias. Primary symptoms do not include fever, weight loss, nausea, vomiting, diarrhea, melena, hematemesis, jaundice, hematochezia, dysuria or myalgias. Primary symptoms comment: rectal bleeding . The onset was gradual.   The illness does not include chills. Associated medical issues do not include inflammatory bowel disease, GERD, gallstones, liver disease, alcohol abuse, PUD, gastric bypass, bowel resection, irritable bowel syndrome, hemorrhoids or diverticulitis. Associated medical issues comments: rectal bleeding, radiation proctitis .   Chronic Kidney Disease  This is a chronic problem. The current episode started more than 1 year ago. The problem occurs constantly. The problem has been unchanged. Associated symptoms include arthralgias and fatigue. Pertinent negatives include no abdominal pain, anorexia, change in bowel habit, chest pain, chills, congestion, coughing, diaphoresis, fever, headaches, joint swelling, myalgias, nausea, neck pain, numbness, rash, sore throat, swollen glands, urinary symptoms, vertigo, visual change, vomiting or weakness. Nothing aggravates the symptoms. He has tried nothing for the symptoms. The treatment provided no relief.   Heartburn  He complains of heartburn and water brash. He reports no abdominal pain, no belching, no chest pain, no choking, no coughing, no dysphagia, no early satiety, no globus sensation,  no hoarse voice, no nausea, no sore throat, no stridor, no tooth decay or no wheezing. This is a recurrent problem. The current episode started today. The problem occurs constantly. The problem has been unchanged. The heartburn is of moderate intensity. The heartburn does not wake him from sleep. The heartburn does not limit his activity. Nothing aggravates the symptoms. Associated symptoms include fatigue. He has tried nothing for the symptoms. The treatment provided no relief. Past procedures do not include an abdominal ultrasound, an EGD, esophageal manometry, esophageal pH monitoring, H. pylori antibody titer or a UGI. Past invasive treatments do not include gastroplasty, gastroplication or reflux surgery.   Hyperlipidemia  This is a chronic problem. The current episode started more than 1 year ago. The problem is controlled. Recent lipid tests were reviewed and are variable. He has no history of chronic renal disease, diabetes, hypothyroidism, liver disease, obesity or nephrotic syndrome. The current treatment provides moderate improvement of lipids. There are no compliance problems.  Risk factors for coronary artery disease include hypertension, male sex, a sedentary lifestyle and dyslipidemia.   Male  Problem  The patient's pertinent negatives include no genital injury, genital itching, genital lesions, pelvic pain, penile discharge, penile pain, priapism, scrotal swelling or testicular pain. Primary symptoms comment: history of prostate cancer . This is a chronic problem. The current episode started more than 1 month ago. The problem occurs constantly. The problem has been unchanged. Pertinent negatives include no abdominal pain, anorexia, chest pain, chills, constipation, coughing, diarrhea, discolored urine, dysuria, fever, flank pain, frequency, headaches, hematuria, hesitancy, joint pain, joint swelling, nausea, painful intercourse, rash, shortness of breath, sore throat, urgency, urinary retention or  vomiting.   Hypertension   This is a chronic problem. The current episode started more than 1 year ago. The problem is unchanged. The problem is controlled. Pertinent negatives include no anxiety, blurred vision, chest pain, headaches, malaise/fatigue, neck pain, orthopnea, palpitations, peripheral edema, PND, shortness of breath or sweats. Risk factors for coronary artery disease include male gender, obesity, dyslipidemia and sedentary lifestyle. Past treatments include angiotensin blockers, calcium channel blockers and diuretics. Current antihypertension treatment includes angiotensin blockers, calcium channel blockers and diuretics. The current treatment provides moderate improvement. There is no history of angina, kidney disease, CAD/MI, CVA, heart failure, left ventricular hypertrophy, PVD or retinopathy. There is no history of chronic renal disease, coarctation of the aorta, hyperaldosteronism, hypercortisolism, hyperparathyroidism, a hypertension causing med, pheochromocytoma, renovascular disease, sleep apnea or a thyroid problem.   Anemia   Presents for follow-up visit. There has been no abdominal pain, anorexia, bruising/bleeding easily, confusion, fever, leg swelling, light-headedness, malaise/fatigue, pallor, palpitations, paresthesias, pica or weight loss. Signs of blood loss that are not present include hematemesis, hematochezia, melena, menorrhagia and vaginal bleeding. There is no history of chronic renal disease or heart failure. There are no compliance problems.  Compliance with medications is 0-25%.        Review of Systems  Review of Systems   Constitutional:  Positive for activity change and fatigue. Negative for appetite change, chills, diaphoresis and fever.   HENT:  Negative for congestion, postnasal drip, rhinorrhea, sinus pressure, sinus pain, sneezing, sore throat, trouble swallowing and voice change.    Respiratory:  Positive for shortness of breath. Negative for cough, choking, chest  tightness, wheezing and stridor.    Cardiovascular:  Negative for chest pain.   Gastrointestinal:  Positive for blood in stool. Negative for abdominal pain, anorexia, change in bowel habit, diarrhea, nausea and vomiting.   Musculoskeletal:  Positive for arthralgias. Negative for joint swelling, myalgias and neck pain.   Skin:  Negative for rash.   Neurological:  Positive for weakness and numbness. Negative for vertigo and headaches.   Psychiatric/Behavioral:  Positive for decreased concentration. Negative for behavioral problems.      Patient Active Problem List   Diagnosis    Hyperlipidemia    Impaired fasting glucose     Vitamin D deficiency     Depression    Bereavement    Anemia    Iron deficiency anemia    Need for hepatitis C screening test    Palpable mass of neck    Encounter for immunization    Radiation proctitis    Diverticulosis of large intestine without hemorrhage    Lower GI bleeding    Pruritic rash    Lipoma of back    Essential hypertension    Erectile dysfunction    Need for vaccination    Medicare annual wellness visit, initial    Rectal bleeding    Change in bowel habits    General medical examination    Sleep disorder    Other fatigue    Obesity    Prediabetes    Encounter for screening for endocrine disorder    Moderate episode of recurrent major depressive disorder    Unintentional weight loss    Encounter for screening for malignant neoplasm of prostate     History of prostate cancer    Blood in stool    Nasal congestion    Class 1 obesity with serious comorbidity and body mass index (BMI) of 30.0 to 30.9 in adult    Erectile dysfunction following radiation therapy    Hematochezia    Hypokalemia    Overweight    Leukopenia    Stage 2 chronic kidney disease    Gastroesophageal reflux disease    History of rectal ulcer    Memory loss    Normocytic anemia    History of prediabetes    Acute pain of right knee    Acute medial meniscal tear, right, subsequent encounter    Tear of medial meniscus  of right knee    Effusion of right knee    Internal derangement of right knee     Past Surgical History:   Procedure Laterality Date    APPENDECTOMY      CATARACT EXTRACTION Bilateral 2013    COLONOSCOPY      diverticulosis in hte sigmoid colon. Altered vascular mucosa in hte rectum. hemorrhoids, no specimens collected, radation proctitis    COLONOSCOPY N/A 10/19/2017    Procedure: COLONOSCOPY;  Surgeon: Patric Wooten MD;  Location: [facility] ENDOSCOPY;  Service:      Social History     Socioeconomic History    Marital status:    Tobacco Use    Smoking status: Never    Smokeless tobacco: Never   Vaping Use    Vaping Use: Never used   Substance and Sexual Activity    Alcohol use: Yes     Comment: 08/30/2018 - Patient reports occasional consumption of alcoholic beverages.    Drug use: No    Sexual activity: Defer     Family History   Problem Relation Age of Onset    Cancer Mother     Hypertension Brother      Lab on 04/21/2023   Component Date Value Ref Range Status    WBC 04/21/2023 3.10 (L)  3.40 - 10.80 10*3/mm3 Final    RBC 04/21/2023 4.19  4.14 - 5.80 10*6/mm3 Final    Hemoglobin 04/21/2023 12.6 (L)  13.0 - 17.7 g/dL Final    Hematocrit 04/21/2023 37.1 (L)  37.5 - 51.0 % Final    MCV 04/21/2023 88.5  79.0 - 97.0 fL Final    MCH 04/21/2023 30.1  26.6 - 33.0 pg Final    MCHC 04/21/2023 34.0  31.5 - 35.7 g/dL Final    RDW 04/21/2023 12.4  12.3 - 15.4 % Final    RDW-SD 04/21/2023 40.7  37.0 - 54.0 fl Final    MPV 04/21/2023 10.0  6.0 - 12.0 fL Final    Platelets 04/21/2023 255  140 - 450 10*3/mm3 Final    Neutrophil % 04/21/2023 45.8  42.7 - 76.0 % Final    Lymphocyte % 04/21/2023 29.7  19.6 - 45.3 % Final    Monocyte % 04/21/2023 14.2 (H)  5.0 - 12.0 % Final    Eosinophil % 04/21/2023 8.4 (H)  0.3 - 6.2 % Final    Basophil % 04/21/2023 1.9 (H)  0.0 - 1.5 % Final    Immature Grans % 04/21/2023 0.0  0.0 - 0.5 % Final    Neutrophils, Absolute 04/21/2023 1.42 (L)  1.70 - 7.00 10*3/mm3 Final    Lymphocytes,  Absolute 04/21/2023 0.92  0.70 - 3.10 10*3/mm3 Final    Monocytes, Absolute 04/21/2023 0.44  0.10 - 0.90 10*3/mm3 Final    Eosinophils, Absolute 04/21/2023 0.26  0.00 - 0.40 10*3/mm3 Final    Basophils, Absolute 04/21/2023 0.06  0.00 - 0.20 10*3/mm3 Final    Immature Grans, Absolute 04/21/2023 0.00  0.00 - 0.05 10*3/mm3 Final    nRBC 04/21/2023 0.0  0.0 - 0.2 /100 WBC Final    Glucose 04/21/2023 96  65 - 99 mg/dL Final    BUN 04/21/2023 17  8 - 23 mg/dL Final    Creatinine 04/21/2023 1.18  0.76 - 1.27 mg/dL Final    Sodium 04/21/2023 140  136 - 145 mmol/L Final    Potassium 04/21/2023 3.4 (L)  3.5 - 5.2 mmol/L Final    Chloride 04/21/2023 103  98 - 107 mmol/L Final    CO2 04/21/2023 29.6 (H)  22.0 - 29.0 mmol/L Final    Calcium 04/21/2023 10.1  8.6 - 10.5 mg/dL Final    Total Protein 04/21/2023 7.0  6.0 - 8.5 g/dL Final    Albumin 04/21/2023 3.8  3.5 - 5.2 g/dL Final    ALT (SGPT) 04/21/2023 13  1 - 41 U/L Final    AST (SGOT) 04/21/2023 14  1 - 40 U/L Final    Alkaline Phosphatase 04/21/2023 86  39 - 117 U/L Final    Total Bilirubin 04/21/2023 0.3  0.0 - 1.2 mg/dL Final    Globulin 04/21/2023 3.2  gm/dL Final    A/G Ratio 04/21/2023 1.2  g/dL Final    BUN/Creatinine Ratio 04/21/2023 14.4  7.0 - 25.0 Final    Anion Gap 04/21/2023 7.4  5.0 - 15.0 mmol/L Final    eGFR 04/21/2023 64.8  >60.0 mL/min/1.73 Final    Hemoglobin A1C 04/21/2023 5.50  4.80 - 5.60 % Final    Total Cholesterol 04/21/2023 155  0 - 200 mg/dL Final    Triglycerides 04/21/2023 57  0 - 150 mg/dL Final    HDL Cholesterol 04/21/2023 54  40 - 60 mg/dL Final    LDL Cholesterol  04/21/2023 89  0 - 100 mg/dL Final    VLDL Cholesterol 04/21/2023 12  5 - 40 mg/dL Final    LDL/HDL Ratio 04/21/2023 1.66   Final    Iron 04/21/2023 50 (L)  59 - 158 mcg/dL Final    Iron Saturation (TSAT) 04/21/2023 16 (L)  20 - 50 % Final    Transferrin 04/21/2023 212  200 - 360 mg/dL Final    TIBC 04/21/2023 316  298 - 536 mcg/dL Final    Folate 04/21/2023 16.20  4.78 - 24.20  "ng/mL Final      XR Abdomen Flat & Upright  Narrative: Two-view abdomen    HISTORY:  Constipation.    COMPARISON:  None.  Impression: Findings/impression:  Upright and supine views of the abdomen are obtained.  No abnormally dilated  bowel loops to suggest obstruction.  No free air or worrisome air-fluid levels.  Mild stool burden noted within the cecum, ascending colon and proximal  transverse colon.  Normal stool burden in the remainder of the colon.    No acute osseous abnormality.  Sclerotic changes project over the right femoral  head, which may represent osteonecrosis/AVN.    [email or web address]  Immunization History   Administered Date(s) Administered    COVID-19 (MODERNA) 1st,2nd,3rd Dose Monovalent 02/01/2021, 02/01/2021, 02/23/2021, 02/23/2021, 03/01/2021, 03/01/2021, 03/23/2021, 03/23/2021, 11/16/2021    Flu Vaccine Quad PF >36MO 11/15/2017    Fluad Quad 65+ 09/16/2020    Fluzone Quad >6mos (Multi-dose) 09/16/2020, 09/16/2020    Influenza Split Preservative Free ID 10/20/2016    Influenza, Unspecified 09/16/2020    Pneumococcal Polysaccharide (PPSV23) 03/31/2017    Tdap 03/31/2017    influenza Split 10/20/2016       The following portions of the patient's history were reviewed and updated as appropriate: allergies, current medications, past family history, past medical history, past social history, past surgical history and problem list.        Physical Exam  /74 (BP Location: Right arm, Patient Position: Sitting, Cuff Size: Adult)   Pulse 61   Temp 98.1 °F (36.7 °C) (Oral)   Ht 185.4 cm (72.99\")   Wt 93.4 kg (206 lb)   SpO2 95%   BMI 27.18 kg/m²         Physical Exam  Vitals and nursing note reviewed.   Constitutional:       Appearance: He is well-developed. He is not diaphoretic.   HENT:      Head: Normocephalic and atraumatic.      Right Ear: External ear normal.   Eyes:      Conjunctiva/sclera: Conjunctivae normal.      Pupils: Pupils are equal, round, and reactive to light.   Cardiovascular:    "   Rate and Rhythm: Normal rate and regular rhythm.      Heart sounds: Murmur heard.   Pulmonary:      Effort: Pulmonary effort is normal. No respiratory distress.      Breath sounds: Normal breath sounds.   Abdominal:      General: Bowel sounds are normal. There is no distension.      Palpations: Abdomen is soft.      Tenderness: There is no abdominal tenderness.   Musculoskeletal:         General: Tenderness present. No deformity.      Cervical back: Normal range of motion and neck supple.      Right knee: Bony tenderness present. Decreased range of motion. Tenderness present over the medial joint line, MCL and PCL. Abnormal patellar mobility.      Instability Tests: Posterior drawer test positive.   Skin:     General: Skin is warm.      Coloration: Skin is not pale.      Findings: No erythema or rash.   Neurological:      Mental Status: He is alert and oriented to person, place, and time.      Cranial Nerves: No cranial nerve deficit.   Psychiatric:         Behavior: Behavior normal.       [unfilled]   Diagnosis Plan   1. Memory loss  Ambulatory Referral to Neurology      2. Essential hypertension  CBC Auto Differential    Comprehensive Metabolic Panel    Lipid Panel    TSH Rfx On Abnormal To Free T4      3. Mixed hyperlipidemia  Lipid Panel      4. History of prediabetes        5. Vitamin D deficiency         6. Gastroesophageal reflux disease, unspecified whether esophagitis present        7. History of prostate cancer        8. History of rectal ulcer        9. Erectile dysfunction following radiation therapy        10. Stage 2 chronic kidney disease        11. Hypokalemia        12. Leukopenia, unspecified type        13. Encounter for screening for endocrine disorder  CBC Auto Differential    Comprehensive Metabolic Panel    Lipid Panel    TSH Rfx On Abnormal To Free T4      14. Iron deficiency anemia, unspecified iron deficiency anemia type        15. Moderate episode of recurrent major depressive  disorder                   -recommend labwork  -right knee pain - x-ray today. Will get MRI of knee. Concerned about possible ligament tear/strain.  Consider Orthopedic referral  -ED - on viagra 100 mg PO PRN.   -GERD will get last EGD and colonoscopy from Dr. Ryan. Start on pepcid 20 mg PO BID. Information provided   -hypokalemia - on potassium supplement on aldactone  -leukopenia - recheck CBC. Recommend Hematology referral   -memory problem -  Concerned about Dementia referred to Neurology. Pt reports today he does not want to proceed with MRI of brain. If getting worse recommend he call us for an appt or see Neuorlogy for 2nd opinion in [place]   -CKD stage 2 - continue to monitor  -history of prostate cancer -Urology/Radiation Oncology following  in [facility] -last PSA was normal.  -fatigue/sleep disorder -  Pt referred to sleep study with Dr. Bella but did not complete sleep study. Pt was given number to reschedule sleep study  -Vitamin D deficiency - continue vitamin D pill. Recheck vitamin D levels   -hypertension -   on aldactone 25  mg dialy. on micardis 40 mg PO q daily.on dilitzem 360 mg PO q daily.  on cardura 8 mg PO qhs.   advised pt to monitor blood pressure at home and bring readings   -overweight   - counseled weight loss >5 minutes BMI at 29.45   -hyperlipidemia -on pravastatin.  recommend heart healthy diet   -history rectal ulcer rectal ulcer/diverticulosis/ rectal bleeding/history of radiation proctitis -  was on  mesalamine suppository 1000 mg rectal at bedtime.  Recommend high fiber diet.  pt states he noticed a little blood. He is not concerned. If getting worse he will let me know.  Would consider referral back to GI   -depression - stopped celexa 20 mg PO q daily.  restart on celexa 10 mg PO q daily.    -iron deficiency anemia/normocytic aenmia  - was seeing Hematology in the past on ferrous sulfate 325 mg PO TID. Check iron levels   -advised pt to be safe and call  with questions and concerns  -advised pt to go to ER or call 911 if symptoms worrisome or severe  -advised to followup with specialist and referrals   I spent 33 minutes caring for German on this date of service. This time includes time spent by me in the following activities: preparing for the visit, reviewing tests, obtaining and/or reviewing a separately obtained history, performing a medically appropriate examination and/or evaluation, counseling and educating the patient/family/caregiver, ordering medications, tests, or procedures, referring and communicating with other health care professionals, documenting information in the medical record, independently interpreting results and communicating that information with the patient/family/caregiver and care coordination   -recheck in 3 months         This document has been electronically signed by Abel Hoffman MD on July 25, 2023 15:24 CDT

## 2023-06-15 RX ORDER — ATORVASTATIN CALCIUM 20 MG/1
20 TABLET, FILM COATED ORAL NIGHTLY
Qty: 90 TABLET | Refills: 0 | Status: SHIPPED | OUTPATIENT
Start: 2023-06-15

## 2023-07-25 ENCOUNTER — OFFICE VISIT (OUTPATIENT)
Dept: FAMILY MEDICINE CLINIC | Facility: CLINIC | Age: 73
End: 2023-07-25
Payer: MEDICARE

## 2023-07-25 VITALS
BODY MASS INDEX: 27.3 KG/M2 | SYSTOLIC BLOOD PRESSURE: 152 MMHG | HEART RATE: 61 BPM | TEMPERATURE: 98.1 F | WEIGHT: 206 LBS | HEIGHT: 73 IN | DIASTOLIC BLOOD PRESSURE: 74 MMHG | OXYGEN SATURATION: 95 %

## 2023-07-25 DIAGNOSIS — Z85.46 HISTORY OF PROSTATE CANCER: ICD-10-CM

## 2023-07-25 DIAGNOSIS — I10 ESSENTIAL HYPERTENSION: ICD-10-CM

## 2023-07-25 DIAGNOSIS — Z87.898 HISTORY OF PREDIABETES: ICD-10-CM

## 2023-07-25 DIAGNOSIS — D72.819 LEUKOPENIA, UNSPECIFIED TYPE: ICD-10-CM

## 2023-07-25 DIAGNOSIS — Z87.19 HISTORY OF RECTAL ULCER: ICD-10-CM

## 2023-07-25 DIAGNOSIS — N52.35 ERECTILE DYSFUNCTION FOLLOWING RADIATION THERAPY: ICD-10-CM

## 2023-07-25 DIAGNOSIS — R41.3 MEMORY LOSS: Primary | ICD-10-CM

## 2023-07-25 DIAGNOSIS — N18.2 STAGE 2 CHRONIC KIDNEY DISEASE: ICD-10-CM

## 2023-07-25 DIAGNOSIS — F33.1 MODERATE EPISODE OF RECURRENT MAJOR DEPRESSIVE DISORDER: ICD-10-CM

## 2023-07-25 DIAGNOSIS — D50.9 IRON DEFICIENCY ANEMIA, UNSPECIFIED IRON DEFICIENCY ANEMIA TYPE: ICD-10-CM

## 2023-07-25 DIAGNOSIS — Z13.29 ENCOUNTER FOR SCREENING FOR ENDOCRINE DISORDER: ICD-10-CM

## 2023-07-25 DIAGNOSIS — E55.9 VITAMIN D DEFICIENCY: ICD-10-CM

## 2023-07-25 DIAGNOSIS — E87.6 HYPOKALEMIA: ICD-10-CM

## 2023-07-25 DIAGNOSIS — K21.9 GASTROESOPHAGEAL REFLUX DISEASE, UNSPECIFIED WHETHER ESOPHAGITIS PRESENT: ICD-10-CM

## 2023-07-25 DIAGNOSIS — E78.2 MIXED HYPERLIPIDEMIA: ICD-10-CM

## 2023-07-25 PROCEDURE — 3078F DIAST BP <80 MM HG: CPT | Performed by: FAMILY MEDICINE

## 2023-07-25 PROCEDURE — 99214 OFFICE O/P EST MOD 30 MIN: CPT | Performed by: FAMILY MEDICINE

## 2023-07-25 PROCEDURE — 3077F SYST BP >= 140 MM HG: CPT | Performed by: FAMILY MEDICINE

## 2023-07-25 RX ORDER — CITALOPRAM HYDROBROMIDE 10 MG/1
10 TABLET ORAL DAILY
Qty: 30 TABLET | Refills: 3 | Status: SHIPPED | OUTPATIENT
Start: 2023-07-25

## 2023-07-25 NOTE — PATIENT INSTRUCTIONS
Get labwork before next visit fasting    Will refer to Neurology with [place] for memory issues    Monitor your blood pressure at home and bring readings  on paper morning and afternoon. Goal <140/90    Restart on celexa 10 mg daily. For depression

## 2023-07-26 ENCOUNTER — LAB (OUTPATIENT)
Dept: LAB | Facility: HOSPITAL | Age: 73
End: 2023-07-26
Payer: MEDICARE

## 2023-07-26 DIAGNOSIS — E78.2 MIXED HYPERLIPIDEMIA: ICD-10-CM

## 2023-07-26 DIAGNOSIS — I10 ESSENTIAL HYPERTENSION: ICD-10-CM

## 2023-07-26 DIAGNOSIS — Z13.29 ENCOUNTER FOR SCREENING FOR ENDOCRINE DISORDER: ICD-10-CM

## 2023-07-26 LAB
ALBUMIN SERPL-MCNC: 4.4 G/DL (ref 3.5–5.2)
ALBUMIN/GLOB SERPL: 1.7 G/DL
ALP SERPL-CCNC: 82 U/L (ref 39–117)
ALT SERPL W P-5'-P-CCNC: 9 U/L (ref 1–41)
ANION GAP SERPL CALCULATED.3IONS-SCNC: 8 MMOL/L (ref 5–15)
AST SERPL-CCNC: 12 U/L (ref 1–40)
BASOPHILS # BLD AUTO: 0.05 10*3/MM3 (ref 0–0.2)
BASOPHILS NFR BLD AUTO: 1.6 % (ref 0–1.5)
BILIRUB SERPL-MCNC: 0.5 MG/DL (ref 0–1.2)
BUN SERPL-MCNC: 10 MG/DL (ref 8–23)
BUN/CREAT SERPL: 10.3 (ref 7–25)
CALCIUM SPEC-SCNC: 9.9 MG/DL (ref 8.6–10.5)
CHLORIDE SERPL-SCNC: 106 MMOL/L (ref 98–107)
CHOLEST SERPL-MCNC: 143 MG/DL (ref 0–200)
CO2 SERPL-SCNC: 26 MMOL/L (ref 22–29)
CREAT SERPL-MCNC: 0.97 MG/DL (ref 0.76–1.27)
DEPRECATED RDW RBC AUTO: 41.5 FL (ref 37–54)
EGFRCR SERPLBLD CKD-EPI 2021: 81.9 ML/MIN/1.73
EOSINOPHIL # BLD AUTO: 0.08 10*3/MM3 (ref 0–0.4)
EOSINOPHIL NFR BLD AUTO: 2.5 % (ref 0.3–6.2)
ERYTHROCYTE [DISTWIDTH] IN BLOOD BY AUTOMATED COUNT: 12.9 % (ref 12.3–15.4)
GLOBULIN UR ELPH-MCNC: 2.6 GM/DL
GLUCOSE SERPL-MCNC: 102 MG/DL (ref 65–99)
HCT VFR BLD AUTO: 34.9 % (ref 37.5–51)
HDLC SERPL-MCNC: 59 MG/DL (ref 40–60)
HGB BLD-MCNC: 11.9 G/DL (ref 13–17.7)
IMM GRANULOCYTES # BLD AUTO: 0 10*3/MM3 (ref 0–0.05)
IMM GRANULOCYTES NFR BLD AUTO: 0 % (ref 0–0.5)
LDLC SERPL CALC-MCNC: 71 MG/DL (ref 0–100)
LDLC/HDLC SERPL: 1.2 {RATIO}
LYMPHOCYTES # BLD AUTO: 0.7 10*3/MM3 (ref 0.7–3.1)
LYMPHOCYTES NFR BLD AUTO: 21.9 % (ref 19.6–45.3)
MCH RBC QN AUTO: 30.3 PG (ref 26.6–33)
MCHC RBC AUTO-ENTMCNC: 34.1 G/DL (ref 31.5–35.7)
MCV RBC AUTO: 88.8 FL (ref 79–97)
MONOCYTES # BLD AUTO: 0.36 10*3/MM3 (ref 0.1–0.9)
MONOCYTES NFR BLD AUTO: 11.3 % (ref 5–12)
NEUTROPHILS NFR BLD AUTO: 2.01 10*3/MM3 (ref 1.7–7)
NEUTROPHILS NFR BLD AUTO: 62.7 % (ref 42.7–76)
NRBC BLD AUTO-RTO: 0 /100 WBC (ref 0–0.2)
PLATELET # BLD AUTO: 262 10*3/MM3 (ref 140–450)
PMV BLD AUTO: 10.1 FL (ref 6–12)
POTASSIUM SERPL-SCNC: 4.4 MMOL/L (ref 3.5–5.2)
PROT SERPL-MCNC: 7 G/DL (ref 6–8.5)
RBC # BLD AUTO: 3.93 10*6/MM3 (ref 4.14–5.8)
SODIUM SERPL-SCNC: 140 MMOL/L (ref 136–145)
TRIGL SERPL-MCNC: 65 MG/DL (ref 0–150)
TSH SERPL DL<=0.05 MIU/L-ACNC: 1.37 UIU/ML (ref 0.27–4.2)
VLDLC SERPL-MCNC: 13 MG/DL (ref 5–40)
WBC NRBC COR # BLD: 3.2 10*3/MM3 (ref 3.4–10.8)

## 2023-07-26 PROCEDURE — 85025 COMPLETE CBC W/AUTO DIFF WBC: CPT

## 2023-07-26 PROCEDURE — 80061 LIPID PANEL: CPT

## 2023-07-26 PROCEDURE — 84443 ASSAY THYROID STIM HORMONE: CPT

## 2023-07-26 PROCEDURE — 80053 COMPREHEN METABOLIC PANEL: CPT

## 2023-08-01 NOTE — PROGRESS NOTES
Subjective:  German Ray is a 72 y.o. male who presents for       Patient Active Problem List   Diagnosis    Hyperlipidemia    Impaired fasting glucose     Vitamin D deficiency     Depression    Bereavement    Anemia    Iron deficiency anemia    Need for hepatitis C screening test    Palpable mass of neck    Encounter for immunization    Radiation proctitis    Diverticulosis of large intestine without hemorrhage    Lower GI bleeding    Pruritic rash    Lipoma of back    Essential hypertension    Erectile dysfunction    Need for vaccination    Medicare annual wellness visit, initial    Rectal bleeding    Change in bowel habits    General medical examination    Sleep disorder    Other fatigue    Obesity    Prediabetes    Encounter for screening for endocrine disorder    Moderate episode of recurrent major depressive disorder    Unintentional weight loss    Encounter for screening for malignant neoplasm of prostate     History of prostate cancer    Blood in stool    Nasal congestion    Class 1 obesity with serious comorbidity and body mass index (BMI) of 30.0 to 30.9 in adult    Erectile dysfunction following radiation therapy    Hematochezia    Hypokalemia    Overweight    Leukopenia    Stage 2 chronic kidney disease    Gastroesophageal reflux disease    History of rectal ulcer    Memory loss    Normocytic anemia    History of prediabetes    Acute pain of right knee    Acute medial meniscal tear, right, subsequent encounter    Tear of medial meniscus of right knee    Effusion of right knee    Internal derangement of right knee           Current Outpatient Medications:     atorvastatin (Lipitor) 20 MG tablet, Take 1 tablet by mouth Every Night., Disp: 90 tablet, Rfl: 0    busPIRone (BUSPAR) 5 MG tablet, Take 1 tablet by mouth 3 (Three) Times a Day As Needed (anxiety)., Disp: 90 tablet, Rfl: 0    calcium carbonate-vitamin d 600-400 MG-UNIT per tablet, Take 1 tablet by mouth Daily., Disp: 90 tablet, Rfl:  1    citalopram (CeleXA) 20 MG tablet, Take 1 tablet by mouth Daily., Disp: 30 tablet, Rfl: 0    dilTIAZem (TIAZAC) 360 MG 24 hr capsule, Take 1 capsule by mouth Daily., Disp: 90 capsule, Rfl: 0    doxazosin (CARDURA) 8 MG tablet, Take 1 tablet by mouth Every Night., Disp: 90 tablet, Rfl: 3    ferrous sulfate 325 (65 Fe) MG tablet, Take 1 tablet by mouth 3 (Three) Times a Day With Meals., Disp: 270 tablet, Rfl: 0    hydroCHLOROthiazide (HYDRODIURIL) 50 MG tablet, Take 0.5 tablets by mouth Daily., Disp: , Rfl:     linaclotide (Linzess) 72 MCG capsule capsule, Take 1 capsule by mouth Every Morning Before Breakfast., Disp: 30 capsule, Rfl: 2    potassium chloride (K-DUR,KLOR-CON) 20 MEQ CR tablet, Take 1 tablet by mouth 3 (Three) Times a Day., Disp: 270 tablet, Rfl: 0    pravastatin (PRAVACHOL) 80 MG tablet, Take 1 tablet by mouth Daily., Disp: , Rfl:     spironolactone (Aldactone) 25 MG tablet, Take 1 tablet by mouth Daily., Disp: 30 tablet, Rfl: 3    telmisartan (MICARDIS) 40 MG tablet, Take 1 tablet by mouth Daily., Disp: 90 tablet, Rfl: 0    Viagra 100 MG tablet, Take 1 tablet by mouth Daily As Needed for Erectile Dysfunction., Disp: 10 tablet, Rfl: 3    vitamin D (ERGOCALCIFEROL) 1.25 MG (16676 UT) capsule capsule, Take 1 capsule by mouth Every 7 (Seven) Days., Disp: 12 capsule, Rfl: 0     Pt is 73 yo male with management of being overweight, ED, HTN, major depression, HLP, history of prostate cancer with radiation therapy, iron deficiency anemia, history of rectal bleeding/ulcer, history of radiation proctitis, diverticulosis, vitamin D deficiency, prediabetes, sp appendectomy, sp cataract surgery, gastritis, CKD stage 2     4/20/23 in office visit for recheck. Pt continues to take his medications for HTN, his prostate issues, ED and iron deficiency and HLP  Pt recently came from [place]. Pt reports no chest pain no dizziness.  He is thinking about moving.. he is having knee pain on right knee for past few weeks. He  denies any trauma or fall. He states he has difficulty living his right leg.  Memory  Is stable.      7/25/23  in office visit for recheck.  Pt saw walk in clinic on 6/5/23 for constipation. X-ray was ordered and pt was advised to continue with miralax. X-ray of abdomen was normal  pt canceled surgery with Orthopedic on 6/6/23 regarding his right knee pain and tear of medial meniscus of right knee. Pt had labwork on 4/21/23 that showed .On CMP potassium slightly low at 3.4 recommend pt switch his chlorthalidone to aldactone 25 mg daily to help maintain potassium levels. On iron studies iron at 50 and saturation is low. On CBC WBC is at 3.10 and hemoglobin at 12.6. pt consistently has leukopenia and anemia. Recommend he see a Hematologist/Oncologist for thisFolic acid, lipid panel and hga1c normal. Pt is currently not diabetic or prediabetic.  He is wanting to start back on celexa  10 mg daily.  He is still having issues with memory issues. Short and long term. He saw Neurology but would like to see another Neurologist     8/25/23 in office visit for recheck pt was at walk in clinic on 8/7/23 and 8/14/23 for his anxiety depression, weight loss, memory loss and constipation. He was given linzess for his constipation. He was advised to continue on celex. Pt was referred to  different Neurologsit for his memory loss an MRI of brain was ordered on 814/23. Pt had labwork on 7/26/23 that showed On CMP fasting sugars at 102 Kidney function stable with GFR at 81.9. Hemoglobin at 11.9 from 12.6 pt has mild anemia. Rest of labwork stable. Pt lost about 10 lbs since his last visit. His appt with Neurology is in October in . His memory is getting worse. He also has had decreased appetite and trouble with bowels. He has been straining when he has bowels.  He also has numbness in hands.  His last EGD was in 2021 with Dr. Ryan that showed gastritis. Last colonoscopy was in 2019 that showed diverticulosis and proctitis. No  problems with balance.  He has not had weakness in limbs. He does have two sexual partners in [place] both female.  He does not use protection.  His last MRI of brain on 8/1/23 showed small old lacunae in basal ganglia along with old injury due to some combination of old inflammatory changes, and old microvascular changes. No acute abnormality was seen. There are old TIAs pt reports no fever but does have night sweats. No lymphadenopathy. He reports currently no GI bleeding         Memory Loss  This is a recurrent problem. The current episode started more than 1 month ago. The problem occurs every several days. The problem has been unchanged. Associated symptoms include arthralgias, fatigue and weakness. Pertinent negatives include no abdominal pain, anorexia, change in bowel habit, chest pain, chills, congestion, coughing, diaphoresis, fever, headaches, joint swelling, myalgias, nausea, neck pain, rash, sore throat, swollen glands, urinary symptoms, vertigo, visual change or vomiting. Nothing aggravates the symptoms. He has tried nothing for the symptoms. The treatment provided no relief.   Weight Loss  This is a recurrent problem. The current episode started more than 1 month ago. The problem has been gradually worsening. Associated symptoms include arthralgias, fatigue and weakness. Pertinent negatives include no abdominal pain, anorexia, change in bowel habit, chest pain, chills, congestion, coughing, diaphoresis, fever, headaches, joint swelling, myalgias, nausea, neck pain, rash, sore throat, swollen glands, urinary symptoms, vertigo, visual change or vomiting. Nothing aggravates the symptoms. He has tried nothing for the symptoms. The treatment provided no relief. GI Problem  The primary symptoms include fatigue, abdominal pain and arthralgias. Primary symptoms do not include fever, weight loss, nausea, vomiting, diarrhea, melena, hematemesis, jaundice, hematochezia, dysuria or myalgias. Primary symptoms  comment: rectal bleeding . The onset was gradual.   The illness does not include chills. Associated medical issues do not include inflammatory bowel disease, GERD, gallstones, liver disease, alcohol abuse, PUD, gastric bypass, bowel resection, irritable bowel syndrome, hemorrhoids or diverticulitis. Associated medical issues comments: rectal bleeding, radiation proctitis .   Chronic Kidney Disease  This is a chronic problem. The current episode started more than 1 year ago. The problem occurs constantly. The problem has been unchanged. Associated symptoms include arthralgias and fatigue. Pertinent negatives include no abdominal pain, anorexia, change in bowel habit, chest pain, chills, congestion, coughing, diaphoresis, fever, headaches, joint swelling, myalgias, nausea, neck pain, numbness, rash, sore throat, swollen glands, urinary symptoms, vertigo, visual change, vomiting or weakness. Nothing aggravates the symptoms. He has tried nothing for the symptoms. The treatment provided no relief.   Heartburn  He complains of heartburn and water brash. He reports no abdominal pain, no belching, no chest pain, no choking, no coughing, no dysphagia, no early satiety, no globus sensation, no hoarse voice, no nausea, no sore throat, no stridor, no tooth decay or no wheezing. This is a recurrent problem. The current episode started today. The problem occurs constantly. The problem has been unchanged. The heartburn is of moderate intensity. The heartburn does not wake him from sleep. The heartburn does not limit his activity. Nothing aggravates the symptoms. Associated symptoms include fatigue. He has tried nothing for the symptoms. The treatment provided no relief. Past procedures do not include an abdominal ultrasound, an EGD, esophageal manometry, esophageal pH monitoring, H. pylori antibody titer or a UGI. Past invasive treatments do not include gastroplasty, gastroplication or reflux surgery.   Hyperlipidemia  This is a  chronic problem. The current episode started more than 1 year ago. The problem is controlled. Recent lipid tests were reviewed and are variable. He has no history of chronic renal disease, diabetes, hypothyroidism, liver disease, obesity or nephrotic syndrome. The current treatment provides moderate improvement of lipids. There are no compliance problems.  Risk factors for coronary artery disease include hypertension, male sex, a sedentary lifestyle and dyslipidemia.   Male  Problem  The patient's pertinent negatives include no genital injury, genital itching, genital lesions, pelvic pain, penile discharge, penile pain, priapism, scrotal swelling or testicular pain. Primary symptoms comment: history of prostate cancer . This is a chronic problem. The current episode started more than 1 month ago. The problem occurs constantly. The problem has been unchanged. Pertinent negatives include no abdominal pain, anorexia, chest pain, chills, constipation, coughing, diarrhea, discolored urine, dysuria, fever, flank pain, frequency, headaches, hematuria, hesitancy, joint pain, joint swelling, nausea, painful intercourse, rash, shortness of breath, sore throat, urgency, urinary retention or vomiting.   Hypertension   This is a chronic problem. The current episode started more than 1 year ago. The problem is unchanged. The problem is controlled. Pertinent negatives include no anxiety, blurred vision, chest pain, headaches, malaise/fatigue, neck pain, orthopnea, palpitations, peripheral edema, PND, shortness of breath or sweats. Risk factors for coronary artery disease include male gender, obesity, dyslipidemia and sedentary lifestyle. Past treatments include angiotensin blockers, calcium channel blockers and diuretics. Current antihypertension treatment includes angiotensin blockers, calcium channel blockers and diuretics. The current treatment provides moderate improvement. There is no history of angina, kidney disease,  CAD/MI, CVA, heart failure, left ventricular hypertrophy, PVD or retinopathy. There is no history of chronic renal disease, coarctation of the aorta, hyperaldosteronism, hypercortisolism, hyperparathyroidism, a hypertension causing med, pheochromocytoma, renovascular disease, sleep apnea or a thyroid problem.   Anemia   Presents for follow-up visit. There has been no abdominal pain, anorexia, bruising/bleeding easily, confusion, fever, leg swelling, light-headedness, malaise/fatigue, pallor, palpitations, paresthesias, pica or weight loss. Signs of blood loss that are not present include hematemesis, hematochezia, melena, menorrhagia and vaginal bleeding. There is no history of chronic renal disease or heart failure. There are no compliance problems.  Compliance with medications is 0-25%.        Review of Systems  Review of Systems   Constitutional:  Positive for activity change, fatigue, unexpected weight change and weight loss. Negative for appetite change, chills, diaphoresis and fever.   HENT:  Negative for congestion, postnasal drip, rhinorrhea, sinus pressure, sinus pain, sneezing, sore throat, trouble swallowing and voice change.    Respiratory:  Positive for shortness of breath. Negative for cough, choking, chest tightness, wheezing and stridor.    Cardiovascular:  Negative for chest pain.   Gastrointestinal:  Positive for blood in stool. Negative for abdominal pain, anorexia, change in bowel habit, diarrhea, nausea and vomiting.   Musculoskeletal:  Positive for arthralgias. Negative for joint swelling, myalgias and neck pain.   Skin:  Negative for rash.   Neurological:  Positive for weakness. Negative for vertigo and headaches.   Psychiatric/Behavioral:  Positive for decreased concentration. Negative for behavioral problems.      Patient Active Problem List   Diagnosis    Hyperlipidemia    Impaired fasting glucose     Vitamin D deficiency     Depression    Bereavement    Anemia    Iron deficiency anemia     Need for hepatitis C screening test    Palpable mass of neck    Encounter for immunization    Radiation proctitis    Diverticulosis of large intestine without hemorrhage    Lower GI bleeding    Pruritic rash    Lipoma of back    Essential hypertension    Erectile dysfunction    Need for vaccination    Medicare annual wellness visit, initial    Rectal bleeding    Change in bowel habits    General medical examination    Sleep disorder    Other fatigue    Obesity    Prediabetes    Encounter for screening for endocrine disorder    Moderate episode of recurrent major depressive disorder    Unintentional weight loss    Encounter for screening for malignant neoplasm of prostate     History of prostate cancer    Blood in stool    Nasal congestion    Class 1 obesity with serious comorbidity and body mass index (BMI) of 30.0 to 30.9 in adult    Erectile dysfunction following radiation therapy    Hematochezia    Hypokalemia    Overweight    Leukopenia    Stage 2 chronic kidney disease    Gastroesophageal reflux disease    History of rectal ulcer    Memory loss    Normocytic anemia    History of prediabetes    Acute pain of right knee    Acute medial meniscal tear, right, subsequent encounter    Tear of medial meniscus of right knee    Effusion of right knee    Internal derangement of right knee     Past Surgical History:   Procedure Laterality Date    APPENDECTOMY      CATARACT EXTRACTION Bilateral 2013    COLONOSCOPY      diverticulosis in hte sigmoid colon. Altered vascular mucosa in hte rectum. hemorrhoids, no specimens collected, radation proctitis    COLONOSCOPY N/A 10/19/2017    Procedure: COLONOSCOPY;  Surgeon: Patric Wooten MD;  Location: [facility] ENDOSCOPY;  Service:      Social History     Socioeconomic History    Marital status:    Tobacco Use    Smoking status: Never    Smokeless tobacco: Never   Vaping Use    Vaping Use: Never used   Substance and Sexual Activity    Alcohol use: Yes     Comment: 08/30/2018  - Patient reports occasional consumption of alcoholic beverages.    Drug use: No    Sexual activity: Defer     Family History   Problem Relation Age of Onset    Cancer Mother     Hypertension Brother      Clinical Support on 08/18/2023   Component Date Value Ref Range Status    SARS Antigen 08/18/2023 Detected (A)  Not Detected, Presumptive Negative Final    Internal Control 08/18/2023 Passed  Passed Final    Lot Number 08/18/2023 [id number]   Final    Expiration Date 08/18/2023 08-   Final   Lab on 07/26/2023   Component Date Value Ref Range Status    WBC 07/26/2023 3.20 (L)  3.40 - 10.80 10*3/mm3 Final    RBC 07/26/2023 3.93 (L)  4.14 - 5.80 10*6/mm3 Final    Hemoglobin 07/26/2023 11.9 (L)  13.0 - 17.7 g/dL Final    Hematocrit 07/26/2023 34.9 (L)  37.5 - 51.0 % Final    MCV 07/26/2023 88.8  79.0 - 97.0 fL Final    MCH 07/26/2023 30.3  26.6 - 33.0 pg Final    MCHC 07/26/2023 34.1  31.5 - 35.7 g/dL Final    RDW 07/26/2023 12.9  12.3 - 15.4 % Final    RDW-SD 07/26/2023 41.5  37.0 - 54.0 fl Final    MPV 07/26/2023 10.1  6.0 - 12.0 fL Final    Platelets 07/26/2023 262  140 - 450 10*3/mm3 Final    Neutrophil % 07/26/2023 62.7  42.7 - 76.0 % Final    Lymphocyte % 07/26/2023 21.9  19.6 - 45.3 % Final    Monocyte % 07/26/2023 11.3  5.0 - 12.0 % Final    Eosinophil % 07/26/2023 2.5  0.3 - 6.2 % Final    Basophil % 07/26/2023 1.6 (H)  0.0 - 1.5 % Final    Immature Grans % 07/26/2023 0.0  0.0 - 0.5 % Final    Neutrophils, Absolute 07/26/2023 2.01  1.70 - 7.00 10*3/mm3 Final    Lymphocytes, Absolute 07/26/2023 0.70  0.70 - 3.10 10*3/mm3 Final    Monocytes, Absolute 07/26/2023 0.36  0.10 - 0.90 10*3/mm3 Final    Eosinophils, Absolute 07/26/2023 0.08  0.00 - 0.40 10*3/mm3 Final    Basophils, Absolute 07/26/2023 0.05  0.00 - 0.20 10*3/mm3 Final    Immature Grans, Absolute 07/26/2023 0.00  0.00 - 0.05 10*3/mm3 Final    nRBC 07/26/2023 0.0  0.0 - 0.2 /100 WBC Final    Glucose 07/26/2023 102 (H)  65 - 99 mg/dL Final    BUN  07/26/2023 10  8 - 23 mg/dL Final    Creatinine 07/26/2023 0.97  0.76 - 1.27 mg/dL Final    Sodium 07/26/2023 140  136 - 145 mmol/L Final    Potassium 07/26/2023 4.4  3.5 - 5.2 mmol/L Final    Chloride 07/26/2023 106  98 - 107 mmol/L Final    CO2 07/26/2023 26.0  22.0 - 29.0 mmol/L Final    Calcium 07/26/2023 9.9  8.6 - 10.5 mg/dL Final    Total Protein 07/26/2023 7.0  6.0 - 8.5 g/dL Final    Albumin 07/26/2023 4.4  3.5 - 5.2 g/dL Final    ALT (SGPT) 07/26/2023 9  1 - 41 U/L Final    AST (SGOT) 07/26/2023 12  1 - 40 U/L Final    Alkaline Phosphatase 07/26/2023 82  39 - 117 U/L Final    Total Bilirubin 07/26/2023 0.5  0.0 - 1.2 mg/dL Final    Globulin 07/26/2023 2.6  gm/dL Final    A/G Ratio 07/26/2023 1.7  g/dL Final    BUN/Creatinine Ratio 07/26/2023 10.3  7.0 - 25.0 Final    Anion Gap 07/26/2023 8.0  5.0 - 15.0 mmol/L Final    eGFR 07/26/2023 81.9  >60.0 mL/min/1.73 Final    Total Cholesterol 07/26/2023 143  0 - 200 mg/dL Final    Triglycerides 07/26/2023 65  0 - 150 mg/dL Final    HDL Cholesterol 07/26/2023 59  40 - 60 mg/dL Final    LDL Cholesterol  07/26/2023 71  0 - 100 mg/dL Final    VLDL Cholesterol 07/26/2023 13  5 - 40 mg/dL Final    LDL/HDL Ratio 07/26/2023 1.20   Final    TSH 07/26/2023 1.370  0.270 - 4.200 uIU/mL Final   Lab on 04/21/2023   Component Date Value Ref Range Status    WBC 04/21/2023 3.10 (L)  3.40 - 10.80 10*3/mm3 Final    RBC 04/21/2023 4.19  4.14 - 5.80 10*6/mm3 Final    Hemoglobin 04/21/2023 12.6 (L)  13.0 - 17.7 g/dL Final    Hematocrit 04/21/2023 37.1 (L)  37.5 - 51.0 % Final    MCV 04/21/2023 88.5  79.0 - 97.0 fL Final    MCH 04/21/2023 30.1  26.6 - 33.0 pg Final    MCHC 04/21/2023 34.0  31.5 - 35.7 g/dL Final    RDW 04/21/2023 12.4  12.3 - 15.4 % Final    RDW-SD 04/21/2023 40.7  37.0 - 54.0 fl Final    MPV 04/21/2023 10.0  6.0 - 12.0 fL Final    Platelets 04/21/2023 255  140 - 450 10*3/mm3 Final    Neutrophil % 04/21/2023 45.8  42.7 - 76.0 % Final    Lymphocyte % 04/21/2023 29.7   19.6 - 45.3 % Final    Monocyte % 04/21/2023 14.2 (H)  5.0 - 12.0 % Final    Eosinophil % 04/21/2023 8.4 (H)  0.3 - 6.2 % Final    Basophil % 04/21/2023 1.9 (H)  0.0 - 1.5 % Final    Immature Grans % 04/21/2023 0.0  0.0 - 0.5 % Final    Neutrophils, Absolute 04/21/2023 1.42 (L)  1.70 - 7.00 10*3/mm3 Final    Lymphocytes, Absolute 04/21/2023 0.92  0.70 - 3.10 10*3/mm3 Final    Monocytes, Absolute 04/21/2023 0.44  0.10 - 0.90 10*3/mm3 Final    Eosinophils, Absolute 04/21/2023 0.26  0.00 - 0.40 10*3/mm3 Final    Basophils, Absolute 04/21/2023 0.06  0.00 - 0.20 10*3/mm3 Final    Immature Grans, Absolute 04/21/2023 0.00  0.00 - 0.05 10*3/mm3 Final    nRBC 04/21/2023 0.0  0.0 - 0.2 /100 WBC Final    Glucose 04/21/2023 96  65 - 99 mg/dL Final    BUN 04/21/2023 17  8 - 23 mg/dL Final    Creatinine 04/21/2023 1.18  0.76 - 1.27 mg/dL Final    Sodium 04/21/2023 140  136 - 145 mmol/L Final    Potassium 04/21/2023 3.4 (L)  3.5 - 5.2 mmol/L Final    Chloride 04/21/2023 103  98 - 107 mmol/L Final    CO2 04/21/2023 29.6 (H)  22.0 - 29.0 mmol/L Final    Calcium 04/21/2023 10.1  8.6 - 10.5 mg/dL Final    Total Protein 04/21/2023 7.0  6.0 - 8.5 g/dL Final    Albumin 04/21/2023 3.8  3.5 - 5.2 g/dL Final    ALT (SGPT) 04/21/2023 13  1 - 41 U/L Final    AST (SGOT) 04/21/2023 14  1 - 40 U/L Final    Alkaline Phosphatase 04/21/2023 86  39 - 117 U/L Final    Total Bilirubin 04/21/2023 0.3  0.0 - 1.2 mg/dL Final    Globulin 04/21/2023 3.2  gm/dL Final    A/G Ratio 04/21/2023 1.2  g/dL Final    BUN/Creatinine Ratio 04/21/2023 14.4  7.0 - 25.0 Final    Anion Gap 04/21/2023 7.4  5.0 - 15.0 mmol/L Final    eGFR 04/21/2023 64.8  >60.0 mL/min/1.73 Final    Hemoglobin A1C 04/21/2023 5.50  4.80 - 5.60 % Final    Total Cholesterol 04/21/2023 155  0 - 200 mg/dL Final    Triglycerides 04/21/2023 57  0 - 150 mg/dL Final    HDL Cholesterol 04/21/2023 54  40 - 60 mg/dL Final    LDL Cholesterol  04/21/2023 89  0 - 100 mg/dL Final    VLDL Cholesterol  "04/21/2023 12  5 - 40 mg/dL Final    LDL/HDL Ratio 04/21/2023 1.66   Final    Iron 04/21/2023 50 (L)  59 - 158 mcg/dL Final    Iron Saturation (TSAT) 04/21/2023 16 (L)  20 - 50 % Final    Transferrin 04/21/2023 212  200 - 360 mg/dL Final    TIBC 04/21/2023 316  298 - 536 mcg/dL Final    Folate 04/21/2023 16.20  4.78 - 24.20 ng/mL Final      XR Abdomen Flat & Upright  Narrative: Two-view abdomen    HISTORY:  Constipation.    COMPARISON:  None.  Impression: Findings/impression:  Upright and supine views of the abdomen are obtained.  No abnormally dilated  bowel loops to suggest obstruction.  No free air or worrisome air-fluid levels.  Mild stool burden noted within the cecum, ascending colon and proximal  transverse colon.  Normal stool burden in the remainder of the colon.    No acute osseous abnormality.  Sclerotic changes project over the right femoral  head, which may represent osteonecrosis/AVN.    [email or web address]  Immunization History   Administered Date(s) Administered    COVID-19 (MODERNA) 1st,2nd,3rd Dose Monovalent 02/01/2021, 02/01/2021, 02/23/2021, 02/23/2021, 03/01/2021, 03/01/2021, 03/23/2021, 03/23/2021, 11/16/2021    Flu Vaccine Quad PF >36MO 11/15/2017    Fluad Quad 65+ 09/16/2020    Fluzone Quad >6mos (Multi-dose) 09/16/2020, 09/16/2020    Influenza Split Preservative Free ID 10/20/2016    Influenza, Unspecified 09/16/2020    Pneumococcal Polysaccharide (PPSV23) 03/31/2017    Tdap 03/31/2017    influenza Split 10/20/2016       The following portions of the patient's history were reviewed and updated as appropriate: allergies, current medications, past family history, past medical history, past social history, past surgical history and problem list.        Physical Exam  /68 (BP Location: Left arm, Patient Position: Sitting, Cuff Size: Adult)   Pulse 88   Temp 98 øF (36.7 øC) (Temporal)   Ht 185.4 cm (73\")   Wt 89.7 kg (197 lb 12.8 oz)   SpO2 99%   BMI 26.10 kg/mý     /68 (BP Location: " "Left arm, Patient Position: Sitting, Cuff Size: Adult)   Pulse 88   Temp 98 øF (36.7 øC) (Temporal)   Ht 185.4 cm (73\")   Wt 89.7 kg (197 lb 12.8 oz)   SpO2 99%   BMI 26.10 kg/mý         Physical Exam  Vitals and nursing note reviewed.   Constitutional:       Appearance: He is well-developed. He is not diaphoretic.   HENT:      Head: Normocephalic and atraumatic.      Right Ear: External ear normal.   Eyes:      Conjunctiva/sclera: Conjunctivae normal.      Pupils: Pupils are equal, round, and reactive to light.   Cardiovascular:      Rate and Rhythm: Normal rate and regular rhythm.      Heart sounds: Murmur heard.   Pulmonary:      Effort: Pulmonary effort is normal. No respiratory distress.      Breath sounds: Normal breath sounds.   Abdominal:      General: Bowel sounds are normal. There is no distension.      Palpations: Abdomen is soft.      Tenderness: There is no abdominal tenderness.   Musculoskeletal:         General: Tenderness present. No deformity.      Cervical back: Normal range of motion and neck supple.      Right knee: Bony tenderness present. Decreased range of motion. Tenderness present over the medial joint line, MCL and PCL. Abnormal patellar mobility.      Instability Tests: Posterior drawer test positive.   Skin:     General: Skin is warm.      Coloration: Skin is not pale.      Findings: No erythema or rash.   Neurological:      Mental Status: He is alert and oriented to person, place, and time.      Cranial Nerves: No cranial nerve deficit.   Psychiatric:         Behavior: Behavior normal.       [unfilled]   Diagnosis Plan   1. History of prostate cancer  PSA DIAGNOSTIC ONLY      2. Essential hypertension  CBC Auto Differential    Comprehensive Metabolic Panel    Vitamin D,25-Hydroxy    Vitamin B12    Sedimentation rate, automated    C-reactive protein    Iron Profile    HIV-1/O/2 Ag/Ab w Reflex      3. Vitamin D deficiency   CBC Auto Differential    Comprehensive Metabolic Panel "    Vitamin D,25-Hydroxy    Vitamin B12    Sedimentation rate, automated    C-reactive protein    Iron Profile    HIV-1/O/2 Ag/Ab w Reflex      4. Overweight  CBC Auto Differential    Comprehensive Metabolic Panel    Vitamin D,25-Hydroxy    Vitamin B12    Sedimentation rate, automated    C-reactive protein    Iron Profile    HIV-1/O/2 Ag/Ab w Reflex      5. Gastroesophageal reflux disease, unspecified whether esophagitis present  CBC Auto Differential    Comprehensive Metabolic Panel    Vitamin D,25-Hydroxy    Vitamin B12    Sedimentation rate, automated    C-reactive protein    Iron Profile    HIV-1/O/2 Ag/Ab w Reflex      6. Stage 2 chronic kidney disease  CBC Auto Differential    Comprehensive Metabolic Panel    Vitamin D,25-Hydroxy    Vitamin B12    Sedimentation rate, automated    C-reactive protein    Iron Profile    HIV-1/O/2 Ag/Ab w Reflex      7. Memory loss  CBC Auto Differential    Comprehensive Metabolic Panel    Vitamin D,25-Hydroxy    Vitamin B12    Sedimentation rate, automated    C-reactive protein    Iron Profile    HIV-1/O/2 Ag/Ab w Reflex      8. Moderate episode of recurrent major depressive disorder  CBC Auto Differential    Comprehensive Metabolic Panel    Vitamin D,25-Hydroxy    Vitamin B12    Sedimentation rate, automated    C-reactive protein    Iron Profile    HIV-1/O/2 Ag/Ab w Reflex      9. Normocytic anemia  CBC Auto Differential    Comprehensive Metabolic Panel    Vitamin D,25-Hydroxy    Vitamin B12    Sedimentation rate, automated    C-reactive protein    Iron Profile    HIV-1/O/2 Ag/Ab w Reflex      10. Iron deficiency anemia, unspecified iron deficiency anemia type  CBC Auto Differential    Comprehensive Metabolic Panel    Vitamin D,25-Hydroxy    Vitamin B12    Sedimentation rate, automated    C-reactive protein    Iron Profile    HIV-1/O/2 Ag/Ab w Reflex      11. History of prediabetes  CBC Auto Differential    Comprehensive Metabolic Panel    Vitamin D,25-Hydroxy    Vitamin B12     Sedimentation rate, automated    C-reactive protein    Iron Profile    HIV-1/O/2 Ag/Ab w Reflex      12. Unintentional weight loss  CBC Auto Differential    Comprehensive Metabolic Panel    Vitamin D,25-Hydroxy    Vitamin B12    Sedimentation rate, automated    C-reactive protein    Iron Profile    HIV-1/O/2 Ag/Ab w Reflex    Urinalysis With Microscopic - Urine, Clean Catch    Urine Culture - Urine, Urine, Clean Catch    MOON    Ambulatory Referral to Gastroenterology    XR Chest PA & Lateral      13. Abnormal urine levels of substances chiefly nonmedicinal as to source  Urine Culture - Urine, Urine, Clean Catch      14. History of proctitis  Ambulatory Referral to Gastroenterology      15. History of gastritis  Ambulatory Referral to Gastroenterology      16. Decreased appetite  Ambulatory Referral to Gastroenterology                   -recommend labwork  -ED - on viagra 100 mg PO PRN.   -GERD will get last EGD and colonoscopy from Dr. Ryan. Start on pepcid 20 mg PO BID. Information provided   -hypokalemia - on potassium supplement on aldactone  -leukopenia - recheck CBC. Recommend Hematology referral   -unintentional weight loss/decreased appetite - will refer to Gastroenterology for endoscopy. Also check CBC, CMP CRP, ESR, HIV, MOON. Last endoscopy was in 2019 and 2021  will get chest x-ray. Consider CT of chest and abdomen if results are normal   -memory problem -  Concerned about Dementia referred to Neurology. Pt reports today he does not want to proceed with MRI of brain. If getting worse recommend he call us for an appt or see Neuorlogy for 2nd opinion in [place]   -CKD stage 2 - continue to monitor  -history of prostate cancer -Urology/Radiation Oncology following  in [facility] -last PSA was normal.check PSA  -fatigue/sleep disorder -  Pt referred to sleep study with Dr. Bella but did not complete sleep study. Pt was given number to reschedule sleep study  -Vitamin D deficiency - continue  vitamin D pill. Recheck vitamin D levels   -hypertension -   on aldactone 25  mg dialy. on micardis 40 mg PO q daily.on dilitzem 360 mg PO q daily.  on cardura 8 mg PO qhs.   advised pt to monitor blood pressure at home and bring readings   -overweight   - counseled weight loss >5 minutes BMI at 26.10   -hyperlipidemia -on pravastatin.  recommend heart healthy diet   -history rectal ulcer rectal ulcer/diverticulosis/ rectal bleeding/history of radiation proctitis -  was on  mesalamine suppository 1000 mg rectal at bedtime.  Recommend high fiber diet.  pt states he noticed a little blood. He is not concerned. If getting worse he will let me know.  Would consider referral back to GI   -depression - stopped celexa 20 mg PO q daily.  restart on celexa 10 mg PO q daily.    -iron deficiency anemia/normocytic aenmia  - was seeing Hematology in the past on ferrous sulfate 325 mg PO TID. Check iron levels   -advised pt to be safe and call with questions and concerns  -advised pt to go to ER or call 911 if symptoms worrisome or severe  -advised to followup with specialist and referrals   I spent 35 minutes caring for German on this date of service. This time includes time spent by me in the following activities: preparing for the visit, reviewing tests, obtaining and/or reviewing a separately obtained history, performing a medically appropriate examination and/or evaluation, counseling and educating the patient/family/caregiver, ordering medications, tests, or procedures, referring and communicating with other health care professionals, documenting information in the medical record, independently interpreting results and communicating that information with the patient/family/caregiver and care coordination         This document has been electronically signed by Abel Hoffman MD on August 25, 2023 10:23 CDT

## 2023-08-07 ENCOUNTER — OFFICE VISIT (OUTPATIENT)
Dept: FAMILY MEDICINE CLINIC | Facility: CLINIC | Age: 73
End: 2023-08-07
Payer: MEDICARE

## 2023-08-07 VITALS
HEART RATE: 85 BPM | OXYGEN SATURATION: 98 % | WEIGHT: 202 LBS | HEIGHT: 73 IN | TEMPERATURE: 98.3 F | BODY MASS INDEX: 26.77 KG/M2 | SYSTOLIC BLOOD PRESSURE: 120 MMHG | DIASTOLIC BLOOD PRESSURE: 62 MMHG

## 2023-08-07 DIAGNOSIS — F41.9 ANXIETY AND DEPRESSION: Chronic | ICD-10-CM

## 2023-08-07 DIAGNOSIS — K59.04 CHRONIC IDIOPATHIC CONSTIPATION: Primary | Chronic | ICD-10-CM

## 2023-08-07 DIAGNOSIS — R20.2 PARESTHESIAS: ICD-10-CM

## 2023-08-07 DIAGNOSIS — R63.0 DECREASED APPETITE: ICD-10-CM

## 2023-08-07 DIAGNOSIS — F32.A ANXIETY AND DEPRESSION: Chronic | ICD-10-CM

## 2023-08-07 DIAGNOSIS — R41.3 MEMORY LOSS: ICD-10-CM

## 2023-08-07 DIAGNOSIS — R63.4 WEIGHT LOSS: ICD-10-CM

## 2023-08-07 PROCEDURE — 3078F DIAST BP <80 MM HG: CPT | Performed by: NURSE PRACTITIONER

## 2023-08-07 PROCEDURE — 99215 OFFICE O/P EST HI 40 MIN: CPT | Performed by: NURSE PRACTITIONER

## 2023-08-07 PROCEDURE — 3074F SYST BP LT 130 MM HG: CPT | Performed by: NURSE PRACTITIONER

## 2023-08-07 PROCEDURE — 1160F RVW MEDS BY RX/DR IN RCRD: CPT | Performed by: NURSE PRACTITIONER

## 2023-08-07 PROCEDURE — 1159F MED LIST DOCD IN RCRD: CPT | Performed by: NURSE PRACTITIONER

## 2023-08-07 RX ORDER — HYDROCHLOROTHIAZIDE 50 MG/1
25 TABLET ORAL DAILY
COMMUNITY

## 2023-08-07 RX ORDER — PRAVASTATIN SODIUM 80 MG/1
80 TABLET ORAL DAILY
COMMUNITY

## 2023-08-07 NOTE — PROGRESS NOTES
"Chief Complaint  Constipation (X 6 months tried OTC stool softeners.), Anxiety (Started Celexa last visit with Yasmin.  Having more panic attacks and heart starts pounding.), Weight Loss (Can't eat nothing taste the same.), and Numbness (Just in hands X 1 week ago.)    Subjective          German Ray presents to [facility] [place]    History of Present Illness  FP Same Day/Walk in Clinic    PCP: Dr. Hoffman    CC: \"constipation, anxiety, memory problems, weight loss/no appetite; numbness in hands\"    Presents with multiple complaints/concerns.  Has friend who accompanies him to visit today. Was seen by PCP on 7- for routine follow up.  Had continuing concerns regarding memory loss, concern for dementia.  Has previously seen neurology earlier this year (Dr. Lo), would like 2nd opinion and was referred to [facility] in [place].  Referral is pending as of now.  Has been having a lot of anxiety/stress regarding his health.  Has had 15 pound weight loss since May and not much of an appetite.  Normally will eat very small meal for breakfast (1/2 a sandwich and OJ and coffee) and not eat lunch and then eat dinner (only very small amounts).  Has been taking an occasional boost.  Concerned with chronic constipation.  Taking OTC stool softener daily and previously tried Miralax without improvement.  Took magnesium citrate last week and was able to have a decent BM.  Reports since that time, has only had one small BM that required a lot of straining yesterday.  Would like to try something else to help with the constipation.  Last colonoscopy in 2019 per Dr. Ryan showed diverticula, no polyps, with recommended repeat in 10 years. PCP restarted him on Celexa at most recent visit and has f/u end of this month.  Currently on 10 mg, was previously on 20 mg, may need to increase at f/u if not helping.  Recently had labs from PCP that are mostly " "stable.  New complaints of numbness/tingling/pain in bilateral hands that often wakes him up at night and he has to massage hands.  Denies hx of carpal tunnel syndrome in the past.       Review of Systems   Constitutional:  Positive for appetite change and unexpected weight change. Negative for diaphoresis and fever.   HENT: Negative.     Eyes: Negative.    Respiratory: Negative.     Cardiovascular: Negative.    Gastrointestinal:  Positive for anal bleeding (occasional with painful BM's) and constipation. Negative for abdominal pain, diarrhea, nausea and vomiting. Rectal pain: with straining at defecation.  Genitourinary: Negative.    Musculoskeletal: Negative.    Skin: Negative.    Neurological:  Positive for numbness (bilateral hands at night). Negative for dizziness, light-headedness and headaches.   Psychiatric/Behavioral:  Negative for self-injury and suicidal ideas. The patient is nervous/anxious.       Objective   Vital Signs:   /62 (BP Location: Right arm, Patient Position: Sitting, Cuff Size: Adult)   Pulse 85   Temp 98.3 øF (36.8 øC) (Oral)   Ht 185.4 cm (73\")   Wt 91.6 kg (202 lb)   SpO2 98%   BMI 26.65 kg/mý       Physical Exam  Vitals and nursing note reviewed.   Constitutional:       General: He is not in acute distress.     Appearance: He is not ill-appearing.   HENT:      Head: Normocephalic and atraumatic.   Cardiovascular:      Rate and Rhythm: Normal rate and regular rhythm.      Heart sounds: Murmur heard.   Pulmonary:      Effort: Pulmonary effort is normal. No respiratory distress.      Breath sounds: Normal breath sounds. No wheezing, rhonchi or rales.   Abdominal:      General: Bowel sounds are normal. There is no distension.      Palpations: Abdomen is soft. There is no mass.      Tenderness: There is no abdominal tenderness. There is no right CVA tenderness, left CVA tenderness, guarding or rebound.      Hernia: No hernia is present.   Musculoskeletal:      Cervical back: Neck " supple. No tenderness.   Lymphadenopathy:      Cervical: No cervical adenopathy.   Skin:     General: Skin is warm and dry.   Neurological:      General: No focal deficit present.      Mental Status: He is alert and oriented to person, place, and time.      Motor: Motor function is intact. No weakness.      Coordination: Coordination normal.      Gait: Gait normal.      Comments: Negative pain with tapping of median nerve to bilateral wrists   Psychiatric:         Attention and Perception: Attention normal.         Mood and Affect: Mood is anxious.         Behavior: Behavior is cooperative.         Thought Content: Thought content normal.         Cognition and Memory: He exhibits impaired recent memory (some) and impaired remote memory (some).        Result Review :     Common labs          11/11/2022    09:55 4/21/2023    09:44 7/26/2023    10:38   Common Labs   Glucose 90  96  102    BUN 15  17  10    Creatinine 1.12  1.18  0.97    Sodium 142  140  140    Potassium 3.3  3.4  4.4    Chloride 104  103  106    Calcium 9.8  10.1  9.9    Albumin 3.70  3.8  4.4    Total Bilirubin 0.5  0.3  0.5    Alkaline Phosphatase 87  86  82    AST (SGOT) 18  14  12    ALT (SGPT) 15  13  9    WBC 3.77  3.10  3.20    Hemoglobin 12.7  12.6  11.9    Hematocrit 37.5  37.1  34.9    Platelets 254  255  262    Total Cholesterol 184  155  143    Triglycerides 86  57  65    HDL Cholesterol 62  54  59    LDL Cholesterol  106  89  71    Hemoglobin A1C  5.50       TSH          11/11/2022    09:55 7/26/2023    10:38   TSH   TSH 2.780  1.370                Assessment and Plan    Diagnoses and all orders for this visit:    1. Chronic idiopathic constipation (Primary)  -     linaclotide (Linzess) 72 MCG capsule capsule; Take 1 capsule by mouth Every Morning Before Breakfast.  Dispense: 30 capsule; Refill: 2    2. Anxiety and depression    3. Paresthesias  Comments:  bilateral hands at night    4. Weight loss    5. Decreased appetite    6. Memory  loss    Increase water (goal of 2l/day) and fiber in diet.    Continue with stool softener daily  Rx for Linzess with instructions given.     Encouraged to increased boost to at least once per day, if he is unable to eat a  meal at that time  Try to increase amount of fiber/protein at meals    Continue with Celexa 10 mg daily and keep f/u with PCP on 8-25-23 to discuss possible need for increase in dosage    Will have referral coordinator check on neurology referral status    Suspect possible carpal tunnel symptoms.  Wrist braces provided to use at night for now.  Recheck with PCP if symptoms persist.     See PCP as scheduled on 8- for recheck of above concerns.     This document has been electronically signed by JOSE MIGUEL Hanson on August 7, 2023 11:03 CDT,.    I spent 45 minutes caring for German on this date of service. This time includes time spent by me in the following activities:preparing for the visit, reviewing tests, performing a medically appropriate examination and/or evaluation , counseling and educating the patient/family/caregiver, ordering medications, tests, or procedures, documenting information in the medical record, and care coordination

## 2023-08-08 ENCOUNTER — TELEPHONE (OUTPATIENT)
Dept: FAMILY MEDICINE CLINIC | Facility: CLINIC | Age: 73
End: 2023-08-08
Payer: MEDICARE

## 2023-08-08 ENCOUNTER — TELEPHONE (OUTPATIENT)
Dept: FAMILY MEDICINE CLINIC | Facility: CLINIC | Age: 73
End: 2023-08-08

## 2023-08-08 NOTE — TELEPHONE ENCOUNTER
Patient came in stating his linzess is needing a PA in order for him to  from pharmacy. Would like a call back when medication is approved.  Call back number: [phone]

## 2023-08-14 ENCOUNTER — OFFICE VISIT (OUTPATIENT)
Dept: FAMILY MEDICINE CLINIC | Facility: CLINIC | Age: 73
End: 2023-08-14
Payer: MEDICARE

## 2023-08-14 VITALS
HEIGHT: 73 IN | WEIGHT: 201 LBS | SYSTOLIC BLOOD PRESSURE: 110 MMHG | BODY MASS INDEX: 26.64 KG/M2 | HEART RATE: 86 BPM | OXYGEN SATURATION: 99 % | DIASTOLIC BLOOD PRESSURE: 58 MMHG | TEMPERATURE: 98.2 F

## 2023-08-14 DIAGNOSIS — F41.9 ANXIETY: ICD-10-CM

## 2023-08-14 DIAGNOSIS — R41.3 MEMORY LOSS: Primary | ICD-10-CM

## 2023-08-14 PROCEDURE — 3078F DIAST BP <80 MM HG: CPT | Performed by: NURSE PRACTITIONER

## 2023-08-14 PROCEDURE — 99214 OFFICE O/P EST MOD 30 MIN: CPT | Performed by: NURSE PRACTITIONER

## 2023-08-14 PROCEDURE — 1160F RVW MEDS BY RX/DR IN RCRD: CPT | Performed by: NURSE PRACTITIONER

## 2023-08-14 PROCEDURE — 3074F SYST BP LT 130 MM HG: CPT | Performed by: NURSE PRACTITIONER

## 2023-08-14 PROCEDURE — 1159F MED LIST DOCD IN RCRD: CPT | Performed by: NURSE PRACTITIONER

## 2023-08-14 RX ORDER — BUSPIRONE HYDROCHLORIDE 5 MG/1
5 TABLET ORAL 3 TIMES DAILY PRN
Qty: 90 TABLET | Refills: 0 | Status: SHIPPED | OUTPATIENT
Start: 2023-08-14

## 2023-08-14 RX ORDER — CITALOPRAM 20 MG/1
20 TABLET ORAL DAILY
Qty: 30 TABLET | Refills: 0 | Status: SHIPPED | OUTPATIENT
Start: 2023-08-14

## 2023-08-14 NOTE — PROGRESS NOTES
"Chief Complaint  Panic Attack    Subjective          German Ray presents to [facility] [place]    History of Present Illness  FP Same Day/Walk in Clinic    PCP: Dr. Hoffman    CC: \"panic attacks; memory loss\"    RTC with continued concerns about anxiety, panic attacks and memory loss.  Reports father had alzheimers and this has him concerned.  Has been noticing increase in trouble with his memory.  Has a friend with him today that has been assisting him.  Reporting that he feels most of his anxiety/panic attacks are secondary to not being able to remember things.  Had been seen by neurology, Dr. Lo, but wanted second opinion. Referral was placed by PCP to neurology--has appt at [facility] on 10/12/@ 1230.  Has not had MRI brain performed yet, will work on getting this done prior to neurology appointment.  Reports anxiety attacks are daily, sometimes 2-3 times per day that is causing frequent pounding of his heart/chest, lasts a few minutes.  Currently on citalopram 10 mg x 3 weeks per PCP, but hasn't seen much change.  Has been on previously and was on 20 mg. Has f/u with PCP on 8-.  Does report he has been forcing himself to eat more and weight has stabilized, no additional loss since last appointment.            Review of Systems   Constitutional:  Negative for appetite change, chills, fatigue and fever. Unexpected weight change: no additional weigh loss noted.  Respiratory: Negative.     Cardiovascular:  Positive for chest pain (with anxiety only) and palpitations (with anxiety). Negative for leg swelling.   Gastrointestinal:  Positive for constipation (waiting on Linzess PA). Negative for nausea and vomiting.   Genitourinary: Negative.    Musculoskeletal: Negative.    Skin: Negative.    Neurological:  Negative for dizziness, speech difficulty, weakness and headaches.        +memory problems      Objective   Vital Signs:   /58 (BP " "Location: Left arm, Patient Position: Sitting, Cuff Size: Adult)   Pulse 86   Temp 98.2 øF (36.8 øC) (Oral)   Ht 185.4 cm (73\")   Wt 91.2 kg (201 lb)   SpO2 99%   BMI 26.52 kg/mý       Physical Exam  Vitals and nursing note reviewed.   Constitutional:       General: He is not in acute distress.     Appearance: He is not ill-appearing.   HENT:      Head: Normocephalic and atraumatic.   Eyes:      Extraocular Movements: Extraocular movements intact.      Pupils: Pupils are equal, round, and reactive to light.   Cardiovascular:      Rate and Rhythm: Normal rate and regular rhythm.   Pulmonary:      Effort: Pulmonary effort is normal. No respiratory distress.      Breath sounds: Normal breath sounds. No wheezing, rhonchi or rales.   Musculoskeletal:      Cervical back: Neck supple.   Skin:     General: Skin is warm and dry.   Neurological:      Mental Status: He is alert and oriented to person, place, and time.      Cranial Nerves: No cranial nerve deficit.      Motor: No weakness.      Coordination: Coordination normal.      Gait: Gait normal.   Psychiatric:         Attention and Perception: Attention normal.         Mood and Affect: Mood is anxious.         Speech: Speech normal.         Behavior: Behavior is cooperative.         Thought Content: Thought content normal.         Cognition and Memory: He exhibits impaired recent memory (some noted).        Result Review :     Common labs          11/11/2022    09:55 4/21/2023    09:44 7/26/2023    10:38   Common Labs   Glucose 90  96  102    BUN 15  17  10    Creatinine 1.12  1.18  0.97    Sodium 142  140  140    Potassium 3.3  3.4  4.4    Chloride 104  103  106    Calcium 9.8  10.1  9.9    Albumin 3.70  3.8  4.4    Total Bilirubin 0.5  0.3  0.5    Alkaline Phosphatase 87  86  82    AST (SGOT) 18  14  12    ALT (SGPT) 15  13  9    WBC 3.77  3.10  3.20    Hemoglobin 12.7  12.6  11.9    Hematocrit 37.5  37.1  34.9    Platelets 254  255  262    Total Cholesterol 184  " 155  143    Triglycerides 86  57  65    HDL Cholesterol 62  54  59    LDL Cholesterol  106  89  71    Hemoglobin A1C  5.50       TSH          11/11/2022    09:55 7/26/2023    10:38   TSH   TSH 2.780  1.370                Assessment and Plan    Diagnoses and all orders for this visit:    1. Memory loss (Primary)  -     MRI Brain Without Contrast; Future    2. Anxiety  -     citalopram (CeleXA) 20 MG tablet; Take 1 tablet by mouth Daily.  Dispense: 30 tablet; Refill: 0  -     busPIRone (BUSPAR) 5 MG tablet; Take 1 tablet by mouth 3 (Three) Times a Day As Needed (anxiety).  Dispense: 90 tablet; Refill: 0    Referral for MRI brain placed.  Will send results to neurology when available.   Keep scheduled appointment with Neurology at [facility] on 10-12-23 as scheduled    Increase citalopram to 20 mg daily--new Rx given  Add Buspar 5 mg TID PRN anxiety--Rx given  Keep scheduled f/u with PCP on 8-25-23 for recheck of above problems/concerns      This document has been electronically signed by JOSE MIGUEL Hanson on August 14, 2023 10:51 CDT,.    I spent 38 minutes caring for German on this date of service. This time includes time spent by me in the following activities:preparing for the visit, reviewing tests, performing a medically appropriate examination and/or evaluation , counseling and educating the patient/family/caregiver, ordering medications, tests, or procedures, and documenting information in the medical record

## 2023-08-15 ENCOUNTER — TELEPHONE (OUTPATIENT)
Dept: FAMILY MEDICINE CLINIC | Facility: CLINIC | Age: 73
End: 2023-08-15
Payer: MEDICARE

## 2023-08-15 NOTE — TELEPHONE ENCOUNTER
I notified Patient to let him know his appt with [facility].  Patient states he is going to take Magnesium today for constipation.  He wanted me to let you know the only way he can have BM is to use Magnesium.

## 2023-08-17 ENCOUNTER — PRIOR AUTHORIZATION (OUTPATIENT)
Dept: FAMILY MEDICINE CLINIC | Facility: CLINIC | Age: 73
End: 2023-08-17
Payer: MEDICARE

## 2023-08-17 ENCOUNTER — TELEPHONE (OUTPATIENT)
Dept: FAMILY MEDICINE CLINIC | Facility: CLINIC | Age: 73
End: 2023-08-17
Payer: MEDICARE

## 2023-08-17 NOTE — TELEPHONE ENCOUNTER
MRI brain w/o contrast dated 8-:     Impression:   There are several small old lacunae in the basal ganglia  The signal change in the subcortical white matter is old injury and due to some combination of old inflammatory change and old microvascular injur.   No acute abnormality is seen and no explanation for the patient's symptoms.     No acute abnormalities noted.  There are some old changes, possibly old TIA, etc, but this has not been recent.      Please fax copy to neurology as patient should have upcoming appointment in [place].

## 2023-08-17 NOTE — TELEPHONE ENCOUNTER
PA received for linzess  Expires 8/15/24  Authorization faxed to pharmacy   Copy given to pt as well to take to pharmacy

## 2023-08-18 ENCOUNTER — TELEPHONE (OUTPATIENT)
Dept: FAMILY MEDICINE CLINIC | Facility: CLINIC | Age: 73
End: 2023-08-18

## 2023-08-18 ENCOUNTER — CLINICAL SUPPORT (OUTPATIENT)
Dept: FAMILY MEDICINE CLINIC | Facility: CLINIC | Age: 73
End: 2023-08-18
Payer: MEDICARE

## 2023-08-18 DIAGNOSIS — R43.2 LOSS OF TASTE: Primary | ICD-10-CM

## 2023-08-18 LAB
EXPIRATION DATE: ABNORMAL
INTERNAL CONTROL: ABNORMAL
Lab: ABNORMAL
SARS-COV-2 AG UPPER RESP QL IA.RAPID: DETECTED

## 2023-08-18 PROCEDURE — 87426 SARSCOV CORONAVIRUS AG IA: CPT | Performed by: FAMILY MEDICINE

## 2023-08-18 NOTE — TELEPHONE ENCOUNTER
Pt tested positive for COVID and I called and left a message on what medications he can take for this illness and what precautions he can take. Pt voiced understanding.

## 2023-08-18 NOTE — PROGRESS NOTES
Pt presented to the office for COVID testing. POCT COVID test on separate documentation for charge.

## 2023-08-18 NOTE — TELEPHONE ENCOUNTER
Patient presented to clinic and notified of results.  MRI results to be faxed to neurology at [facility].

## 2023-08-25 ENCOUNTER — OFFICE VISIT (OUTPATIENT)
Dept: FAMILY MEDICINE CLINIC | Facility: CLINIC | Age: 73
End: 2023-08-25
Payer: MEDICARE

## 2023-08-25 ENCOUNTER — LAB (OUTPATIENT)
Dept: LAB | Facility: HOSPITAL | Age: 73
End: 2023-08-25
Payer: MEDICARE

## 2023-08-25 VITALS
SYSTOLIC BLOOD PRESSURE: 140 MMHG | DIASTOLIC BLOOD PRESSURE: 68 MMHG | WEIGHT: 197.8 LBS | BODY MASS INDEX: 26.21 KG/M2 | HEIGHT: 73 IN | TEMPERATURE: 98 F | OXYGEN SATURATION: 99 % | HEART RATE: 88 BPM

## 2023-08-25 DIAGNOSIS — Z85.46 HISTORY OF PROSTATE CANCER: Primary | ICD-10-CM

## 2023-08-25 DIAGNOSIS — Z87.19 HISTORY OF PROCTITIS: ICD-10-CM

## 2023-08-25 DIAGNOSIS — E55.9 VITAMIN D DEFICIENCY: ICD-10-CM

## 2023-08-25 DIAGNOSIS — K21.9 GASTROESOPHAGEAL REFLUX DISEASE, UNSPECIFIED WHETHER ESOPHAGITIS PRESENT: ICD-10-CM

## 2023-08-25 DIAGNOSIS — R82.6 ABNORMAL URINE LEVELS OF SUBSTANCES CHIEFLY NONMEDICINAL AS TO SOURCE: ICD-10-CM

## 2023-08-25 DIAGNOSIS — R63.4 UNINTENTIONAL WEIGHT LOSS: ICD-10-CM

## 2023-08-25 DIAGNOSIS — R41.3 MEMORY LOSS: ICD-10-CM

## 2023-08-25 DIAGNOSIS — D64.9 NORMOCYTIC ANEMIA: ICD-10-CM

## 2023-08-25 DIAGNOSIS — D50.9 IRON DEFICIENCY ANEMIA, UNSPECIFIED IRON DEFICIENCY ANEMIA TYPE: ICD-10-CM

## 2023-08-25 DIAGNOSIS — Z87.898 HISTORY OF PREDIABETES: ICD-10-CM

## 2023-08-25 DIAGNOSIS — N18.2 STAGE 2 CHRONIC KIDNEY DISEASE: ICD-10-CM

## 2023-08-25 DIAGNOSIS — F33.1 MODERATE EPISODE OF RECURRENT MAJOR DEPRESSIVE DISORDER: ICD-10-CM

## 2023-08-25 DIAGNOSIS — I10 ESSENTIAL HYPERTENSION: ICD-10-CM

## 2023-08-25 DIAGNOSIS — E66.3 OVERWEIGHT: ICD-10-CM

## 2023-08-25 DIAGNOSIS — R63.0 DECREASED APPETITE: ICD-10-CM

## 2023-08-25 DIAGNOSIS — Z85.46 HISTORY OF PROSTATE CANCER: ICD-10-CM

## 2023-08-25 DIAGNOSIS — Z87.19 HISTORY OF GASTRITIS: ICD-10-CM

## 2023-08-25 PROCEDURE — 84153 ASSAY OF PSA TOTAL: CPT

## 2023-08-25 PROCEDURE — 82306 VITAMIN D 25 HYDROXY: CPT

## 2023-08-25 PROCEDURE — G0432 EIA HIV-1/HIV-2 SCREEN: HCPCS

## 2023-08-25 PROCEDURE — 86038 ANTINUCLEAR ANTIBODIES: CPT

## 2023-08-25 PROCEDURE — 82607 VITAMIN B-12: CPT

## 2023-08-25 PROCEDURE — 83540 ASSAY OF IRON: CPT

## 2023-08-25 PROCEDURE — 80053 COMPREHEN METABOLIC PANEL: CPT

## 2023-08-25 PROCEDURE — 86140 C-REACTIVE PROTEIN: CPT

## 2023-08-25 PROCEDURE — 87086 URINE CULTURE/COLONY COUNT: CPT

## 2023-08-25 PROCEDURE — 85025 COMPLETE CBC W/AUTO DIFF WBC: CPT

## 2023-08-25 PROCEDURE — 85652 RBC SED RATE AUTOMATED: CPT

## 2023-08-25 PROCEDURE — 84466 ASSAY OF TRANSFERRIN: CPT

## 2023-08-25 PROCEDURE — 81001 URINALYSIS AUTO W/SCOPE: CPT

## 2023-08-25 RX ORDER — ERGOCALCIFEROL 1.25 MG/1
50000 CAPSULE ORAL
Qty: 12 CAPSULE | Refills: 0 | Status: SHIPPED | OUTPATIENT
Start: 2023-08-25 | End: 2023-08-25 | Stop reason: SDUPTHER

## 2023-08-25 RX ORDER — POTASSIUM CHLORIDE 20 MEQ/1
20 TABLET, EXTENDED RELEASE ORAL 3 TIMES DAILY
Qty: 270 TABLET | Refills: 0 | Status: SHIPPED | OUTPATIENT
Start: 2023-08-25

## 2023-08-25 RX ORDER — SPIRONOLACTONE 25 MG/1
25 TABLET ORAL DAILY
Qty: 30 TABLET | Refills: 3 | Status: SHIPPED | OUTPATIENT
Start: 2023-08-25

## 2023-08-25 RX ORDER — ERGOCALCIFEROL 1.25 MG/1
50000 CAPSULE ORAL
Qty: 12 CAPSULE | Refills: 0 | Status: SHIPPED | OUTPATIENT
Start: 2023-08-25

## 2023-08-25 RX ORDER — DILTIAZEM HYDROCHLORIDE 360 MG/1
360 CAPSULE, EXTENDED RELEASE ORAL DAILY
Qty: 90 CAPSULE | Refills: 0 | Status: SHIPPED | OUTPATIENT
Start: 2023-08-25

## 2023-08-25 RX ORDER — TELMISARTAN 40 MG/1
40 TABLET ORAL DAILY
Qty: 90 TABLET | Refills: 0 | Status: SHIPPED | OUTPATIENT
Start: 2023-08-25

## 2023-08-25 NOTE — PATIENT INSTRUCTIONS
Get labwork today    Will get HIV test    Also will obtain MRI report     Refer to Gastroenterology for repeat endoscopy    Get chest x-ray today

## 2023-08-26 LAB
25(OH)D3 SERPL-MCNC: 88.9 NG/ML (ref 30–100)
ALBUMIN SERPL-MCNC: 4.3 G/DL (ref 3.5–5.2)
ALBUMIN/GLOB SERPL: 1.5 G/DL
ALP SERPL-CCNC: 85 U/L (ref 39–117)
ALT SERPL W P-5'-P-CCNC: 13 U/L (ref 1–41)
ANION GAP SERPL CALCULATED.3IONS-SCNC: 11 MMOL/L (ref 5–15)
AST SERPL-CCNC: 13 U/L (ref 1–40)
BACTERIA SPEC AEROBE CULT: NO GROWTH
BACTERIA UR QL AUTO: NORMAL /HPF
BASOPHILS # BLD AUTO: 0.01 10*3/MM3 (ref 0–0.2)
BASOPHILS NFR BLD AUTO: 0.3 % (ref 0–1.5)
BILIRUB SERPL-MCNC: 0.5 MG/DL (ref 0–1.2)
BILIRUB UR QL STRIP: NEGATIVE
BUN SERPL-MCNC: 13 MG/DL (ref 8–23)
BUN/CREAT SERPL: 14.3 (ref 7–25)
CALCIUM SPEC-SCNC: 9.7 MG/DL (ref 8.6–10.5)
CHLORIDE SERPL-SCNC: 94 MMOL/L (ref 98–107)
CLARITY UR: CLEAR
CO2 SERPL-SCNC: 21 MMOL/L (ref 22–29)
COLOR UR: ABNORMAL
CREAT SERPL-MCNC: 0.91 MG/DL (ref 0.76–1.27)
CRP SERPL-MCNC: <0.3 MG/DL (ref 0–0.5)
DEPRECATED RDW RBC AUTO: 43.4 FL (ref 37–54)
EGFRCR SERPLBLD CKD-EPI 2021: 88.4 ML/MIN/1.73
EOSINOPHIL # BLD AUTO: 0.02 10*3/MM3 (ref 0–0.4)
EOSINOPHIL NFR BLD AUTO: 0.6 % (ref 0.3–6.2)
ERYTHROCYTE [DISTWIDTH] IN BLOOD BY AUTOMATED COUNT: 13.2 % (ref 12.3–15.4)
ERYTHROCYTE [SEDIMENTATION RATE] IN BLOOD: 13 MM/HR (ref 0–20)
GLOBULIN UR ELPH-MCNC: 2.8 GM/DL
GLUCOSE SERPL-MCNC: 110 MG/DL (ref 65–99)
GLUCOSE UR STRIP-MCNC: NEGATIVE MG/DL
HCT VFR BLD AUTO: 35.9 % (ref 37.5–51)
HGB BLD-MCNC: 12.1 G/DL (ref 13–17.7)
HGB UR QL STRIP.AUTO: NEGATIVE
HIV 1+2 AB+HIV1 P24 AG SERPL QL IA: NORMAL
HYALINE CASTS UR QL AUTO: NORMAL /LPF
IMM GRANULOCYTES # BLD AUTO: 0.02 10*3/MM3 (ref 0–0.05)
IMM GRANULOCYTES NFR BLD AUTO: 0.6 % (ref 0–0.5)
IRON 24H UR-MRATE: 60 MCG/DL (ref 59–158)
IRON SATN MFR SERPL: 21 % (ref 20–50)
KETONES UR QL STRIP: ABNORMAL
LEUKOCYTE ESTERASE UR QL STRIP.AUTO: ABNORMAL
LYMPHOCYTES # BLD AUTO: 0.61 10*3/MM3 (ref 0.7–3.1)
LYMPHOCYTES NFR BLD AUTO: 19.3 % (ref 19.6–45.3)
MCH RBC QN AUTO: 30 PG (ref 26.6–33)
MCHC RBC AUTO-ENTMCNC: 33.7 G/DL (ref 31.5–35.7)
MCV RBC AUTO: 88.9 FL (ref 79–97)
MONOCYTES # BLD AUTO: 0.42 10*3/MM3 (ref 0.1–0.9)
MONOCYTES NFR BLD AUTO: 13.3 % (ref 5–12)
MUCOUS THREADS URNS QL MICRO: NORMAL /HPF
NEUTROPHILS NFR BLD AUTO: 2.08 10*3/MM3 (ref 1.7–7)
NEUTROPHILS NFR BLD AUTO: 65.9 % (ref 42.7–76)
NITRITE UR QL STRIP: NEGATIVE
NRBC BLD AUTO-RTO: 0 /100 WBC (ref 0–0.2)
PH UR STRIP.AUTO: 7 [PH] (ref 5–8)
PLATELET # BLD AUTO: 328 10*3/MM3 (ref 140–450)
PMV BLD AUTO: 9.1 FL (ref 6–12)
POTASSIUM SERPL-SCNC: 4.4 MMOL/L (ref 3.5–5.2)
PROT SERPL-MCNC: 7.1 G/DL (ref 6–8.5)
PROT UR QL STRIP: ABNORMAL
PSA SERPL-MCNC: 0.82 NG/ML (ref 0–4)
RBC # BLD AUTO: 4.04 10*6/MM3 (ref 4.14–5.8)
RBC # UR STRIP: NORMAL /HPF
REF LAB TEST METHOD: NORMAL
SODIUM SERPL-SCNC: 126 MMOL/L (ref 136–145)
SP GR UR STRIP: 1.02 (ref 1–1.03)
SQUAMOUS #/AREA URNS HPF: NORMAL /HPF
TIBC SERPL-MCNC: 289 MCG/DL (ref 298–536)
TRANSFERRIN SERPL-MCNC: 194 MG/DL (ref 200–360)
UROBILINOGEN UR QL STRIP: ABNORMAL
VIT B12 BLD-MCNC: 903 PG/ML (ref 211–946)
WBC # UR STRIP: NORMAL /HPF
WBC NRBC COR # BLD: 3.16 10*3/MM3 (ref 3.4–10.8)

## 2023-08-28 ENCOUNTER — TELEPHONE (OUTPATIENT)
Dept: PODIATRY | Facility: CLINIC | Age: 73
End: 2023-08-28
Payer: MEDICARE

## 2023-08-28 DIAGNOSIS — R93.89 ABNORMAL CHEST X-RAY: ICD-10-CM

## 2023-08-28 DIAGNOSIS — R63.4 UNINTENTIONAL WEIGHT LOSS: Primary | ICD-10-CM

## 2023-08-28 DIAGNOSIS — R63.4 ABNORMAL WEIGHT LOSS: Primary | ICD-10-CM

## 2023-08-28 DIAGNOSIS — D72.821 MONOCYTOSIS: ICD-10-CM

## 2023-08-28 LAB — ANA SER QL: NEGATIVE

## 2023-08-28 NOTE — TELEPHONE ENCOUNTER
Called pt about recent lab work. Pt states that they are taking the aldactone and agree to be referred to go see hematology. Pt also agreed to CT of abdomen with contrast.

## 2023-08-28 NOTE — TELEPHONE ENCOUNTER
----- Message from Abel Hoffman MD sent at 8/27/2023  2:21 AM CDT -----  On CMP glucose at 100 sodium low at 126 along with chloride. It may be due to diuretics. Please ensure pt is only taking aldactone 25 mg daily and not hydrochlorothiazide 50 mg daily.     Kidney functio nstable    Liver function stable     Hemoglobin stable. Monocytes are elevated and recommend pt see Hematology for evaluation.  If pt agreeable with [facility] let me know.  Will need to rule out any possible blood disorder    Rest of labwork stable    Awaiting MOON test     Recheck on next visit

## 2023-08-28 NOTE — TELEPHONE ENCOUNTER
Called pt about recent X-ray results. Pt voiced understanding and agree to a CT with contrast preferably to [facility] if possible due to transportation issues.

## 2023-08-28 NOTE — TELEPHONE ENCOUNTER
----- Message from Abel Hoffman MD sent at 8/27/2023  1:40 AM CDT -----  On chest x-ray there is slightly fullness to pt's ascending thoracic aorta which could be vessel tortuosity. Recommend pt CT of chest with contrast to exclude a thoracic aneurysm. Recommend [facility] and if pt agreeable I will order

## 2023-08-30 DIAGNOSIS — R41.3 MEMORY LOSS: ICD-10-CM

## 2023-09-05 ENCOUNTER — TELEPHONE (OUTPATIENT)
Dept: FAMILY MEDICINE CLINIC | Facility: CLINIC | Age: 73
End: 2023-09-05
Payer: MEDICARE

## 2023-09-05 NOTE — TELEPHONE ENCOUNTER
----- Message from Abel Hoffman MD sent at 9/5/2023  3:44 PM CDT -----  Regarding: CT chest and abdomen with contrast  Please let pt know CT of chest/abdomen  on 9/1/23    Showed chronic thoracic spine findings without any acute or suspicious interval changes, there is no acute abdominal or pelvic finding.      There is prostate enlargement. He will need to followup with his Urologist regarding this    There is diverticulosis in colon without diverticulitis    There is no acute skeletal findings there is sclerotic changes in the right femoral heal which may be old osteonecrosis which is dead bone tissue     Other findings included granuloma of lung in right lung base. This is likely an old area of infection  There is a mild aneurysm in the ascending aorta about 4.1 cm in size. Recommend pt see Vascular Surgery at [facility] for this. I will refer if pt agreeable      Rest of CT is okay    Will discuss on next visit    Highly recommend pt see Gastroenterology for followup on his underlying GI issues    Thanks

## 2023-09-07 ENCOUNTER — TELEPHONE (OUTPATIENT)
Dept: FAMILY MEDICINE CLINIC | Facility: CLINIC | Age: 73
End: 2023-09-07

## 2023-09-07 NOTE — TELEPHONE ENCOUNTER
JOSE MIGUEL Quezada's office called stating patient is already seeing Dr. Ryan (has seen them within last year) and their policy will not allow patient to see Dr. Souza or JOSE MIGUEL Quezada  here, so if he wants to see someone within [facility], he will have to travel to [place] to see an MD or continue to see Dr. Ryan.  Call back number: [phone]

## 2023-09-11 ENCOUNTER — CONSULT (OUTPATIENT)
Dept: ONCOLOGY | Facility: CLINIC | Age: 73
End: 2023-09-11
Payer: MEDICARE

## 2023-09-11 ENCOUNTER — LAB (OUTPATIENT)
Dept: ONCOLOGY | Facility: HOSPITAL | Age: 73
End: 2023-09-11
Payer: MEDICARE

## 2023-09-11 ENCOUNTER — APPOINTMENT (OUTPATIENT)
Dept: ONCOLOGY | Facility: HOSPITAL | Age: 73
End: 2023-09-11
Payer: MEDICARE

## 2023-09-11 VITALS
WEIGHT: 196.4 LBS | SYSTOLIC BLOOD PRESSURE: 126 MMHG | HEART RATE: 79 BPM | BODY MASS INDEX: 26.03 KG/M2 | HEIGHT: 73 IN | OXYGEN SATURATION: 98 % | DIASTOLIC BLOOD PRESSURE: 70 MMHG

## 2023-09-11 DIAGNOSIS — D64.9 NORMOCYTIC ANEMIA: ICD-10-CM

## 2023-09-11 DIAGNOSIS — D72.819 LEUKOPENIA, UNSPECIFIED TYPE: ICD-10-CM

## 2023-09-11 DIAGNOSIS — C61 PROSTATE CANCER: Primary | ICD-10-CM

## 2023-09-11 LAB
BASOPHILS # BLD AUTO: 0.04 10*3/MM3 (ref 0–0.2)
BASOPHILS NFR BLD AUTO: 0.9 % (ref 0–1.5)
DEPRECATED RDW RBC AUTO: 45.8 FL (ref 37–54)
EOSINOPHIL # BLD AUTO: 0.18 10*3/MM3 (ref 0–0.4)
EOSINOPHIL NFR BLD AUTO: 4 % (ref 0.3–6.2)
ERYTHROCYTE [DISTWIDTH] IN BLOOD BY AUTOMATED COUNT: 13.4 % (ref 12.3–15.4)
FERRITIN SERPL-MCNC: 266 NG/ML (ref 30–400)
FOLATE SERPL-MCNC: 7.7 NG/ML (ref 4.78–24.2)
HCT VFR BLD AUTO: 34.9 % (ref 37.5–51)
HGB BLD-MCNC: 11.3 G/DL (ref 13–17.7)
HGB RETIC QN AUTO: 33.5 PG (ref 29.8–36.1)
IMM GRANULOCYTES # BLD AUTO: 0.01 10*3/MM3 (ref 0–0.05)
IMM GRANULOCYTES NFR BLD AUTO: 0.2 % (ref 0–0.5)
IMM RETICS NFR: 5.2 % (ref 3–15.8)
IRON 24H UR-MRATE: 46 MCG/DL (ref 59–158)
IRON SATN MFR SERPL: 16 % (ref 20–50)
LDH SERPL-CCNC: 137 U/L (ref 135–225)
LYMPHOCYTES # BLD AUTO: 0.92 10*3/MM3 (ref 0.7–3.1)
LYMPHOCYTES NFR BLD AUTO: 20.5 % (ref 19.6–45.3)
MCH RBC QN AUTO: 30.3 PG (ref 26.6–33)
MCHC RBC AUTO-ENTMCNC: 32.4 G/DL (ref 31.5–35.7)
MCV RBC AUTO: 93.6 FL (ref 79–97)
MONOCYTES # BLD AUTO: 0.56 10*3/MM3 (ref 0.1–0.9)
MONOCYTES NFR BLD AUTO: 12.5 % (ref 5–12)
NEUTROPHILS NFR BLD AUTO: 2.77 10*3/MM3 (ref 1.7–7)
NEUTROPHILS NFR BLD AUTO: 61.9 % (ref 42.7–76)
NRBC BLD AUTO-RTO: 0 /100 WBC (ref 0–0.2)
PLATELET # BLD AUTO: 268 10*3/MM3 (ref 140–450)
PMV BLD AUTO: 8.6 FL (ref 6–12)
RBC # BLD AUTO: 3.73 10*6/MM3 (ref 4.14–5.8)
RETICS # AUTO: 0.04 10*6/MM3 (ref 0.02–0.13)
RETICS/RBC NFR AUTO: 1.05 % (ref 0.7–1.9)
TIBC SERPL-MCNC: 288 MCG/DL (ref 298–536)
TRANSFERRIN SERPL-MCNC: 193 MG/DL (ref 200–360)
WBC NRBC COR # BLD: 4.48 10*3/MM3 (ref 3.4–10.8)

## 2023-09-11 PROCEDURE — 84207 ASSAY OF VITAMIN B-6: CPT | Performed by: INTERNAL MEDICINE

## 2023-09-11 PROCEDURE — 85025 COMPLETE CBC W/AUTO DIFF WBC: CPT | Performed by: INTERNAL MEDICINE

## 2023-09-11 PROCEDURE — 85046 RETICYTE/HGB CONCENTRATE: CPT | Performed by: INTERNAL MEDICINE

## 2023-09-11 PROCEDURE — 83540 ASSAY OF IRON: CPT | Performed by: INTERNAL MEDICINE

## 2023-09-11 PROCEDURE — 84630 ASSAY OF ZINC: CPT | Performed by: INTERNAL MEDICINE

## 2023-09-11 PROCEDURE — G0463 HOSPITAL OUTPT CLINIC VISIT: HCPCS | Performed by: INTERNAL MEDICINE

## 2023-09-11 PROCEDURE — 84466 ASSAY OF TRANSFERRIN: CPT | Performed by: INTERNAL MEDICINE

## 2023-09-11 PROCEDURE — 82746 ASSAY OF FOLIC ACID SERUM: CPT | Performed by: INTERNAL MEDICINE

## 2023-09-11 PROCEDURE — 82525 ASSAY OF COPPER: CPT | Performed by: INTERNAL MEDICINE

## 2023-09-11 PROCEDURE — 82728 ASSAY OF FERRITIN: CPT | Performed by: INTERNAL MEDICINE

## 2023-09-11 PROCEDURE — 83615 LACTATE (LD) (LDH) ENZYME: CPT | Performed by: INTERNAL MEDICINE

## 2023-09-11 NOTE — PROGRESS NOTES
DATE OF CONSULT: 9/11/2023    REQUESTING SOURCE:  Abel Hoffman MD  [address]  [place] [address]       REASON FOR CONSULTATION: Leukopenia, anemia, history of prostate cancer      HISTORY OF PRESENT ILLNESS:    72-year-old male with medical problem consisting of hypertension, dyslipidemia, history of prostate cancer diagnosed around 18 years ago for which she has received radiation treatment in [place] under care of Dr. Gan, has been referred to [facility] for further evaluation and recommendation regarding leukopenia, anemia and history of prostate cancer.  Patient complains of intermittent blood in stool when he wipes.  Admits to 20 pound of unintentional weight loss recently in the last few months.  Complains of constipation which is got worse with iron tablet 3 times a day that he has been taking for the last 2 to 3 years.  Complains of arthritis.  Complains of numbness affecting lower extremity.  Denies any new lymph node enlargement.  Denies any fevers.  Denies any drenching night sweats.  Complains of shortness of breath with exertion.            PAST MEDICAL HISTORY:    Past Medical History:   Diagnosis Date    Anemia     Depression     Major, single episode, unspecified    Diverticular disease of colon     Encounter for screening for diabetes mellitus     Encounter for screening for endocrine disorder     Suspected    Hemorrhage of anus and rectum     Hyperlipidemia     Hypertension     Hypokalemia     Primary malignant neoplasm of prostate     prostate    Radiation proctitis     Vitamin D deficiency        PAST SURGICAL HISTORY:  Past Surgical History:   Procedure Laterality Date    APPENDECTOMY      CATARACT EXTRACTION Bilateral 2013    COLONOSCOPY      diverticulosis in hte sigmoid colon. Altered vascular mucosa in hte rectum. hemorrhoids, no specimens collected, radation proctitis    COLONOSCOPY N/A 10/19/2017    Procedure: COLONOSCOPY;  Surgeon: Patric Wooten MD;   Location: [facility] ENDOSCOPY;  Service:        ALLERGIES:    No Known Allergies    SOCIAL HISTORY:   Social History     Tobacco Use    Smoking status: Never    Smokeless tobacco: Never   Vaping Use    Vaping Use: Never used   Substance Use Topics    Alcohol use: Yes     Comment: 08/30/2018 - Patient reports occasional consumption of alcoholic beverages.    Drug use: No       CURRENT MEDICATIONS:    Current Outpatient Medications   Medication Sig Dispense Refill    atorvastatin (Lipitor) 20 MG tablet Take 1 tablet by mouth Every Night. 90 tablet 0    busPIRone (BUSPAR) 5 MG tablet Take 1 tablet by mouth 3 (Three) Times a Day As Needed (anxiety). 90 tablet 0    calcium carbonate-vitamin d 600-400 MG-UNIT per tablet Take 1 tablet by mouth Daily. 90 tablet 1    citalopram (CeleXA) 20 MG tablet Take 1 tablet by mouth Daily. (Patient taking differently: Take 0.5 tablets by mouth Daily.) 30 tablet 0    dilTIAZem (TIAZAC) 360 MG 24 hr capsule Take 1 capsule by mouth Daily. 90 capsule 0    doxazosin (CARDURA) 8 MG tablet Take 1 tablet by mouth Every Night. 90 tablet 3    ferrous sulfate 325 (65 Fe) MG tablet Take 1 tablet by mouth 3 (Three) Times a Day With Meals. 270 tablet 0    linaclotide (Linzess) 72 MCG capsule capsule Take 1 capsule by mouth Every Morning Before Breakfast. 30 capsule 2    potassium chloride (K-DUR,KLOR-CON) 20 MEQ CR tablet Take 1 tablet by mouth 3 (Three) Times a Day. 270 tablet 0    pravastatin (PRAVACHOL) 80 MG tablet Take 1 tablet by mouth Daily.      spironolactone (Aldactone) 25 MG tablet Take 1 tablet by mouth Daily. 30 tablet 3    telmisartan (MICARDIS) 40 MG tablet Take 1 tablet by mouth Daily. 90 tablet 0    Viagra 100 MG tablet Take 1 tablet by mouth Daily As Needed for Erectile Dysfunction. 10 tablet 3    vitamin D (ERGOCALCIFEROL) 1.25 MG (16992 UT) capsule capsule Take 1 capsule by mouth Every 7 (Seven) Days. 12 capsule 0     No current facility-administered medications for this visit.         HOME MEDICATIONS:   Current Outpatient Medications on File Prior to Visit   Medication Sig Dispense Refill    atorvastatin (Lipitor) 20 MG tablet Take 1 tablet by mouth Every Night. 90 tablet 0    busPIRone (BUSPAR) 5 MG tablet Take 1 tablet by mouth 3 (Three) Times a Day As Needed (anxiety). 90 tablet 0    calcium carbonate-vitamin d 600-400 MG-UNIT per tablet Take 1 tablet by mouth Daily. 90 tablet 1    citalopram (CeleXA) 20 MG tablet Take 1 tablet by mouth Daily. (Patient taking differently: Take 0.5 tablets by mouth Daily.) 30 tablet 0    dilTIAZem (TIAZAC) 360 MG 24 hr capsule Take 1 capsule by mouth Daily. 90 capsule 0    doxazosin (CARDURA) 8 MG tablet Take 1 tablet by mouth Every Night. 90 tablet 3    ferrous sulfate 325 (65 Fe) MG tablet Take 1 tablet by mouth 3 (Three) Times a Day With Meals. 270 tablet 0    linaclotide (Linzess) 72 MCG capsule capsule Take 1 capsule by mouth Every Morning Before Breakfast. 30 capsule 2    potassium chloride (K-DUR,KLOR-CON) 20 MEQ CR tablet Take 1 tablet by mouth 3 (Three) Times a Day. 270 tablet 0    pravastatin (PRAVACHOL) 80 MG tablet Take 1 tablet by mouth Daily.      spironolactone (Aldactone) 25 MG tablet Take 1 tablet by mouth Daily. 30 tablet 3    telmisartan (MICARDIS) 40 MG tablet Take 1 tablet by mouth Daily. 90 tablet 0    Viagra 100 MG tablet Take 1 tablet by mouth Daily As Needed for Erectile Dysfunction. 10 tablet 3    vitamin D (ERGOCALCIFEROL) 1.25 MG (15288 UT) capsule capsule Take 1 capsule by mouth Every 7 (Seven) Days. 12 capsule 0     No current facility-administered medications on file prior to visit.       FAMILY HISTORY:    Family History   Problem Relation Age of Onset    Cancer Mother     Hypertension Brother          Review of Systems   Constitutional:  Positive for fatigue and unexpected weight change (20 pound of weight loss in last few months).   Respiratory:  Positive for shortness of breath.    Gastrointestinal:  Positive for blood in  "stool and constipation.   Musculoskeletal:  Positive for arthralgias and back pain.   Neurological:  Positive for numbness.    A comprehensive 14 point review of systems was performed and was negative except as mentioned.      OBJECTIVEBEGIN@     Vitals:    09/11/23 1035   BP: 126/70   Pulse: 79   SpO2: 98%   Weight: 89.1 kg (196 lb 6.4 oz)   Height: 185.4 cm (73\")   PainSc: 0-No pain          No data to display                Physical Exam  Cardiovascular:      Rate and Rhythm: Normal rate and regular rhythm.   Pulmonary:      Breath sounds: Normal breath sounds.   Neurological:      Mental Status: He is alert and oriented to person, place, and time.         DIAGNOSTIC DATA:    WBC   Date Value Ref Range Status   08/25/2023 3.16 (L) 3.40 - 10.80 10*3/mm3 Final     RBC   Date Value Ref Range Status   08/25/2023 4.04 (L) 4.14 - 5.80 10*6/mm3 Final     Hemoglobin   Date Value Ref Range Status   08/25/2023 12.1 (L) 13.0 - 17.7 g/dL Final     Hematocrit   Date Value Ref Range Status   08/25/2023 35.9 (L) 37.5 - 51.0 % Final     MCV   Date Value Ref Range Status   08/25/2023 88.9 79.0 - 97.0 fL Final     MCH   Date Value Ref Range Status   08/25/2023 30.0 26.6 - 33.0 pg Final     MCHC   Date Value Ref Range Status   08/25/2023 33.7 31.5 - 35.7 g/dL Final     RDW   Date Value Ref Range Status   08/25/2023 13.2 12.3 - 15.4 % Final     RDW-SD   Date Value Ref Range Status   08/25/2023 43.4 37.0 - 54.0 fl Final     MPV   Date Value Ref Range Status   08/25/2023 9.1 6.0 - 12.0 fL Final     Platelets   Date Value Ref Range Status   08/25/2023 328 140 - 450 10*3/mm3 Final     Neutrophil %   Date Value Ref Range Status   08/25/2023 65.9 42.7 - 76.0 % Final     Lymphocyte %   Date Value Ref Range Status   08/25/2023 19.3 (L) 19.6 - 45.3 % Final     Monocyte %   Date Value Ref Range Status   08/25/2023 13.3 (H) 5.0 - 12.0 % Final     Eosinophil %   Date Value Ref Range Status   08/25/2023 0.6 0.3 - 6.2 % Final     Basophil % "   Date Value Ref Range Status   08/25/2023 0.3 0.0 - 1.5 % Final     Immature Grans %   Date Value Ref Range Status   08/25/2023 0.6 (H) 0.0 - 0.5 % Final     Neutrophils, Absolute   Date Value Ref Range Status   08/25/2023 2.08 1.70 - 7.00 10*3/mm3 Final     Lymphocytes, Absolute   Date Value Ref Range Status   08/25/2023 0.61 (L) 0.70 - 3.10 10*3/mm3 Final     Monocytes, Absolute   Date Value Ref Range Status   08/25/2023 0.42 0.10 - 0.90 10*3/mm3 Final     Eosinophils, Absolute   Date Value Ref Range Status   08/25/2023 0.02 0.00 - 0.40 10*3/mm3 Final     Basophils, Absolute   Date Value Ref Range Status   08/25/2023 0.01 0.00 - 0.20 10*3/mm3 Final     Immature Grans, Absolute   Date Value Ref Range Status   08/25/2023 0.02 0.00 - 0.05 10*3/mm3 Final     nRBC   Date Value Ref Range Status   08/25/2023 0.0 0.0 - 0.2 /100 WBC Final     Glucose   Date Value Ref Range Status   08/25/2023 110 (H) 65 - 99 mg/dL Final     Sodium   Date Value Ref Range Status   08/25/2023 126 (L) 136 - 145 mmol/L Final     Potassium   Date Value Ref Range Status   08/25/2023 4.4 3.5 - 5.2 mmol/L Final     CO2   Date Value Ref Range Status   08/25/2023 21.0 (L) 22.0 - 29.0 mmol/L Final     Chloride   Date Value Ref Range Status   08/25/2023 94 (L) 98 - 107 mmol/L Final     Anion Gap   Date Value Ref Range Status   08/25/2023 11.0 5.0 - 15.0 mmol/L Final     Creatinine   Date Value Ref Range Status   08/25/2023 0.91 0.76 - 1.27 mg/dL Final     BUN   Date Value Ref Range Status   08/25/2023 13 8 - 23 mg/dL Final     BUN/Creatinine Ratio   Date Value Ref Range Status   08/25/2023 14.3 7.0 - 25.0 Final     Calcium   Date Value Ref Range Status   08/25/2023 9.7 8.6 - 10.5 mg/dL Final     Alkaline Phosphatase   Date Value Ref Range Status   08/25/2023 85 39 - 117 U/L Final     Total Protein   Date Value Ref Range Status   08/25/2023 7.1 6.0 - 8.5 g/dL Final     ALT (SGPT)   Date Value Ref Range Status   08/25/2023 13 1 - 41 U/L Final     AST  (SGOT)   Date Value Ref Range Status   08/25/2023 13 1 - 40 U/L Final     Total Bilirubin   Date Value Ref Range Status   08/25/2023 0.5 0.0 - 1.2 mg/dL Final     Albumin   Date Value Ref Range Status   08/25/2023 4.3 3.5 - 5.2 g/dL Final     Globulin   Date Value Ref Range Status   08/25/2023 2.8 gm/dL Final     Lab Results   Component Value Date    IRON 60 08/25/2023    TIBC 289 (L) 08/25/2023    LABIRON 21 08/25/2023    FERRITIN 239.00 08/11/2022    [id number] 903 08/25/2023    FOLATE 16.20 04/21/2023     No results found for: , LABCA2, AFPTM, HCGQUANT, , CHROMGRNA, 4MWDP40QLT, CEA, REFLABREPO]            Hepatitis panel, acute in March 2017 showed    Specimen Information: Blood   0 Result Notes       1  Topic       Component  Ref Range & Units 6 yr ago  (3/2/17) 6 yr ago  (10/20/16)   Hepatitis C Ab  Negative Negative    Hep A IgM  Negative Negative Negative   Hep B C IgM  Negative Negative Negative   Hepatitis B Surface Ag  Negative Negative Negative   Resulting Agency [facility]              Specimen Collected: 03/02/17 10:37 CST Last Resulted: 03/03/17 07:55 CST           Radiology Data :  No radiology results for the last 7 days    Pathology :  * Cannot find OR log *    Assessment and plan:    1.  Prostate adenocarcinoma:  - Patient states he was diagnosed with prostate adenocarcinoma about 8 years ago and received radiation treatment by itself in [place] under care of Dr. Gan.  Denies any treatment with androgen deprivation treatment  - Most recent PSA on August 25 was 0.817.  - Recommend monitoring with periodic PSA which we will check around November 2023.  - We will try to obtain records from [place] regarding pathology report as well as radiation treatment prior to next clinic visit in 1 week.    2.  Leukopenia:  - Patient has chronic intermittent leukopenia for last 2 to 3 years.  - Patient also has intermittent neutropenia with leukopenia  - We will recheck blood  work today consisting of CBC with differential, peripheral blood flow cytometry, folate, iron studies, ferritin, B6, copper and zinc.  - We will have patient return to clinic in 1 week to go over the results and further recommendation.    3.  Anemia:  - Hemoglobin is 12.1.  - Patient is currently on iron tablet 3 times a week.  - We will repeat anemia work-up today consisting of iron studies, ferritin, folate, copper, B6 and zinc.  -Since patient has received radiation treatment in past, it was discussed with patient if hemoglobin does not improve without any nutritional deficiency he may need bone marrow biopsy in future  - Due to recurrent intermittent leukopenia we will try to obtain CT of abdomen and pelvis as well as CT of chest with contrast done at [place] prior to next clinic visit in 1 week.    4.  Hypertension    5.  Blood in stool  - Since patient is complaining of blood in stool recommend following up with gastroenterologist in [place] in view of his abnormal weight loss as well to rule out any developing colonic pathology.    6.  Health maintenance: Patient does not smoke.  Had a colonoscopy in last 5 years in [place]    7.  Advance care planning:  - Patient has a living will.  We do not have any copy available, copy has been requested today        PHQ-9 Total Score: 0   -Patient is not homicidal or suicidal.  No acute intervention required.       German Ray reports a pain score of 0.  Given his pain assessment as noted, treatment options were discussed and the following options were decided upon as a follow-up plan to address the patient's pain: continuation of current treatment plan for pain.             Thank you for this consultation.    Deyvi Meyers MD  9/11/2023  11:12 CDT        Part of this note may be an electronic transcription/translation of spoken language to printed text using the Dragon Dictation System.

## 2023-09-12 LAB — REF LAB TEST METHOD: NORMAL

## 2023-09-14 LAB — PYRIDOXAL PHOS SERPL-MCNC: 9.6 UG/L (ref 3.4–65.2)

## 2023-09-15 LAB
COPPER SERPL-MCNC: 133 UG/DL (ref 69–132)
ZINC SERPL-MCNC: 64 UG/DL (ref 44–115)

## 2023-09-18 ENCOUNTER — OFFICE VISIT (OUTPATIENT)
Dept: ONCOLOGY | Facility: CLINIC | Age: 73
End: 2023-09-18
Payer: MEDICARE

## 2023-09-18 VITALS
WEIGHT: 196.6 LBS | DIASTOLIC BLOOD PRESSURE: 69 MMHG | BODY MASS INDEX: 26.06 KG/M2 | HEIGHT: 73 IN | OXYGEN SATURATION: 99 % | HEART RATE: 71 BPM | SYSTOLIC BLOOD PRESSURE: 124 MMHG

## 2023-09-18 DIAGNOSIS — D64.9 NORMOCYTIC ANEMIA: ICD-10-CM

## 2023-09-18 DIAGNOSIS — C61 PROSTATE CANCER: Primary | ICD-10-CM

## 2023-09-18 DIAGNOSIS — D72.819 LEUKOPENIA, UNSPECIFIED TYPE: ICD-10-CM

## 2023-09-18 PROBLEM — K90.9 IRON MALABSORPTION: Status: ACTIVE | Noted: 2023-09-18

## 2023-09-18 PROCEDURE — 3078F DIAST BP <80 MM HG: CPT | Performed by: INTERNAL MEDICINE

## 2023-09-18 PROCEDURE — 3074F SYST BP LT 130 MM HG: CPT | Performed by: INTERNAL MEDICINE

## 2023-09-18 PROCEDURE — G0463 HOSPITAL OUTPT CLINIC VISIT: HCPCS | Performed by: INTERNAL MEDICINE

## 2023-09-18 PROCEDURE — 1157F ADVNC CARE PLAN IN RCRD: CPT | Performed by: INTERNAL MEDICINE

## 2023-09-18 PROCEDURE — 99214 OFFICE O/P EST MOD 30 MIN: CPT | Performed by: INTERNAL MEDICINE

## 2023-09-18 PROCEDURE — 1126F AMNT PAIN NOTED NONE PRSNT: CPT | Performed by: INTERNAL MEDICINE

## 2023-09-18 RX ORDER — SODIUM CHLORIDE 9 MG/ML
250 INJECTION, SOLUTION INTRAVENOUS ONCE
OUTPATIENT
Start: 2023-09-25 | End: 2023-09-25

## 2023-09-18 RX ORDER — DIPHENHYDRAMINE HYDROCHLORIDE 50 MG/ML
50 INJECTION INTRAMUSCULAR; INTRAVENOUS AS NEEDED
OUTPATIENT
Start: 2023-09-25

## 2023-09-18 RX ORDER — DIPHENHYDRAMINE HCL 25 MG
25 CAPSULE ORAL ONCE
OUTPATIENT
Start: 2023-09-25

## 2023-09-18 RX ORDER — SILDENAFIL CITRATE 100 MG
100 TABLET ORAL DAILY PRN
Qty: 10 TABLET | Refills: 3 | Status: SHIPPED | OUTPATIENT
Start: 2023-09-18

## 2023-09-18 RX ORDER — ACETAMINOPHEN 325 MG/1
650 TABLET ORAL ONCE
OUTPATIENT
Start: 2023-09-25

## 2023-09-18 RX ORDER — ATORVASTATIN CALCIUM 20 MG/1
20 TABLET, FILM COATED ORAL NIGHTLY
Qty: 90 TABLET | Refills: 0 | Status: SHIPPED | OUTPATIENT
Start: 2023-09-18

## 2023-09-18 NOTE — PROGRESS NOTES
"DATE OF VISIT: 9/18/2023      REASON FOR VISIT: Anemia, prostate cancer, leukopenia      HISTORY OF PRESENT ILLNESS:   72-year-old male with medical problem consisting of hypertension, dyslipidemia, history of prostate cancer diagnosed around 8 years ago in [place] for which she received radiation treatment under care of Dr. Gan who was initially seen in consultation on September 11, 2023 for evaluation of leukopenia, anemia and history of prostate cancer.  Patient is here for follow-up appointment today to discuss recently done blood work as well as go over result of CT scan that was done at [facility].  Complains of arthritis.  Complains of chronic numbness affecting lower extremity.  Denies any fevers.  Denies any drenching night sweats.  Denies any new lymph node enlargement.            Past Medical History, Past Surgical History, Social History, Family History have been reviewed and are without significant changes except as mentioned.    Review of Systems   A comprehensive 14 point review of systems was performed and was negative except as mentioned in HPI.    Medications:  The current medication list was reviewed in the EMR    ALLERGIES:  No Known Allergies    Objective      Vitals:    09/18/23 1300   BP: 124/69   Pulse: 71   SpO2: 99%   Weight: 89.2 kg (196 lb 9.6 oz)   Height: 185.4 cm (73\")   PainSc: 0-No pain          No data to display                Physical Exam  Pulmonary:      Breath sounds: Normal breath sounds.   Neurological:      Mental Status: He is alert and oriented to person, place, and time.         RECENT LABS:  Glucose   Date Value Ref Range Status   08/25/2023 110 (H) 65 - 99 mg/dL Final     Sodium   Date Value Ref Range Status   08/25/2023 126 (L) 136 - 145 mmol/L Final     Potassium   Date Value Ref Range Status   08/25/2023 4.4 3.5 - 5.2 mmol/L Final     CO2   Date Value Ref Range Status   08/25/2023 21.0 (L) 22.0 - 29.0 mmol/L Final     Chloride   Date Value " Ref Range Status   08/25/2023 94 (L) 98 - 107 mmol/L Final     Anion Gap   Date Value Ref Range Status   08/25/2023 11.0 5.0 - 15.0 mmol/L Final     Creatinine   Date Value Ref Range Status   08/25/2023 0.91 0.76 - 1.27 mg/dL Final     BUN   Date Value Ref Range Status   08/25/2023 13 8 - 23 mg/dL Final     BUN/Creatinine Ratio   Date Value Ref Range Status   08/25/2023 14.3 7.0 - 25.0 Final     Calcium   Date Value Ref Range Status   08/25/2023 9.7 8.6 - 10.5 mg/dL Final     Alkaline Phosphatase   Date Value Ref Range Status   08/25/2023 85 39 - 117 U/L Final     Total Protein   Date Value Ref Range Status   08/25/2023 7.1 6.0 - 8.5 g/dL Final     ALT (SGPT)   Date Value Ref Range Status   08/25/2023 13 1 - 41 U/L Final     AST (SGOT)   Date Value Ref Range Status   08/25/2023 13 1 - 40 U/L Final     Total Bilirubin   Date Value Ref Range Status   08/25/2023 0.5 0.0 - 1.2 mg/dL Final     Albumin   Date Value Ref Range Status   08/25/2023 4.3 3.5 - 5.2 g/dL Final     Globulin   Date Value Ref Range Status   08/25/2023 2.8 gm/dL Final     Lab Results   Component Value Date    WBC 4.48 09/11/2023    HGB 11.3 (L) 09/11/2023    HCT 34.9 (L) 09/11/2023    MCV 93.6 09/11/2023     09/11/2023     Lab Results   Component Value Date    NEUTROABS 2.77 09/11/2023    IRON 46 (L) 09/11/2023    IRON 60 08/25/2023    IRON 50 (L) 04/21/2023    TIBC 288 (L) 09/11/2023    TIBC 289 (L) 08/25/2023    TIBC 316 04/21/2023    LABIRON 16 (L) 09/11/2023    LABIRON 21 08/25/2023    LABIRON 16 (L) 04/21/2023    FERRITIN 266.00 09/11/2023    FERRITIN 239.00 08/11/2022    FERRITIN 264.00 10/12/2021    [id number] 903 08/25/2023    UYLHGZAX38 552 11/11/2022    KBMYGHWM65 506 08/11/2022    FOLATE 7.70 09/11/2023    FOLATE 16.20 04/21/2023    FOLATE 12.00 07/24/2017     Lab Results   Component Value Date    REFLABREPO  09/11/2023     [facility]  [address] [place] [address]  Phone: [phone] or  [phone]  Fax: [phone]  Giacomo Dyer M.D., Medical Director    PATIENT NAME                           LABORATORY NO.  NADIRA MUNOZ.              [id number]  [id number]                         AGE              SEX  SSN           CLIENT REF #  [facility]           72      1950  DAVID    [id number]   [id number]    [place]                       REQUESTING M.D.     ATTENDING M.D.     COPY TO[address]                 [place] [address]             DATE COLLECTED      DATE RECEIVED      DATE REPORTED  2023    DIAGNOSIS:  FLOW CYTOMETRY, PERIPHERAL BLOOD:  Interpretation: No immunophenotypic abnormalities identified.      CLINICAL HISTORY:  Leukopenia, unspecified type    SPECIMENS RECEIVED:  FLOW CYTOMETRY, PERIPHERAL BLOOD    MICROSCOPIC DESCRIPTION:  Flow Cytometry only.    FLOW CYTOMETRY DATA:  Flow cytometry shows a cellular specimen with mixed peripheral blood elements  and a viability of 98.6%. Lymphocytes comprise 31.8% of events. T-cells  (18.1%) show preserved expression of pan-T-cell antigens and have a CD4 to  CD8 ratio of 1.1:1. B-cells (2.1%) are polyclonal with a kappa to lambda ratio of  2.4:1. No significant CD34 positive blast population is identified. The remaining  events are appropriately mature granulocytes and monocytes.    The following antibodies were evaluated by flow cytometry: CD2, CD3, CD4,  CD5, CD7, CD8, CD10, CD13, CD14, CD15, CD16, CD19, CD20, CD33,  CD34, CD38, CD45, CD56, CD57, , , HLA-DR, Kappa, and  Lambda. These tests use analyte specific reagents. The performance of these  analyte specific reagents was determined by [facility]  (see above for address). These tests have not been cleared or approved by the  U.S. Food & Drug Administration (FDA). The FDA has determined that such  approval is not  necessary. These tests are used for clinical purposes and should  not be considered experimental or research.    Professional interpretation rendered by Stanley Berkowitz M.D., ROBERTO at [facility], [address] [place] [address].    GROSS DESCRIPTION:  RECEIVED 1 PT TUBE AND 2 GT TUBES, ALL TUBES KEPT AT PCL  FOR FLOW - MG 9/11/2023    REVIEWED, DIAGNOSED AND ELECTRONICALLY  SIGNED BY:    Stanley Berkowitz M.D., NEISHA.  CPT CODES:  2FLP           PATHOLOGY:  * Cannot find OR log *         RADIOLOGY DATA :  No radiology results for the last 7 days        Assessment & Plan     1.  Prostate cancer:  - As per urology note from [facility].  Patient was diagnosed with prostate cancer in 2015 it was T1CN0 lesion, Reva score was 7.  - PSA at the time of diagnosis was 9.4.  - Patient was evaluated by Dr. Gan and received radiation treatment:  - Most recent PSA done on August 25 0.817.  - CT of chest abdomen and pelvis with contrast done at [facility] on September 1, 2023 does not show any evidence of metastasis.  - We will have patient return to clinic in 3 months with repeat CBC, CMP, iron studies, ferritin, B12, folate, PSA to be done on that day.    2.  Anemia:  - Hemoglobin is 11.3.  - Iron studies are showing persistent iron deficiency despite of patient taking iron tablet by mouth.  Patient is currently taking iron tablet 3 times a day.  Recommend discontinuing iron tablet from today.  -Patient has iron malabsorption.  Recommend starting intravenous Injectafer starting next week.  Recommend starting folic acid p.o. daily      3.  Leukopenia:  - Patient has a chronic intermittent leukopenia  - White blood cell count on September 11, 2023 was normal at 4.48 with ANC of 2.77.  Flow cytometry on September 11, 2023 did not show any evidence of immunophenotypic abnormality.  - Hepatitis profile has been negative in past  - Recommend starting vitamin B6 and folic acid p.o.  daily.  Prescription for B6 and folic acid has been sent to patient's pharmacy today  CT of chest abdomen and pelvis with contrast on September 1, 2023 does not show any evidence of hepatomegaly or splenomegaly.    4.  Hypertension    5.  Blood in stool:  - Likely possibly secondary to radiation proctitis.  Recommend following up with gastroenterology in [place].    6.  Health maintenance: Patient does not smoke.  Had a colonoscopy in last 5 years in [place]    7.  Advance care planning:  - Patient has a living will.  Copy has been requested.                       PHQ-9 Total Score: 1   -Patient is not homicidal or suicidal.  No acute intervention required.     German Ray reports a pain score of 0.  Given his pain assessment as noted, treatment options were discussed and the following options were decided upon as a follow-up plan to address the patient's pain: continuation of current treatment plan for pain.         Deyvi Meyers MD  9/18/2023  17:15 CDT        Part of this note may be an electronic transcription/translation of spoken language to printed text using the Dragon Dictation System.          CC:

## 2023-09-18 NOTE — LETTER
September 18, 2023       No Recipients    Patient: German Ray   YOB: 1950   Date of Visit: 9/18/2023       Dear Abel Hoffman MD,    German Ray was in my office today. Below are the relevant portions of my assessment and plan of care.           If you have questions, please do not hesitate to call me. I look forward to following German along with you.         Sincerely,        Deyvi Meyers MD        CC:   No Recipients

## 2023-09-26 ENCOUNTER — INFUSION (OUTPATIENT)
Dept: ONCOLOGY | Facility: HOSPITAL | Age: 73
End: 2023-09-26
Payer: MEDICARE

## 2023-09-26 VITALS
HEART RATE: 80 BPM | TEMPERATURE: 97.7 F | DIASTOLIC BLOOD PRESSURE: 66 MMHG | RESPIRATION RATE: 18 BRPM | SYSTOLIC BLOOD PRESSURE: 123 MMHG

## 2023-09-26 DIAGNOSIS — K90.9 IRON MALABSORPTION: Primary | ICD-10-CM

## 2023-09-26 DIAGNOSIS — D50.9 IRON DEFICIENCY ANEMIA, UNSPECIFIED IRON DEFICIENCY ANEMIA TYPE: ICD-10-CM

## 2023-09-26 PROCEDURE — 63710000001 ACETAMINOPHEN 325 MG TABLET: Performed by: INTERNAL MEDICINE

## 2023-09-26 PROCEDURE — 96374 THER/PROPH/DIAG INJ IV PUSH: CPT | Performed by: INTERNAL MEDICINE

## 2023-09-26 PROCEDURE — A9270 NON-COVERED ITEM OR SERVICE: HCPCS | Performed by: INTERNAL MEDICINE

## 2023-09-26 PROCEDURE — 25010000002 FERRIC CARBOXYMALTOSE 750 MG/15ML SOLUTION 15 ML VIAL: Performed by: INTERNAL MEDICINE

## 2023-09-26 PROCEDURE — 63710000001 DIPHENHYDRAMINE PER 50 MG: Performed by: INTERNAL MEDICINE

## 2023-09-26 RX ORDER — SODIUM CHLORIDE 9 MG/ML
250 INJECTION, SOLUTION INTRAVENOUS ONCE
Status: COMPLETED | OUTPATIENT
Start: 2023-09-26 | End: 2023-09-26

## 2023-09-26 RX ORDER — METHYLPREDNISOLONE SODIUM SUCCINATE 125 MG/2ML
125 INJECTION, POWDER, LYOPHILIZED, FOR SOLUTION INTRAMUSCULAR; INTRAVENOUS AS NEEDED
OUTPATIENT
Start: 2023-10-03

## 2023-09-26 RX ORDER — DIPHENHYDRAMINE HYDROCHLORIDE 50 MG/ML
50 INJECTION INTRAMUSCULAR; INTRAVENOUS AS NEEDED
OUTPATIENT
Start: 2023-10-03

## 2023-09-26 RX ORDER — DIPHENHYDRAMINE HCL 25 MG
25 CAPSULE ORAL ONCE
Status: COMPLETED | OUTPATIENT
Start: 2023-09-26 | End: 2023-09-26

## 2023-09-26 RX ORDER — ACETAMINOPHEN 325 MG/1
650 TABLET ORAL ONCE
OUTPATIENT
Start: 2023-10-03

## 2023-09-26 RX ORDER — METHYLPREDNISOLONE SODIUM SUCCINATE 125 MG/2ML
125 INJECTION, POWDER, LYOPHILIZED, FOR SOLUTION INTRAMUSCULAR; INTRAVENOUS AS NEEDED
Status: DISCONTINUED | OUTPATIENT
Start: 2023-09-26 | End: 2023-09-26 | Stop reason: HOSPADM

## 2023-09-26 RX ORDER — SODIUM CHLORIDE 9 MG/ML
250 INJECTION, SOLUTION INTRAVENOUS ONCE
OUTPATIENT
Start: 2023-10-03 | End: 2023-10-03

## 2023-09-26 RX ORDER — DIPHENHYDRAMINE HCL 25 MG
25 CAPSULE ORAL ONCE
OUTPATIENT
Start: 2023-10-03

## 2023-09-26 RX ORDER — DIPHENHYDRAMINE HYDROCHLORIDE 50 MG/ML
50 INJECTION INTRAMUSCULAR; INTRAVENOUS AS NEEDED
Status: DISCONTINUED | OUTPATIENT
Start: 2023-09-26 | End: 2023-09-26 | Stop reason: HOSPADM

## 2023-09-26 RX ORDER — ACETAMINOPHEN 325 MG/1
650 TABLET ORAL ONCE
Status: COMPLETED | OUTPATIENT
Start: 2023-09-26 | End: 2023-09-26

## 2023-09-26 RX ADMIN — FERRIC CARBOXYMALTOSE INJECTION 750 MG: 50 INJECTION, SOLUTION INTRAVENOUS at 13:27

## 2023-09-26 RX ADMIN — DIPHENHYDRAMINE HYDROCHLORIDE 25 MG: 25 CAPSULE ORAL at 12:50

## 2023-09-26 RX ADMIN — ACETAMINOPHEN 650 MG: 325 TABLET, FILM COATED ORAL at 12:50

## 2023-09-26 RX ADMIN — SODIUM CHLORIDE 250 ML: 9 INJECTION, SOLUTION INTRAVENOUS at 12:50
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
## 2025-02-13 ENCOUNTER — TELEPHONE (OUTPATIENT)
Dept: PSYCHIATRY | Facility: CLINIC | Age: 75
End: 2025-02-13
Payer: MEDICARE

## 2025-02-13 NOTE — TELEPHONE ENCOUNTER
Received a referral from [facility] for an appointment for patient to be seen for medication management.   contacted patient and patient states he would rather be seen in person.  I contacted the office of referring provider Dr. Hoffman and made them aware.

## (undated) DEVICE — SINGLE-USE BIOPSY FORCEPS: Brand: RADIAL JAW 4

## (undated) DEVICE — CANN SMPL SOFTECH BIFLO ETCO2 A/M 7FT